# Patient Record
Sex: FEMALE | Race: WHITE | NOT HISPANIC OR LATINO | Employment: PART TIME | URBAN - METROPOLITAN AREA
[De-identification: names, ages, dates, MRNs, and addresses within clinical notes are randomized per-mention and may not be internally consistent; named-entity substitution may affect disease eponyms.]

---

## 2016-09-19 LAB — PAP-ABSTRACT: NORMAL

## 2017-01-04 ENCOUNTER — HOSPITAL ENCOUNTER (EMERGENCY)
Facility: CLINIC | Age: 27
Discharge: HOME | End: 2017-01-04
Attending: EMERGENCY MEDICINE | Admitting: EMERGENCY MEDICINE
Payer: COMMERCIAL

## 2017-01-04 VITALS
BODY MASS INDEX: 21.5 KG/M2 | WEIGHT: 125.31 LBS | SYSTOLIC BLOOD PRESSURE: 132 MMHG | DIASTOLIC BLOOD PRESSURE: 88 MMHG | RESPIRATION RATE: 16 BRPM | OXYGEN SATURATION: 100 % | TEMPERATURE: 97.9 F

## 2017-01-04 DIAGNOSIS — K08.89 PAIN, DENTAL: ICD-10-CM

## 2017-01-04 PROCEDURE — 99283 EMERGENCY DEPT VISIT LOW MDM: CPT

## 2017-01-04 PROCEDURE — 99283 EMERGENCY DEPT VISIT LOW MDM: CPT | Performed by: EMERGENCY MEDICINE

## 2017-01-04 RX ORDER — CLINDAMYCIN HCL 300 MG
300 CAPSULE ORAL 4 TIMES DAILY
Qty: 40 CAPSULE | Refills: 0 | Status: SHIPPED | OUTPATIENT
Start: 2017-01-04 | End: 2017-01-14

## 2017-01-04 ASSESSMENT — ENCOUNTER SYMPTOMS
FEVER: 0
HEADACHES: 0
LIGHT-HEADEDNESS: 0
ACTIVITY CHANGE: 0
SHORTNESS OF BREATH: 0
CHILLS: 0
WEAKNESS: 0
NECK PAIN: 0
TROUBLE SWALLOWING: 0
APPETITE CHANGE: 1
DIZZINESS: 0
FACIAL SWELLING: 0

## 2017-01-04 NOTE — ED NOTES
Pt ambulated to restroom, urine specimen provided CCL, held at bedside. MD at bedside for assessment.

## 2017-01-04 NOTE — ED PROVIDER NOTES
History     Chief Complaint   Patient presents with     Dental Pain     dental pain     HPI  Michelle Torres is a 26 year old female who presents with dental pain.  She is 4 weeks pregnant and states she has had dental pain worsening for the past 3 or 4 days.  Patient feels like her right upper wisdom tooth is coming in.  Pain is constant and her [place] become swollen.  She is concerned she might have an abscess.  She rates her pain a 7 out of 10 and worse with eating.  She denies any fevers has not any chills.  Denies any posterior pharynx pain.  She does not have a headache or neck pain.  She has been trying to get a hold of a dentist but is unable to at this time.    I have reviewed the Medications, Allergies, Past Medical and Surgical History, and Social History in the Epic system.    Review of Systems   Constitutional: Positive for appetite change. Negative for fever, chills and activity change.   HENT: Positive for dental problem. Negative for congestion, facial swelling and trouble swallowing.    Respiratory: Negative for shortness of breath.    Cardiovascular: Negative for chest pain.   Musculoskeletal: Negative for neck pain.   Skin: Negative for rash.   Neurological: Negative for dizziness, weakness, light-headedness and headaches.       Physical Exam   BP: 132/88 mmHg  Heart Rate: 101  Temp: 97.9  F (36.6  C)  Resp: 16  Weight: 56.841 kg (125 lb 5 oz)  SpO2: 100 %  Physical Exam   Constitutional: She appears well-developed and well-nourished. She appears distressed.   HENT:   Head: Normocephalic.   Mouth/Throat: Oropharynx is clear and moist.   R upper posterior molar region with mild to moderate gingival swelling and a  Eruption of a possible wisdom tooth posteriorly. Area is tender to palpation. Posterior pharynx was without erythema or edema. Present.    Eyes: Conjunctivae are normal.   Neck: Normal range of motion. Neck supple.   Mild r sided anterior cervical addenopathy.    Pulmonary/Chest: Effort  normal.   Musculoskeletal: Normal range of motion.   Neurological: She is alert. She exhibits normal muscle tone.   Skin: Skin is warm and dry. No rash noted.   Psychiatric: She has a normal mood and affect.   Nursing note and vitals reviewed.      ED Course   Procedures             Critical Care time:  none          Assessments & Plan (with Medical Decision Making) patient states she has found a dentist to go to a nail see her immediately.  I discussed possible dental block with patient but due to her having the dentist appointment she just wanted to go.  She does not want pain medication at this time.  She appears to have an incoming wisdom tooth there appears to be some gingival swelling and I did write a prescription for clindamycin by mouth.  She is to follow up with a dentist immediately after being discharged and return if symptoms worsen or new symptoms develop.       I have reviewed the nursing notes.    I have reviewed the findings, diagnosis, plan and need for follow up with the patient.    New Prescriptions    CLINDAMYCIN (CLEOCIN) 300 MG CAPSULE    Take 1 capsule (300 mg) by mouth 4 times daily for 10 days       Final diagnoses:   Pain, dental       1/4/2017   [facility] EMERGENCY DEPARTMENT      Aurelio Conway MD  01/04/17 1903

## 2017-01-04 NOTE — ED AVS SNAPSHOT
[facility]    [address] [address]    Phone:  [phone]    Fax:  [phone]                                       Michelle Torres   MRN: [id number]    Department:  [facility]   Date of Visit:  1/4/2017           Patient Information     Date Of Birth          1990        Your diagnoses for this visit were:     Pain, dental possible wisdom tooth eruption with gingivitis       You were seen by Aurelio Conway MD.      Follow-up Information     Follow up with Rochelle Carlisle DO.    Specialties:  Family Practice, Urgent Care    Why:  As needed    Contact information:    [address]   [facility] [address]  [phone]          Follow up with [facility].    Specialty:  EMERGENCY MEDICINE    Why:  If symptoms worsen    Contact information:    [address] [facility] [id number] [phone]    Additional information:    The medical center is located at   [address] ([address] in [place], [place] miles north   of [place]).        Discharge Instructions       Return if symptoms worsen or new symptoms develop.  Follow-up with dentist immediately after being discharged.  Take Tylenol for pain.  Take clindamycin as directed.  If worsening pain fevers chills or other symptoms present please return for further evaluation and care.    Discharge References/Attachments     DENTAL PAIN (ENGLISH)      24 Hour Appointment Hotline       To make an appointment at any [place] clinic, call [phone] ([phone]). If you don't have a family doctor or clinic, we will help you find one. [facility] clinics are conveniently located to serve the needs of you and your family.             Review of your medicines      START taking        Dose / Directions Last dose taken    clindamycin 300 MG capsule   Commonly known as:  CLEOCIN   Dose:  300 mg   Quantity:   40 capsule        Take 1 capsule (300 mg) by mouth 4 times daily for 10 days   Refills:  0          Our records show that you are taking the medicines listed below. If these are incorrect, please call your family doctor or clinic.        Dose / Directions Last dose taken    fluconazole 150 MG tablet   Commonly known as:  DIFLUCAN   Dose:  150 mg   Quantity:  2 tablet        Take 1 tablet (150 mg) by mouth once , May repeat in 5-7  Days for unresolved symptoms.   Refills:  0                Prescriptions were sent or printed at these locations (1 Prescription)                   Other Prescriptions                Printed at Department/Unit printer (1 of 1)         clindamycin (CLEOCIN) 300 MG capsule                Orders Needing Specimen Collection     None      Pending Results     No orders found from 1/3/2017 to 1/5/2017.            Pending Culture Results     No orders found from 1/3/2017 to 1/5/2017.             Test Results from your hospital stay            Thank you for choosing [place]       Thank you for choosing [facility] for your care. Our goal is always to provide you with excellent care. Hearing back from our patients is one way we can continue to improve our services. Please take a few minutes to complete the written survey that you may receive in the mail after you visit with us. Thank you!        [facility] Information     [employer] gives you secure access to your electronic health record. If you see a primary care provider, you can also send messages to your care team and make appointments. If you have questions, please call your primary care clinic.  If you do not have a primary care provider, please call [phone] and they will assist you.        Care EveryWhere ID     This is your Care EveryWhere ID. This could be used by other organizations to access your [place] medical records  [id number]        After Visit Summary       This is your record. Keep this with you and show to your community  pharmacist(s) and doctor(s) at your next visit.

## 2017-01-04 NOTE — ED AVS SNAPSHOT
[facility]    [address] [address]    Phone:  [phone]    Fax:  [phone]                                       Michelle Torres   MRN: [id number]    Department:  [facility]   Date of Visit:  1/4/2017           After Visit Summary Signature Page     I have received my discharge instructions, and my questions have been answered. I have discussed any challenges I see with this plan with the nurse or doctor.    ..........................................................................................................................................  Patient/Patient Representative Signature      ..........................................................................................................................................  Patient Representative Print Name and Relationship to Patient    ..................................................               ................................................  Date                                            Time    ..........................................................................................................................................  Reviewed by Signature/Title    ...................................................              ..............................................  Date                                                            Time

## 2017-01-04 NOTE — DISCHARGE INSTRUCTIONS
Return if symptoms worsen or new symptoms develop.  Follow-up with dentist immediately after being discharged.  Take Tylenol for pain.  Take clindamycin as directed.  If worsening pain fevers chills or other symptoms present please return for further evaluation and care.

## 2017-01-13 LAB
ABO + RH BLD: NORMAL
ABO + RH BLD: NORMAL
BLD GP AB SCN SERPL QL: NEGATIVE
HBV SURFACE AG SERPL QL IA: NON REACTIVE
RUBELLA ANTIBODY IGG QUANTITATIVE: 0.93 IU/ML
T PALLIDUM IGG SER QL: NEGATIVE

## 2017-01-25 LAB
HBA1C MFR BLD: 5.5 % (ref 0–5.7)
HIV 1+2 AB+HIV1 P24 AG SERPL QL IA: NON REACTIVE
VZV IGG SER QL IA: 370.9 INDEX

## 2017-03-29 LAB
ALT SERPL-CCNC: 7 IU/L (ref 8–45)
AST SERPL-CCNC: 10 IU/L (ref 2–40)

## 2017-04-19 ENCOUNTER — TRANSFERRED RECORDS (OUTPATIENT)
Dept: HEALTH INFORMATION MANAGEMENT | Facility: CLINIC | Age: 27
End: 2017-04-19

## 2017-04-19 LAB
C TRACH DNA SPEC QL PROBE+SIG AMP: NEGATIVE
CREAT SERPL-MCNC: 0.57 MG/DL (ref 0.57–1.11)
ERYTHROCYTE [DISTWIDTH] IN BLOOD BY AUTOMATED COUNT: 14.9 %
GFR SERPL CREATININE-BSD FRML MDRD: >60 ML/MIN/1.73M2
GLUCOSE SERPL-MCNC: 90 MG/DL (ref 65–100)
HCT VFR BLD AUTO: 30.8 %
HEMOGLOBIN: 10.2 G/DL (ref 11.7–15.7)
MCH RBC QN AUTO: 26.8 PG
MCHC RBC AUTO-ENTMCNC: 33.1 G/DL
MCV RBC AUTO: 81 FL
N GONORRHOEA DNA SPEC QL PROBE+SIG AMP: NEGATIVE
PLATELET # BLD AUTO: 406 10^9/L
POTASSIUM SERPL-SCNC: 3.5 MMOL/L (ref 3.5–5)
RBC # BLD AUTO: 3.81 10^12/L
SPECIMEN DESCRIP: NORMAL
SPECIMEN DESCRIPTION: NORMAL
WBC # BLD AUTO: 11.2 10^9/L

## 2017-05-31 ENCOUNTER — TRANSFERRED RECORDS (OUTPATIENT)
Dept: HEALTH INFORMATION MANAGEMENT | Facility: CLINIC | Age: 27
End: 2017-05-31

## 2017-06-12 ENCOUNTER — PRENATAL OFFICE VISIT (OUTPATIENT)
Dept: FAMILY MEDICINE | Facility: CLINIC | Age: 27
End: 2017-06-12
Payer: COMMERCIAL

## 2017-06-12 VITALS
DIASTOLIC BLOOD PRESSURE: 56 MMHG | HEART RATE: 96 BPM | WEIGHT: 125.6 LBS | SYSTOLIC BLOOD PRESSURE: 122 MMHG | BODY MASS INDEX: 21.44 KG/M2 | HEIGHT: 64 IN | TEMPERATURE: 97.3 F

## 2017-06-12 DIAGNOSIS — Z34.93 PRENATAL CARE IN THIRD TRIMESTER: Primary | ICD-10-CM

## 2017-06-12 DIAGNOSIS — M54.50 ACUTE BILATERAL LOW BACK PAIN WITHOUT SCIATICA: ICD-10-CM

## 2017-06-12 DIAGNOSIS — F32.4 MAJOR DEPRESSIVE DISORDER WITH SINGLE EPISODE, IN PARTIAL REMISSION (H): ICD-10-CM

## 2017-06-12 PROCEDURE — 99207 ZZC PRENATAL VISIT: CPT | Performed by: FAMILY MEDICINE

## 2017-06-12 RX ORDER — SERTRALINE HYDROCHLORIDE 100 MG/1
100 TABLET, FILM COATED ORAL DAILY
Status: ON HOLD | COMMUNITY
Start: 2017-05-26 | End: 2017-07-27

## 2017-06-12 RX ORDER — PRENATAL VIT/IRON FUM/FOLIC AC 27MG-0.8MG
1 TABLET ORAL DAILY
COMMUNITY
Start: 2017-01-04 | End: 2018-04-09

## 2017-06-12 NOTE — PROGRESS NOTES
26w6d    Pt presents today as a transfer of care.  Recently moved and now planning on delivering at [facility].  Previous care through [facility].  Records available in CareEverywhere.  Pt reports h/o frequent UTI with her first pregnancy and she did have a pyelonephritis at that time as well.  No issues so far with this pregnancy.    Fetal movement noted.  Feels like she is having a lot of pain in the low back and lower abdomen, has a sharp pain.  This has bene going on most of her pregnancy and is not really changed.  Has rare [place] contractions, maybe twice per week.  No change in vaginal discharge or vaginal bleeding noted.     Taking 2 [place] vitamins daily, does not tolerate prenatals.    Continuing to smoke.  Used to be 2ppd, down to 1/2 ppd.  Denies EtOH or drug use.  Declines 1 hour GTT today, would prefer to return.  Anomaly screen on 5/31/17, results reviewed in Care Everywhere and normal.      Mood has been okay on the sertraline.  Had a recent dose increase about 3 weeks ago.  Has had a lot of stressors, willing to visit with a counselor.    Plan:    Patient Instructions   Please schedule your gestational diabetes screen as soon as possible.  You will need to be fasting for 2 hours prior to your visit and will need to plan to be in clinic for 1 hour.  This can be scheduled as a lab only appointment.    Please schedule a visit with OB in 2 weeks for your next routine prenatal visit.    I would recommend a visit with physical therapy for the ongoing low back pain.    I also placed a new referral for counseling.  You should be getting a call in 1-2 days to help you schedule.    We should plan to visit again here in about 4 weeks.    Reviewed with pt that I no longer do deliveries but co-manage with OB.  Reviewed that she would need to see them now and plan transfer of care at 36 weeks.  She verbalized understanding.

## 2017-06-12 NOTE — PATIENT INSTRUCTIONS
Please schedule your gestational diabetes screen as soon as possible.  You will need to be fasting for 2 hours prior to your visit and will need to plan to be in clinic for 1 hour.  This can be scheduled as a lab only appointment.    Please schedule a visit with OB in 2 weeks for your next routine prenatal visit.    I would recommend a visit with physical therapy for the ongoing low back pain.    I also placed a new referral for counseling.  You should be getting a call in 1-2 days to help you schedule.    We should plan to visit again here in about 4 weeks.

## 2017-06-12 NOTE — MR AVS SNAPSHOT
After Visit Summary   6/12/2017    Michelle Torres    MRN: [id number]           Patient Information     Date Of Birth          1990        Visit Information        Provider Department      6/12/2017 9:20 AM Rochelle Carlisle DO [facility]        Today's Diagnoses     Prenatal care in third trimester    -  1    Major depressive disorder with single episode, in partial remission (H)        Acute bilateral low back pain without sciatica          Care Instructions    Please schedule your gestational diabetes screen as soon as possible.  You will need to be fasting for 2 hours prior to your visit and will need to plan to be in clinic for 1 hour.  This can be scheduled as a lab only appointment.    Please schedule a visit with OB in 2 weeks for your next routine prenatal visit.    I would recommend a visit with physical therapy for the ongoing low back pain.    I also placed a new referral for counseling.  You should be getting a call in 1-2 days to help you schedule.    We should plan to visit again here in about 4 weeks.          Follow-ups after your visit        Additional Services     [facility] PT, HAND, AND CHIROPRACTIC REFERRAL       **This order will print in the [facility] Scheduling Office**    Physical Therapy, Hand Therapy and Chiropractic Care are available through:    *[facility]  *[facility]  *[facility]    Call one number to schedule at any of the above locations: [phone].    Your provider has referred you to: Physical Therapy at [facility] or [facility]    Indication/Reason for Referral: Low Back Pain  Onset of Illness: months, related to pregnancy  Therapy Orders: Evaluate and Treat  Special Programs: None  Special Request: None    Halima Varghese      Additional Comments for the Therapist or Chiropractor:     Please be aware that coverage of these services is subject to the terms and limitations of your health insurance plan.  Call  member services at your health plan with any benefit or coverage questions.      Please bring the following to your appointment:    *Your personal calendar for scheduling future appointments  *Comfortable clothing            MENTAL HEALTH REFERRAL       Your provider has referred you to: [place]: [facility] - Counseling (Individual/Couples/Family) - [facility] [phone]   [email or web address]   *Patient will be contacted by [facility] scheduling partner, [facility]), to schedule an appointment.  Patients may also call [facility] to schedule.    All scheduling is subject to the client's specific insurance plan & benefits, provider/location availability, and provider clinical specialities.  Please arrive 15 minutes early for your first appointment and bring your completed paperwork.    Please be aware that coverage of these services is subject to the terms and limitations of your health insurance plan.  Call member services at your health plan with any benefit or coverage questions.            OB/GYN REFERRAL       Your provider has referred you to:  [place]: [facility] [phone]   [email or web address]    Please be aware that coverage of these services is subject to the terms and limitations of your health insurance plan.  Call member services at your health plan with any benefit or coverage questions.      Please bring the following with you to your appointment:    (1) Any X-Rays, CTs or MRIs which have been performed.  Contact the facility where they were done to arrange for  prior to your scheduled appointment.   (2) List of current medications   (3) This referral request   (4) Any documents/labs given to you for this referral                  Future tests that were ordered for you today     Open Future Orders        Priority Expected Expires Ordered     "Glucose tolerance gest screen 1 hour Routine 6/13/2017 7/12/2017 6/12/2017    Hemoglobin Routine 6/13/2017 7/12/2017 6/12/2017    Anti Treponema Routine 6/13/2017 7/12/2017 6/12/2017            Who to contact     Normal or non-critical lab and imaging results will be communicated to you by [employer], letter or phone within 4 business days after the clinic has received the results. If you do not hear from us within 7 days, please contact the clinic through [employer] or phone. If you have a critical or abnormal lab result, we will notify you by phone as soon as possible.  Submit refill requests through [employer] or call your pharmacy and they will forward the refill request to us. Please allow 3 business days for your refill to be completed.          If you need to speak with a  for additional information , please call: [phone]           Additional Information About Your Visit        [employer] Information     [employer] gives you secure access to your electronic health record. If you see a primary care provider, you can also send messages to your care team and make appointments. If you have questions, please call your primary care clinic.  If you do not have a primary care provider, please call [phone] and they will assist you.        Care EveryWhere ID     This is your Care EveryWhere ID. This could be used by other organizations to access your [facility] medical records  [id number]        Your Vitals Were     Pulse Temperature Height Last Period Breastfeeding? BMI (Body Mass Index)    96 97.3  F (36.3  C) (Tympanic) 5' 3.5\" (1.613 m) 09/03/2016 (LMP Unknown) No 21.9 kg/m2       Blood Pressure from Last 3 Encounters:   06/12/17 122/56   01/04/17 132/88   09/22/16 110/76    Weight from Last 3 Encounters:   06/12/17 125 lb 9.6 oz (57 kg)   01/04/17 125 lb 5 oz (56.8 kg)   09/22/16 124 lb 12.8 oz (56.6 kg)              We Performed the Following     [facility] PT, HAND, AND CHIROPRACTIC REFERRAL     MENTAL " HEALTH REFERRAL     OB/GYN REFERRAL          Today's Medication Changes          These changes are accurate as of: 6/12/17 10:24 AM.  If you have any questions, ask your nurse or doctor.               These medicines have changed or have updated prescriptions.        Dose/Directions    prenatal multivitamin  plus iron 27-0.8 MG Tabs per tablet   This may have changed:  Another medication with the same name was removed. Continue taking this medication, and follow the directions you see here.   Changed by:  Rochelle Carlisle DO        Dose:  1 tablet   Take 1 tablet by mouth daily   Refills:  0                Primary Care Provider Office Phone # Fax #    Rochelle Carlisle [phone]       [address]   [place] [facility] [address]        Thank you!     Thank you for choosing [facility]  for your care. Our goal is always to provide you with excellent care. Hearing back from our patients is one way we can continue to improve our services. Please take a few minutes to complete the written survey that you may receive in the mail after your visit with us. Thank you!             Your Updated Medication List - Protect others around you: Learn how to safely use, store and throw away your medicines at www.disposemymeds.org.          This list is accurate as of: 6/12/17 10:24 AM.  Always use your most recent med list.                   Brand Name Dispense Instructions for use    prenatal multivitamin  plus iron 27-0.8 MG Tabs per tablet      Take 1 tablet by mouth daily       sertraline 100 MG tablet    ZOLOFT     Take 100 mg by mouth daily

## 2017-06-13 ASSESSMENT — PATIENT HEALTH QUESTIONNAIRE - PHQ9: SUM OF ALL RESPONSES TO PHQ QUESTIONS 1-9: 7

## 2017-06-20 PROBLEM — M54.50 ACUTE BILATERAL LOW BACK PAIN WITHOUT SCIATICA: Status: ACTIVE | Noted: 2017-06-20

## 2017-06-20 PROBLEM — F32.4 MAJOR DEPRESSIVE DISORDER WITH SINGLE EPISODE, IN PARTIAL REMISSION (H): Status: ACTIVE | Noted: 2017-06-20

## 2017-06-20 PROBLEM — Z34.93 PRENATAL CARE IN THIRD TRIMESTER: Status: ACTIVE | Noted: 2017-06-20

## 2017-07-06 ENCOUNTER — HOSPITAL ENCOUNTER (OUTPATIENT)
Facility: CLINIC | Age: 27
Discharge: HOME | End: 2017-07-06
Attending: OBSTETRICS & GYNECOLOGY | Admitting: OBSTETRICS & GYNECOLOGY
Payer: COMMERCIAL

## 2017-07-06 ENCOUNTER — TELEPHONE (OUTPATIENT)
Dept: FAMILY MEDICINE | Facility: CLINIC | Age: 27
End: 2017-07-06

## 2017-07-06 PROBLEM — Z34.80 PRENATAL CARE, SUBSEQUENT PREGNANCY: Status: ACTIVE | Noted: 2017-07-06

## 2017-07-06 LAB
A1 MICROGLOB PLACENTAL VAG QL: NEGATIVE
ALBUMIN UR-MCNC: NEGATIVE MG/DL
AMORPH CRY #/AREA URNS HPF: ABNORMAL /HPF
APPEARANCE UR: ABNORMAL
BILIRUB UR QL STRIP: NEGATIVE
COLOR UR AUTO: YELLOW
GLUCOSE UR STRIP-MCNC: NEGATIVE MG/DL
HGB UR QL STRIP: NEGATIVE
KETONES UR STRIP-MCNC: NEGATIVE MG/DL
LEUKOCYTE ESTERASE UR QL STRIP: ABNORMAL
MUCOUS THREADS #/AREA URNS LPF: PRESENT /LPF
NITRATE UR QL: NEGATIVE
PH UR STRIP: 7 PH (ref 5–7)
RBC #/AREA URNS AUTO: 2 /HPF (ref 0–2)
SP GR UR STRIP: 1.01 (ref 1–1.03)
SQUAMOUS #/AREA URNS AUTO: 16 /HPF (ref 0–1)
URN SPEC COLLECT METH UR: ABNORMAL
UROBILINOGEN UR STRIP-MCNC: NORMAL MG/DL (ref 0–2)
WBC #/AREA URNS AUTO: 17 /HPF (ref 0–2)

## 2017-07-06 PROCEDURE — 99214 OFFICE O/P EST MOD 30 MIN: CPT

## 2017-07-06 PROCEDURE — 87086 URINE CULTURE/COLONY COUNT: CPT | Performed by: OBSTETRICS & GYNECOLOGY

## 2017-07-06 PROCEDURE — 84112 EVAL AMNIOTIC FLUID PROTEIN: CPT | Performed by: OBSTETRICS & GYNECOLOGY

## 2017-07-06 PROCEDURE — 81001 URINALYSIS AUTO W/SCOPE: CPT | Performed by: OBSTETRICS & GYNECOLOGY

## 2017-07-06 RX ORDER — ONDANSETRON 2 MG/ML
4 INJECTION INTRAMUSCULAR; INTRAVENOUS EVERY 6 HOURS PRN
Status: DISCONTINUED | OUTPATIENT
Start: 2017-07-06 | End: 2017-07-06 | Stop reason: HOSPADM

## 2017-07-06 NOTE — TELEPHONE ENCOUNTER
4:40PM  Pt called for appt, was warm passed to me, she states she is 30 weeks pg with second child  Has 3 yo at home, last night she noted a clear mucousy  Leakage,wetting panties , no odor,which has continue into today ,denies temp,  denies UTI sx ,  Feels  exhausted ,has HA, increased  lower  back pain  and note decreased FM today    Pt states she has had more erwin gordon contx  Past few days .  States she feels she is well hydrated,   Advised this late in day needs to go up to birthing center at [facility] to be seen and evaluated , states her boyfriend could come and take her   Chart to Dr Orestes Guzman  Clinic  RN/Mp Ortiz

## 2017-07-07 VITALS
HEART RATE: 97 BPM | DIASTOLIC BLOOD PRESSURE: 65 MMHG | TEMPERATURE: 97.9 F | SYSTOLIC BLOOD PRESSURE: 114 MMHG | RESPIRATION RATE: 18 BRPM

## 2017-07-08 LAB
BACTERIA SPEC CULT: ABNORMAL
MICRO REPORT STATUS: ABNORMAL
SPECIMEN SOURCE: ABNORMAL

## 2017-07-17 ENCOUNTER — TELEPHONE (OUTPATIENT)
Dept: FAMILY MEDICINE | Facility: CLINIC | Age: 27
End: 2017-07-17

## 2017-07-17 DIAGNOSIS — B37.31 CANDIDIASIS OF VAGINA: Primary | ICD-10-CM

## 2017-07-17 RX ORDER — FLUCONAZOLE 150 MG/1
150 TABLET ORAL ONCE
Qty: 1 TABLET | Refills: 0 | Status: SHIPPED | OUTPATIENT
Start: 2017-07-17 | End: 2017-07-17

## 2017-07-17 NOTE — TELEPHONE ENCOUNTER
Reason for call:  Patient reporting a symptom    Symptom or request: just finished Macrobid for a UTI, now has a yeast infection, would like to get some Diflucan.    Duration (how long have symptoms been present): 2 days    Have you been treated for this before? Yes    Additional comments: patient uses the [facility]    Phone Number patient can be reached at:  Home number on file [phone] (home)    Best Time:  any    Can we leave a detailed message on this number:  YES   Angelia Stewart  [facility] [place] [facility]      Call taken on 7/17/2017 at 9:58 AM by Angelia Stewart

## 2017-07-17 NOTE — TELEPHONE ENCOUNTER
Patient is still taking macrobid for a UTI. Symptoms of a yeast infection started 2 days ago. She has a discharge with an odor, itching and burning. She has had diflucan before for yeast infections. Patient is also 32 weeks pregnant. Please advise in Dr. Carlisle's absence. Thank you.  Luna Jordan RN

## 2017-07-17 NOTE — TELEPHONE ENCOUNTER
Prescription  [facility] to [facility] for diflucan 150 mg orally x 1.     Please call or email with any additional questions or concerns  RUPINDER Pagan CNP

## 2017-07-17 NOTE — TELEPHONE ENCOUNTER
Pt called back and would like Luna to call her.  She updated her phone number to [phone].  Pt has had many yeast infections and is hoping she doesn't have to be seen as she is 32 wks pregnant.    Thank you,  Karissa Jiménez, Station

## 2017-07-27 ENCOUNTER — HOSPITAL ENCOUNTER (OUTPATIENT)
Facility: CLINIC | Age: 27
Discharge: HOME | End: 2017-07-27
Attending: OBSTETRICS & GYNECOLOGY | Admitting: OBSTETRICS & GYNECOLOGY
Payer: COMMERCIAL

## 2017-07-27 VITALS — TEMPERATURE: 98.7 F | SYSTOLIC BLOOD PRESSURE: 121 MMHG | DIASTOLIC BLOOD PRESSURE: 71 MMHG

## 2017-07-27 DIAGNOSIS — Z34.93 PRENATAL CARE IN THIRD TRIMESTER: Primary | ICD-10-CM

## 2017-07-27 LAB
ALBUMIN UR-MCNC: 10 MG/DL
APPEARANCE UR: ABNORMAL
BILIRUB UR QL STRIP: NEGATIVE
COLOR UR AUTO: YELLOW
GLUCOSE UR STRIP-MCNC: NEGATIVE MG/DL
HGB UR QL STRIP: NEGATIVE
KETONES UR STRIP-MCNC: NEGATIVE MG/DL
LEUKOCYTE ESTERASE UR QL STRIP: ABNORMAL
MUCOUS THREADS #/AREA URNS LPF: PRESENT /LPF
NITRATE UR QL: NEGATIVE
PH UR STRIP: 7.5 PH (ref 5–7)
RBC #/AREA URNS AUTO: 0 /HPF (ref 0–2)
SP GR UR STRIP: 1.02 (ref 1–1.03)
SQUAMOUS #/AREA URNS AUTO: 13 /HPF (ref 0–1)
URN SPEC COLLECT METH UR: ABNORMAL
UROBILINOGEN UR STRIP-MCNC: NORMAL MG/DL (ref 0–2)
WBC #/AREA URNS AUTO: 21 /HPF (ref 0–2)

## 2017-07-27 PROCEDURE — 87086 URINE CULTURE/COLONY COUNT: CPT | Performed by: OBSTETRICS & GYNECOLOGY

## 2017-07-27 PROCEDURE — 59025 FETAL NON-STRESS TEST: CPT

## 2017-07-27 PROCEDURE — 81001 URINALYSIS AUTO W/SCOPE: CPT | Performed by: OBSTETRICS & GYNECOLOGY

## 2017-07-27 PROCEDURE — 59025 FETAL NON-STRESS TEST: CPT | Mod: 26 | Performed by: OBSTETRICS & GYNECOLOGY

## 2017-07-27 RX ORDER — CALCIUM CARBONATE 500 MG/1
1 TABLET, CHEWABLE ORAL 3 TIMES DAILY PRN
COMMUNITY
End: 2018-04-09

## 2017-07-27 RX ORDER — ONDANSETRON 2 MG/ML
4 INJECTION INTRAMUSCULAR; INTRAVENOUS EVERY 6 HOURS PRN
Status: DISCONTINUED | OUTPATIENT
Start: 2017-07-27 | End: 2017-07-27 | Stop reason: HOSPADM

## 2017-07-27 RX ORDER — NITROFURANTOIN 25; 75 MG/1; MG/1
100 CAPSULE ORAL 2 TIMES DAILY
Qty: 14 CAPSULE | Refills: 0 | Status: SHIPPED | OUTPATIENT
Start: 2017-07-27 | End: 2017-08-03

## 2017-07-27 RX ORDER — FLUCONAZOLE 150 MG/1
150 TABLET ORAL ONCE
Qty: 1 TABLET | Refills: 0 | Status: SHIPPED | OUTPATIENT
Start: 2017-07-27 | End: 2017-07-27

## 2017-07-27 NOTE — PROGRESS NOTES
Patient arrived for assessment due to  contractions that began last night.  Patient was eventually able to sleep .  She called [facility] about 1045 stating that this morning she was experiencing uncomfortable tightenings about 4-5/hour.  Denied leaking of fluid or vaginal bleeding.  Fetus active.  Patient was advised to hydrate and time tightenings for an hour and call back.  Patient advised to come to hospital as this was not helpful.  Approximately 1 month ago, patient was treated for a bladder infection and completed her course of antibiotics.  Denies symptoms now but urine was sent to lab.  Placed on fetal monitor.

## 2017-07-27 NOTE — IP AVS SNAPSHOT
MRN:[id number]                      After Visit Summary   7/27/2017    Michelle Torres    MRN: [id number]           Thank you!     Thank you for choosing [facility] for your care. Our goal is always to provide you with excellent care. Hearing back from our patients is one way we can continue to improve our services. Please take a few minutes to complete the written survey that you may receive in the mail after you visit with us. Thank you!        Patient Information     Date Of Birth          1990        About your hospital stay     You were admitted on:  July 27, 2017 You last received care in the:  [facility]    You were discharged on:  July 27, 2017       Who to Call     For medical emergencies, please call 911.  For non-urgent questions about your medical care, please call your primary care provider or clinic, [phone]          Attending Provider     Provider Specialty    Clare Murrell MD OB/Gyn       Primary Care Provider Office Phone # Fax #    Rochelle Garcia DO [place] [phone]      Further instructions from your care team       Make an appointment to establish care with an OB provider this week.  Take medications as prescribed.  Maintain adequate hydration.  Call [facility] if you have questions or concerns.    Pending Results     Date and Time Order Name Status Description    7/27/2017 1310 Urine Culture Aerobic Bacterial In process             Admission Information     Date & Time Provider Department Dept. Phone    7/27/2017 Clare Murrell MD [facility] [phone]      Your Vitals Were     Last Period                   09/03/2016 (LMP Unknown)           MyChart Information     [employer] gives you secure access to your electronic health record. If you see a primary care provider, you can also send messages to your care team and make appointments. If you have questions, please call your primary care clinic.  If you do  not have a primary care provider, please call [phone] and they will assist you.        Care EveryWhere ID     This is your Care EveryWhere ID. This could be used by other organizations to access your [place] medical records  [id number]        Equal Access to Services     YULIANA BUSBY : Hadii aad ku hadjose antoniomerrill Soben, waaxda luqadaha, qaybta kaalmada adeegyada, otis miladys pratikscotty phanchelita palmer lori schultz. So [facility] [phone].    ATENCIÓN: Si habla español, tiene a crowder disposición servicios gratuitos de asistencia lingüística. Llame al [phone].    We comply with applicable federal civil rights laws and [place] laws. We do not discriminate on the basis of race, color, national origin, age, disability sex, sexual orientation or gender identity.               Review of your medicines      UNREVIEWED medicines. Ask your doctor about these medicines        Dose / Directions    calcium carbonate 500 MG chewable tablet   Commonly known as:  TUMS        Dose:  1 chew tab   Take 1 chew tab by mouth 3 times daily as needed for heartburn   Refills:  0       prenatal multivitamin  plus iron 27-0.8 MG Tabs per tablet        Dose:  1 tablet   Take 1 tablet by mouth daily   Refills:  0         START taking        Dose / Directions    fluconazole 150 MG tablet   Commonly known as:  DIFLUCAN   Used for:  Prenatal care in third trimester        Dose:  150 mg   Take 1 tablet (150 mg) by mouth once for 1 dose   Quantity:  1 tablet   Refills:  0       nitroFURantoin (macrocrystal-monohydrate) 100 MG capsule   Commonly known as:  MACROBID   Used for:  Prenatal care in third trimester        Dose:  100 mg   Take 1 capsule (100 mg) by mouth 2 times daily   Quantity:  14 capsule   Refills:  0            Where to get your medicines      These medications were sent to [id number] [place] [id number] AT [place] [address] [place] [address]     Phone:  [phone]      fluconazole 150 MG tablet    nitroFURantoin (macrocrystal-monohydrate) 100 MG capsule                Protect others around you: Learn how to safely use, store and throw away your medicines at www.disposemymeds.org.             Medication List: This is a list of all your medications and when to take them. Check marks below indicate your daily home schedule. Keep this list as a reference.      Medications           Morning Afternoon Evening Bedtime As Needed    calcium carbonate 500 MG chewable tablet   Commonly known as:  TUMS   Take 1 chew tab by mouth 3 times daily as needed for heartburn                                fluconazole 150 MG tablet   Commonly known as:  DIFLUCAN   Take 1 tablet (150 mg) by mouth once for 1 dose                                nitroFURantoin (macrocrystal-monohydrate) 100 MG capsule   Commonly known as:  MACROBID   Take 1 capsule (100 mg) by mouth 2 times daily                                prenatal multivitamin  plus iron 27-0.8 MG Tabs per tablet   Take 1 tablet by mouth daily

## 2017-07-27 NOTE — DISCHARGE INSTRUCTIONS
Make an appointment to establish care with an OB provider this week.  Take medications as prescribed.  Maintain adequate hydration.  Call [facility] if you have questions or concerns.

## 2017-07-27 NOTE — PROGRESS NOTES
Patient was discharged.  The discomfort she was experiencing at was was less intense her.  It was mostly in left lateral abdomen and back.  Patient has felt only one contraction here which was noted on monitor.  Discharge medications were discussed.  Patient left ambulatory.

## 2017-07-27 NOTE — IP AVS SNAPSHOT
[facility]    [address] [address]    Phone:  [phone]    Fax:  [phone]                                       After Visit Summary   7/27/2017    Michelle Torres    MRN: [id number]           After Visit Summary Signature Page     I have received my discharge instructions, and my questions have been answered. I have discussed any challenges I see with this plan with the nurse or doctor.    ..........................................................................................................................................  Patient/Patient Representative Signature      ..........................................................................................................................................  Patient Representative Print Name and Relationship to Patient    ..................................................               ................................................  Date                                            Time    ..........................................................................................................................................  Reviewed by Signature/Title    ...................................................              ..............................................  Date                                                            Time

## 2017-07-27 NOTE — PROGRESS NOTES
Updated Dr. Murrell with patient's concerns and results of urinalysis.  Plan to culture urine and treat with Macrobid.  Due to absence of documented or palpated contractions, MD does not recommend cervical exam.

## 2017-07-29 LAB
BACTERIA SPEC CULT: ABNORMAL
MICRO REPORT STATUS: ABNORMAL
SPECIMEN SOURCE: ABNORMAL

## 2017-07-31 NOTE — PROGRESS NOTES
Inform patient that her urine culture does not show a bacteria that would cause a UTI. She can discontinue her antibiotic course since there is nothing that it is treating.     Clare Murrell MD  [facility]

## 2017-08-01 ENCOUNTER — TELEPHONE (OUTPATIENT)
Dept: OBGYN | Facility: CLINIC | Age: 27
End: 2017-08-01

## 2017-08-01 DIAGNOSIS — Z34.80 PRENATAL CARE, SUBSEQUENT PREGNANCY, UNSPECIFIED TRIMESTER: Primary | ICD-10-CM

## 2017-08-02 PROBLEM — D50.9 IRON DEFICIENCY ANEMIA: Status: ACTIVE | Noted: 2017-08-02

## 2017-08-03 ENCOUNTER — PRENATAL OFFICE VISIT (OUTPATIENT)
Dept: OBGYN | Facility: CLINIC | Age: 27
End: 2017-08-03
Payer: COMMERCIAL

## 2017-08-03 VITALS
HEIGHT: 64 IN | DIASTOLIC BLOOD PRESSURE: 80 MMHG | HEART RATE: 109 BPM | BODY MASS INDEX: 23.39 KG/M2 | WEIGHT: 137 LBS | SYSTOLIC BLOOD PRESSURE: 118 MMHG

## 2017-08-03 DIAGNOSIS — Z34.93 PRENATAL CARE IN THIRD TRIMESTER: Primary | ICD-10-CM

## 2017-08-03 DIAGNOSIS — M54.9 PREGNANCY RELATED BACK PAIN IN THIRD TRIMESTER, ANTEPARTUM: ICD-10-CM

## 2017-08-03 DIAGNOSIS — D50.9 IRON DEFICIENCY ANEMIA, UNSPECIFIED IRON DEFICIENCY ANEMIA TYPE: ICD-10-CM

## 2017-08-03 DIAGNOSIS — O26.893 PREGNANCY RELATED BACK PAIN IN THIRD TRIMESTER, ANTEPARTUM: ICD-10-CM

## 2017-08-03 PROCEDURE — 99207 ZZC PRENATAL VISIT: CPT | Performed by: OBSTETRICS & GYNECOLOGY

## 2017-08-03 RX ORDER — HYDROXYZINE HYDROCHLORIDE 25 MG/1
50-100 TABLET, FILM COATED ORAL EVERY 6 HOURS PRN
Qty: 60 TABLET | Refills: 1 | Status: ON HOLD | OUTPATIENT
Start: 2017-08-03 | End: 2017-08-27

## 2017-08-03 NOTE — NURSING NOTE
"Initial /80 (BP Location: Right arm, Patient Position: Chair, Cuff Size: Adult Regular)  Pulse 109  Ht 5' 4\" (1.626 m)  Wt 137 lb (62.1 kg)  LMP 09/03/2016 (LMP Unknown)  Breastfeeding? No  BMI 23.52 kg/m2 Estimated body mass index is 23.52 kg/(m^2) as calculated from the following:    Height as of this encounter: 5' 4\" (1.626 m).    Weight as of this encounter: 137 lb (62.1 kg). .    Deysi Calhoun    "

## 2017-08-03 NOTE — MR AVS SNAPSHOT
"              After Visit Summary   8/3/2017    Michelle Torres    MRN: [id number]           Patient Information     Date Of Birth          1990        Visit Information        Provider Department      8/3/2017 11:30 AM Clare Murrell MD [facility]        Today's Diagnoses     Prenatal care in third trimester    -  1    Pregnancy related back pain in third trimester, antepartum        Iron deficiency anemia, unspecified iron deficiency anemia type           Follow-ups after your visit        Who to contact     If you have questions or need follow up information about today's clinic visit or your schedule please contact [facility] directly at [phone].  Normal or non-critical lab and imaging results will be communicated to you by MyChart, letter or phone within 4 business days after the clinic has received the results. If you do not hear from us within 7 days, please contact the clinic through [employer] or phone. If you have a critical or abnormal lab result, we will notify you by phone as soon as possible.  Submit refill requests through [employer] or call your pharmacy and they will forward the refill request to us. Please allow 3 business days for your refill to be completed.          Additional Information About Your Visit        MyChart Information     [employer] gives you secure access to your electronic health record. If you see a primary care provider, you can also send messages to your care team and make appointments. If you have questions, please call your primary care clinic.  If you do not have a primary care provider, please call [phone] and they will assist you.        Care EveryWhere ID     This is your Care EveryWhere ID. This could be used by other organizations to access your [place] medical records  [id number]        Your Vitals Were     Pulse Height Last Period Breastfeeding? BMI (Body Mass Index)       109 5' 4\" (1.626 m) 09/03/2016 (LMP Unknown) No " 23.52 kg/m2        Blood Pressure from Last 3 Encounters:   08/03/17 118/80   07/27/17 121/71   07/06/17 114/65    Weight from Last 3 Encounters:   08/03/17 137 lb (62.1 kg)   06/12/17 125 lb 9.6 oz (57 kg)   01/04/17 125 lb 5 oz (56.8 kg)              Today, you had the following     No orders found for display         Today's Medication Changes          These changes are accurate as of: 8/3/17 12:50 PM.  If you have any questions, ask your nurse or doctor.               Start taking these medicines.        Dose/Directions    hydrOXYzine 25 MG tablet   Commonly known as:  ATARAX   Used for:  Pregnancy related back pain in third trimester, antepartum        Dose:   mg   Take 2-4 tablets ( mg) by mouth every 6 hours as needed for itching   Quantity:  60 tablet   Refills:  1            Where to get your medicines      These medications were sent to [facility] [address] [place] [address]  AT [address] , [facility] [address]     Phone:  [phone]     hydrOXYzine 25 MG tablet                Primary Care Provider Office Phone # Fax #    Rochelle Smithjabari Carl[place]  [phone]       [facility] [address]   [place] [facility] [address]        Equal Access to Services     YULIANA BUSBY AH: Hadii zohra ku hadasho Soomaali, waaxda luqadaha, qaybta kaalmada adeegyada, otis schultz. So [facility] [phone].    ATENCIÓN: Si habla español, tiene a crowder disposición servicios gratuitos de asistencia lingüística. Lon al [phone].    We comply with applicable federal civil rights laws and [place] laws. We do not discriminate on the basis of race, color, national origin, age, disability sex, sexual orientation or gender identity.            Thank you!     Thank you for choosing [facility]  for your care. Our goal is always to provide you with excellent care. Hearing back from our patients is one way we can  continue to improve our services. Please take a few minutes to complete the written survey that you may receive in the mail after your visit with us. Thank you!             Your Updated Medication List - Protect others around you: Learn how to safely use, store and throw away your medicines at www.disposemymeds.org.          This list is accurate as of: 8/3/17 12:50 PM.  Always use your most recent med list.                   Brand Name Dispense Instructions for use Diagnosis    calcium carbonate 500 MG chewable tablet    TUMS     Take 1 chew tab by mouth 3 times daily as needed for heartburn        hydrOXYzine 25 MG tablet    ATARAX    60 tablet    Take 2-4 tablets ( mg) by mouth every 6 hours as needed for itching    Pregnancy related back pain in third trimester, antepartum       prenatal multivitamin  plus iron 27-0.8 MG Tabs per tablet      Take 1 tablet by mouth daily

## 2017-08-03 NOTE — PROGRESS NOTES
"Transfer of Care     Ms. Michelle Torres 27 year old  34w2d presents for prenatal care.   Reports ongoing low back pain and occasional ctx. Would like something to cut the edge off of the back pain especially to get her to sleep.   Denies LOF, VB.   Reports fetal movement.   She was seen a week ago for lower pelvic cramping and was discharged with antibiotics for empiric treatment of UTI; however, urine culture returned negative and she was instructed to discontinue.   Pregnancy has thus far been uncomplicated.   She established prenatal care through [facility].    Exam:   /80 (BP Location: Right arm, Patient Position: Chair, Cuff Size: Adult Regular)  Pulse 109  Ht 5' 4\" (1.626 m)  Wt 137 lb (62.1 kg)  LMP 2016 (LMP Unknown)  Breastfeeding? No  BMI 23.52 kg/m2  General Appearance: NAD  Abdomen: Gravid, NT.   Pelvic: Deferred    Refer to flow sheet above.     Prenatal labs reviewed from Care Everywhere; Rh positive; rubella equivocal; infectious disease panel negative, urine culture negative. 1 hr GCT not seen. Hgb 10.2 on 2017.  Prenatal ultrasound reviewed from Care Everywhere; all appear normal.     A/P: 27 year old  at 34w2d  -- concerns addressed above, reassurance provided  -- vistaril prescribed for back discomfort and difficulty sleeping  -- PTL precautions reviewed  RTC in 1-2 weeks     Clare Murrell MD  [facility]                    "

## 2017-08-15 ENCOUNTER — TELEPHONE (OUTPATIENT)
Dept: OBGYN | Facility: CLINIC | Age: 27
End: 2017-08-15

## 2017-08-15 DIAGNOSIS — Z34.93 PRENATAL CARE IN THIRD TRIMESTER: Primary | ICD-10-CM

## 2017-08-15 NOTE — TELEPHONE ENCOUNTER
Reason for Call:  Please fax order for breast pump    Please fax to: [phone]    Detailed comments: see above    Phone Number Patient can be reached at: [phone]    Best Time: any    Can we leave a detailed message on this number? YES    Call taken on 8/15/2017 at 3:10 PM by Saray Doyle

## 2017-08-15 NOTE — TELEPHONE ENCOUNTER
Prescription generated.  Signed by Dr. Murrell  Given to  for garima.    Angy Aguilar   Ob/Gyn Clinic  RN

## 2017-08-18 ENCOUNTER — PRENATAL OFFICE VISIT (OUTPATIENT)
Dept: OBGYN | Facility: CLINIC | Age: 27
End: 2017-08-18
Payer: COMMERCIAL

## 2017-08-18 VITALS
SYSTOLIC BLOOD PRESSURE: 130 MMHG | WEIGHT: 139.6 LBS | DIASTOLIC BLOOD PRESSURE: 78 MMHG | BODY MASS INDEX: 23.83 KG/M2 | HEIGHT: 64 IN | HEART RATE: 106 BPM

## 2017-08-18 DIAGNOSIS — Z34.80 PRENATAL CARE, SUBSEQUENT PREGNANCY, UNSPECIFIED TRIMESTER: Primary | ICD-10-CM

## 2017-08-18 DIAGNOSIS — Z28.39 RUBELLA NON-IMMUNE STATUS, ANTEPARTUM: ICD-10-CM

## 2017-08-18 DIAGNOSIS — O09.899 RUBELLA NON-IMMUNE STATUS, ANTEPARTUM: ICD-10-CM

## 2017-08-18 DIAGNOSIS — D50.9 IRON DEFICIENCY ANEMIA, UNSPECIFIED IRON DEFICIENCY ANEMIA TYPE: ICD-10-CM

## 2017-08-18 PROCEDURE — 59425 ANTEPARTUM CARE ONLY: CPT | Performed by: OBSTETRICS & GYNECOLOGY

## 2017-08-18 PROCEDURE — 99207 ZZC PRENATAL VISIT: CPT | Performed by: OBSTETRICS & GYNECOLOGY

## 2017-08-18 PROCEDURE — 87653 STREP B DNA AMP PROBE: CPT | Performed by: OBSTETRICS & GYNECOLOGY

## 2017-08-18 NOTE — MR AVS SNAPSHOT
After Visit Summary   8/18/2017    Michelle Torres    MRN: [id number]           Patient Information     Date Of Birth          1990        Visit Information        Provider Department      8/18/2017 10:00 AM Hank Aranda,  [facility]        Today's Diagnoses     Prenatal care, subsequent pregnancy, unspecified trimester    -  1    Iron deficiency anemia, unspecified iron deficiency anemia type        Rubella non-immune status, antepartum           Follow-ups after your visit        Future tests that were ordered for you today     Open Future Orders        Priority Expected Expires Ordered    US OB Ltd One Or More Fetus FU/Repeat Routine  8/18/2018 8/18/2017            Who to contact     If you have questions or need follow up information about today's clinic visit or your schedule please contact [facility] directly at [phone].  Normal or non-critical lab and imaging results will be communicated to you by [employer], letter or phone within 4 business days after the clinic has received the results. If you do not hear from us within 7 days, please contact the clinic through [employer] or phone. If you have a critical or abnormal lab result, we will notify you by phone as soon as possible.  Submit refill requests through [employer] or call your pharmacy and they will forward the refill request to us. Please allow 3 business days for your refill to be completed.          Additional Information About Your Visit        MyChart Information     [employer] gives you secure access to your electronic health record. If you see a primary care provider, you can also send messages to your care team and make appointments. If you have questions, please call your primary care clinic.  If you do not have a primary care provider, please call [phone] and they will assist you.        Care EveryWhere ID     This is your Care EveryWhere ID. This could be used by other organizations to  "access your [place] medical records  [id number]        Your Vitals Were     Pulse Height Last Period Breastfeeding? BMI (Body Mass Index)       106 5' 3.5\" (1.613 m) 09/03/2016 (LMP Unknown) No 24.34 kg/m2        Blood Pressure from Last 3 Encounters:   08/18/17 130/78   08/03/17 118/80   07/27/17 121/71    Weight from Last 3 Encounters:   08/18/17 139 lb 9.6 oz (63.3 kg)   08/03/17 137 lb (62.1 kg)   06/12/17 125 lb 9.6 oz (57 kg)              We Performed the Following     Group B strep PCR        Primary Care Provider Office Phone # Fax #    Rochelle Garcia DO [place] [phone] [address] [place] [address]        Equal Access to Services     TERI [facility] : Hadii aad ku hadashmerrill Soben, waaxda luqadaha, qaybta kaalmada adetimoteo, otis sandoval . So [facility] [phone].    ATENCIÓN: Si habla español, tiene a crowder disposición servicios gratuitos de asistencia lingüística. Lon al [phone].    We comply with applicable federal civil rights laws and [place] laws. We do not discriminate on the basis of race, color, national origin, age, disability sex, sexual orientation or gender identity.            Thank you!     Thank you for choosing [facility]  for your care. Our goal is always to provide you with excellent care. Hearing back from our patients is one way we can continue to improve our services. Please take a few minutes to complete the written survey that you may receive in the mail after your visit with us. Thank you!             Your Updated Medication List - Protect others around you: Learn how to safely use, store and throw away your medicines at www.disposemymeds.org.          This list is accurate as of: 8/18/17 10:33 AM.  Always use your most recent med list.                   Brand Name Dispense Instructions for use Diagnosis    calcium carbonate 500 MG chewable tablet    TUMS     Take 1 chew tab by mouth 3 times daily as needed for " heartburn        hydrOXYzine 25 MG tablet    ATARAX    60 tablet    Take 2-4 tablets ( mg) by mouth every 6 hours as needed for itching    Pregnancy related back pain in third trimester, antepartum       order for DME     1 Device    Breast pump    Prenatal care in third trimester       prenatal multivitamin plus iron 27-0.8 MG Tabs per tablet      Take 1 tablet by mouth daily

## 2017-08-18 NOTE — PROGRESS NOTES
"Doing well. Denies VB, ctx, or LOF. +FM  Concerns: No   Have you experienced any visual changes, intractable HA, unrelenting RUQ/epigastric pain, progressive edema of the LE/hands/face, or seizures?: No   /78 (BP Location: Right arm, Patient Position: Chair, Cuff Size: Adult Regular)  Pulse 106  Ht 5' 3.5\" (1.613 m)  Wt 139 lb 9.6 oz (63.3 kg)  LMP 2016 (LMP Unknown)  Breastfeeding? No  BMI 24.34 kg/m2  General Appearance: NAD  Abdomen: Gravid, NT  Refer to flow sheet above.   A/P: 27 year old  at 36w3d    Discussed importance of completing her Glucola.  The pathophysiology of gestational diabetes and how it can affect the pregnancy as well as the  period was reviewed. She will complete this on Monday morning.  GBS taken today.   S<D --> check U/S (likely due to low presenting part)  Any pertinent labs and/or ultrasounds were reviewed. Daily FMC recommended.   Follow-up in 1 weeks     --    Hank Aranda, DO  [facility]    "

## 2017-08-18 NOTE — NURSING NOTE
"Chief Complaint   Patient presents with     Prenatal Care       Initial /78 (BP Location: Right arm, Patient Position: Chair, Cuff Size: Adult Regular)  Pulse 106  Ht 5' 3.5\" (1.613 m)  Wt 139 lb 9.6 oz (63.3 kg)  LMP 09/03/2016 (LMP Unknown)  Breastfeeding? No  BMI 24.34 kg/m2 Estimated body mass index is 24.34 kg/(m^2) as calculated from the following:    Height as of this encounter: 5' 3.5\" (1.613 m).    Weight as of this encounter: 139 lb 9.6 oz (63.3 kg).  Medication Reconciliation: complete   Karissa Garcia, HUGO      "

## 2017-08-19 LAB
GP B STREP DNA SPEC QL NAA+PROBE: NEGATIVE
SPECIMEN SOURCE: NORMAL

## 2017-08-21 ENCOUNTER — RADIANT APPOINTMENT (OUTPATIENT)
Dept: ULTRASOUND IMAGING | Facility: CLINIC | Age: 27
End: 2017-08-21
Attending: OBSTETRICS & GYNECOLOGY
Payer: COMMERCIAL

## 2017-08-21 DIAGNOSIS — Z34.80 PRENATAL CARE, SUBSEQUENT PREGNANCY, UNSPECIFIED TRIMESTER: ICD-10-CM

## 2017-08-21 PROCEDURE — 76816 OB US FOLLOW-UP PER FETUS: CPT

## 2017-08-22 DIAGNOSIS — Z34.93 PRENATAL CARE IN THIRD TRIMESTER: ICD-10-CM

## 2017-08-22 LAB
GLUCOSE 1H P 50 G GLC PO SERPL-MCNC: 161 MG/DL (ref 60–129)
HGB BLD-MCNC: 8.2 G/DL (ref 11.7–15.7)

## 2017-08-22 PROCEDURE — 36415 COLL VENOUS BLD VENIPUNCTURE: CPT | Performed by: FAMILY MEDICINE

## 2017-08-22 PROCEDURE — 85018 HEMOGLOBIN: CPT | Performed by: FAMILY MEDICINE

## 2017-08-22 PROCEDURE — 82950 GLUCOSE TEST: CPT | Performed by: FAMILY MEDICINE

## 2017-08-22 PROCEDURE — 86780 TREPONEMA PALLIDUM: CPT | Performed by: FAMILY MEDICINE

## 2017-08-23 LAB — T PALLIDUM IGG+IGM SER QL: NEGATIVE

## 2017-08-25 ENCOUNTER — HOSPITAL ENCOUNTER (OUTPATIENT)
Facility: CLINIC | Age: 27
Discharge: HOME | End: 2017-08-26
Attending: OBSTETRICS & GYNECOLOGY | Admitting: OBSTETRICS & GYNECOLOGY
Payer: COMMERCIAL

## 2017-08-25 DIAGNOSIS — Z34.93 PRENATAL CARE IN THIRD TRIMESTER: Primary | ICD-10-CM

## 2017-08-25 PROCEDURE — 81001 URINALYSIS AUTO W/SCOPE: CPT | Performed by: OBSTETRICS & GYNECOLOGY

## 2017-08-25 PROCEDURE — 87210 SMEAR WET MOUNT SALINE/INK: CPT | Performed by: OBSTETRICS & GYNECOLOGY

## 2017-08-25 PROCEDURE — 84112 EVAL AMNIOTIC FLUID PROTEIN: CPT | Performed by: OBSTETRICS & GYNECOLOGY

## 2017-08-25 NOTE — IP AVS SNAPSHOT
[facility]    [address] [address]    Phone:  [phone]    Fax:  [phone]                                       After Visit Summary   8/25/2017    Michelle Torres    MRN: [id number]           After Visit Summary Signature Page     I have received my discharge instructions, and my questions have been answered. I have discussed any challenges I see with this plan with the nurse or doctor.    ..........................................................................................................................................  Patient/Patient Representative Signature      ..........................................................................................................................................  Patient Representative Print Name and Relationship to Patient    ..................................................               ................................................  Date                                            Time    ..........................................................................................................................................  Reviewed by Signature/Title    ...................................................              ..............................................  Date                                                            Time

## 2017-08-25 NOTE — IP AVS SNAPSHOT
MRN:[id number]                      After Visit Summary   8/25/2017    Michelle Torres    MRN: [id number]           Thank you!     Thank you for choosing [facility] for your care. Our goal is always to provide you with excellent care. Hearing back from our patients is one way we can continue to improve our services. Please take a few minutes to complete the written survey that you may receive in the mail after you visit with us. Thank you!        Patient Information     Date Of Birth          1990        About your hospital stay     You were admitted on:  August 25, 2017 You last received care in the:  [facility]    You were discharged on:  August 26, 2017       Who to Call     For medical emergencies, please call 911.  For non-urgent questions about your medical care, please call your primary care provider or clinic, [phone]          Attending Provider     Provider Hank Guillory DO OB/Gyn       Primary Care Provider Office Phone # Fax #    Rochelle Garcia DO [place] [phone]      Your next 10 appointments already scheduled     Aug 28, 2017  9:45 AM CDT   [facility]ABL[facility]anda DO   [facility]    [address] [address] [phone]              Further instructions from your care team       Discharge Instructions for Undelivered Patients    Diet:    Drink 8 to 12 glasses of liquids (milk, juice, water) every day.    You may eat meals and snacks..    Activity:    Count fetal kicks every day. (See handout.)    Call your doctor if your baby is moving less than usual.    Medicines:    My care team has reviewed my medicines with me.    My care team has given me a list of my medicines.    My care team has prescribed a new medicine. They have either sent it home with me or ordered it from the pharmacy.     Macrobid 100 mg by mouth 2 times per day for 7 days.  Prescription sent to [facility] per request.     Call your provider if you notice:    Swelling in your face or increased swelling in your hands or legs.    Headaches that are not relieved by Tylenol (acetaminophen).    Changes in your vision (blurring; seeing spots or stars).    Nausea (sick to your stomach) and vomiting (throwing up).    Weight gain of 5 pounds per week.    Heartburn that doesn't go away.    Signs of bladder infection: pain when you urinate (use the toilet), needing to go more often or more urgently.    The bag of penny (membranes) breaks, or you notice leaking in your underwear.    Bright red blood in your underwear.    Abdominal (lower belly) or stomach pain.    For first baby: Contractions (tightenings) less than 5 minutes apart for one hour or more.    For Second (plus) baby: Contractions (tightenings) less than 10 minutes apart and getting stronger.    Increase or change in vaginal discharge (note the color and amount).    Other: Birthplace:[phone].    Follow up with your provider: Keep your appointment with Dr Aranda on Monday, 8/28/17. Contact [facility] at any time with questions and/or concerns.       Pending Results     No orders found for last 3 day(s).            Admission Information     Date & Time Provider Department Dept. Phone    8/25/2017 Hank Aranda, [facility] [phone]      Your Vitals Were     Last Period                   09/03/2016 (LMP Unknown)           MyChart Information     [employer] gives you secure access to your electronic health record. If you see a primary care provider, you can also send messages to your care team and make appointments. If you have questions, please call your primary care clinic.  If you do not have a primary care provider, please call [phone] and they will assist you.        Care EveryWhere ID     This is your Care EveryWhere ID. This could be used by other organizations to access your [place]  medical records  [id number]        Equal Access to Services     YULIANA BUSBY : Hadii zohra Kerr, viktor ambriz, king jane, otis schultz. So [facility] [phone].    ATENCIÓN: Si habla español, tiene a crowder disposición servicios gratuitos de asistencia lingüística. Llame al [phone].    We comply with applicable federal civil rights laws and [place] laws. We do not discriminate on the basis of race, color, national origin, age, disability sex, sexual orientation or gender identity.               Review of your medicines      UNREVIEWED medicines. Ask your doctor about these medicines        Dose / Directions    calcium carbonate 500 MG chewable tablet   Commonly known as:  TUMS        Dose:  1 chew tab   Take 1 chew tab by mouth 3 times daily as needed for heartburn   Refills:  0       FERROUS GLUCONATE PO   Indication:  Iron Deficiency        Dose:  324 mg   Take 324 mg by mouth daily (with breakfast)   Refills:  0       hydrOXYzine 25 MG tablet   Commonly known as:  ATARAX   Used for:  Pregnancy related back pain in third trimester, antepartum        Dose:   mg   Take 2-4 tablets ( mg) by mouth every 6 hours as needed for itching   Quantity:  60 tablet   Refills:  1       prenatal multivitamin plus iron 27-0.8 MG Tabs per tablet        Dose:  1 tablet   Take 1 tablet by mouth daily   Refills:  0         START taking        Dose / Directions    nitroFURantoin (macrocrystal-monohydrate) 100 MG capsule   Commonly known as:  MACROBID   Used for:  Prenatal care in third trimester        Dose:  100 mg   Take 1 capsule (100 mg) by mouth 2 times daily for 14 days   Quantity:  28 capsule   Refills:  0         CONTINUE these medicines which have NOT CHANGED        Dose / Directions    order for DME   Used for:  Prenatal care in third trimester        Breast pump   Quantity:  1 Device   Refills:  0            Where to get your medicines      These  medications were sent to [id number] - [place], [place] - [address] AT [place] [address], [place] [address]     Phone:  [phone]     nitroFURantoin (macrocrystal-monohydrate) 100 MG capsule                Protect others around you: Learn how to safely use, store and throw away your medicines at www.disposemymeds.org.             Medication List: This is a list of all your medications and when to take them. Check marks below indicate your daily home schedule. Keep this list as a reference.      Medications           Morning Afternoon Evening Bedtime As Needed    calcium carbonate 500 MG chewable tablet   Commonly known as:  TUMS   Take 1 chew tab by mouth 3 times daily as needed for heartburn                                FERROUS GLUCONATE PO   Take 324 mg by mouth daily (with breakfast)                                hydrOXYzine 25 MG tablet   Commonly known as:  ATARAX   Take 2-4 tablets ( mg) by mouth every 6 hours as needed for itching                                nitroFURantoin (macrocrystal-monohydrate) 100 MG capsule   Commonly known as:  MACROBID   Take 1 capsule (100 mg) by mouth 2 times daily for 14 days                                order for DME   Breast pump                                prenatal multivitamin plus iron 27-0.8 MG Tabs per tablet   Take 1 tablet by mouth daily                                          More Information        Kick Counts  It s normal to worry about your baby s health. One way you can know your baby s doing well is to record the baby s movements once a day. This is called a kick count. You will usually feel your baby move by the 20th week of pregnancy. Remember to take your kick count records to all your appointments with your healthcare provider.       How to Count Kicks    Choose a time when the baby is active, such as after a meal.     Sit comfortably or lie on your side.     The first time the baby  moves, write down the time.     Count each movement until the baby has moved 10 times. This can take from 20 minutes to 2 hours.     If the baby hasn t moved 4 times in 1 hour, pat your stomach to wake the baby up.    Write down the time you feel the baby s 10th movement.    Try to do it at the same time each day.  When to Call Your Healthcare Provider  Call your healthcare provider right away if you notice any of the following:    Your baby moves fewer than 10 times in 2 hours while you re doing kick counts.    Your baby moves much less often than on the days before.    You have not felt your baby move all day.    4683-4022 [facility], [address] [place] [address], [place] [address]. All rights reserved. This information is not intended as a substitute for professional medical care. Always follow your healthcare professional's instructions.

## 2017-08-26 VITALS
RESPIRATION RATE: 16 BRPM | TEMPERATURE: 98.6 F | SYSTOLIC BLOOD PRESSURE: 113 MMHG | DIASTOLIC BLOOD PRESSURE: 65 MMHG | HEART RATE: 100 BPM

## 2017-08-26 LAB
A1 MICROGLOB PLACENTAL VAG QL: NEGATIVE
ALBUMIN UR-MCNC: 10 MG/DL
AMORPH CRY #/AREA URNS HPF: ABNORMAL /HPF
APPEARANCE UR: ABNORMAL
BACTERIA #/AREA URNS HPF: ABNORMAL /HPF
BILIRUB UR QL STRIP: NEGATIVE
COLOR UR AUTO: YELLOW
GLUCOSE UR STRIP-MCNC: NEGATIVE MG/DL
HGB UR QL STRIP: NEGATIVE
KETONES UR STRIP-MCNC: NEGATIVE MG/DL
LEUKOCYTE ESTERASE UR QL STRIP: ABNORMAL
MUCOUS THREADS #/AREA URNS LPF: PRESENT /LPF
NITRATE UR QL: NEGATIVE
PH UR STRIP: 7.5 PH (ref 5–7)
RBC #/AREA URNS AUTO: 3 /HPF (ref 0–2)
SOURCE: ABNORMAL
SP GR UR STRIP: 1.02 (ref 1–1.03)
SPECIMEN SOURCE: NORMAL
SQUAMOUS #/AREA URNS AUTO: 3 /HPF (ref 0–1)
UROBILINOGEN UR STRIP-MCNC: NORMAL MG/DL (ref 0–2)
WBC #/AREA URNS AUTO: 22 /HPF (ref 0–2)
WET PREP SPEC: NORMAL

## 2017-08-26 PROCEDURE — 99214 OFFICE O/P EST MOD 30 MIN: CPT

## 2017-08-26 RX ORDER — NITROFURANTOIN 25; 75 MG/1; MG/1
100 CAPSULE ORAL 2 TIMES DAILY
Qty: 28 CAPSULE | Refills: 0 | Status: SHIPPED | OUTPATIENT
Start: 2017-08-26 | End: 2017-09-09

## 2017-08-26 NOTE — PROGRESS NOTES
P:1. 37+3 wks. Admits to TR2 per w/c c/o leaking small amounts of fluid several times since ~ 17. [facility] is not sure if it was mucous or fluid. [facility] has not felt fetal movement since ~ 17. C/O B/A. Denies regular contr or vaginal bleeding. Oriented to room et plan of care. External monitors applied. Reactive/Category 1 tracing obtained et verified w/J Meaghan VEGA. Cervix posterior et presenting part not applied. No fluid noted. Small amount of whitish vaginal discharge noted. Dr Aranda informed per phone of hx, s/s, UA, FHT's, VS, cervix, labs, etc. Orders received. Pt able to rest et awoken w/lab results et plan of care. Questions answered. [facility] feels comfortable d/c'ing home. Requests rx be sent to [facility]. E-scribe et phone message left. Pt [facility] has a few Macrobid at home from previous tx of UTI et will take one when arrives at home. [facility] has experienced numerous UTI's throughout pregnancy. Encourage pt to discuss w/MD at appt on Monday. Written et verbal instructions given w/verbalization of understanding. Agrees to call w/any questions and/or concerns. Discharge per ambulatory in stable condition to awaiting vehicle. Accompanied by support person.

## 2017-08-26 NOTE — DISCHARGE INSTRUCTIONS
Discharge Instructions for Undelivered Patients    Diet:    Drink 8 to 12 glasses of liquids (milk, juice, water) every day.    You may eat meals and snacks..    Activity:    Count fetal kicks every day. (See handout.)    Call your doctor if your baby is moving less than usual.    Medicines:    My care team has reviewed my medicines with me.    My care team has given me a list of my medicines.    My care team has prescribed a new medicine. They have either sent it home with me or ordered it from the pharmacy.     Macrobid 100 mg by mouth 2 times per day for 7 days. Prescription sent to [facility] per request.     Call your provider if you notice:    Swelling in your face or increased swelling in your hands or legs.    Headaches that are not relieved by Tylenol (acetaminophen).    Changes in your vision (blurring; seeing spots or stars).    Nausea (sick to your stomach) and vomiting (throwing up).    Weight gain of 5 pounds per week.    Heartburn that doesn't go away.    Signs of bladder infection: pain when you urinate (use the toilet), needing to go more often or more urgently.    The bag of penny (membranes) breaks, or you notice leaking in your underwear.    Bright red blood in your underwear.    Abdominal (lower belly) or stomach pain.    For first baby: Contractions (tightenings) less than 5 minutes apart for one hour or more.    For Second (plus) baby: Contractions (tightenings) less than 10 minutes apart and getting stronger.    Increase or change in vaginal discharge (note the color and amount).    Other: Birthplace:[phone].    Follow up with your provider: Keep your appointment with Dr Aranda on Monday, 8/28/17. Contact [facility] at any time with questions and/or concerns.

## 2017-08-27 ENCOUNTER — HOSPITAL ENCOUNTER (INPATIENT)
Facility: CLINIC | Age: 27
LOS: 2 days | Discharge: HOME | End: 2017-08-29
Attending: OBSTETRICS & GYNECOLOGY | Admitting: OBSTETRICS & GYNECOLOGY
Payer: COMMERCIAL

## 2017-08-27 DIAGNOSIS — D50.9 IRON DEFICIENCY ANEMIA, UNSPECIFIED IRON DEFICIENCY ANEMIA TYPE: Primary | ICD-10-CM

## 2017-08-27 PROBLEM — O26.899 CRAMPING AFFECTING PREGNANCY, ANTEPARTUM: Status: ACTIVE | Noted: 2017-08-27

## 2017-08-27 PROBLEM — R10.9 CRAMPING AFFECTING PREGNANCY, ANTEPARTUM: Status: ACTIVE | Noted: 2017-08-27

## 2017-08-27 LAB
ABO + RH BLD: NORMAL
ABO + RH BLD: NORMAL
ALBUMIN UR-MCNC: 10 MG/DL
AMORPH CRY #/AREA URNS HPF: ABNORMAL /HPF
APPEARANCE UR: ABNORMAL
BILIRUB UR QL STRIP: NEGATIVE
COLOR UR AUTO: YELLOW
ERYTHROCYTE [DISTWIDTH] IN BLOOD BY AUTOMATED COUNT: 16.6 % (ref 10–15)
GLUCOSE UR STRIP-MCNC: NEGATIVE MG/DL
HCT VFR BLD AUTO: 27.1 % (ref 35–47)
HGB BLD-MCNC: 8.3 G/DL (ref 11.7–15.7)
HGB UR QL STRIP: NEGATIVE
KETONES UR STRIP-MCNC: NEGATIVE MG/DL
LEUKOCYTE ESTERASE UR QL STRIP: ABNORMAL
MCH RBC QN AUTO: 22.4 PG (ref 26.5–33)
MCHC RBC AUTO-ENTMCNC: 30.6 G/DL (ref 31.5–36.5)
MCV RBC AUTO: 73 FL (ref 78–100)
NITRATE UR QL: NEGATIVE
PH UR STRIP: 8.5 PH (ref 5–7)
PLATELET # BLD AUTO: 559 10E9/L (ref 150–450)
RBC # BLD AUTO: 3.71 10E12/L (ref 3.8–5.2)
RBC #/AREA URNS AUTO: 2 /HPF (ref 0–2)
SOURCE: ABNORMAL
SP GR UR STRIP: 1.01 (ref 1–1.03)
SPECIMEN EXP DATE BLD: NORMAL
SQUAMOUS #/AREA URNS AUTO: 10 /HPF (ref 0–1)
UROBILINOGEN UR STRIP-MCNC: NORMAL MG/DL (ref 0–2)
WBC # BLD AUTO: 21.9 10E9/L (ref 4–11)
WBC #/AREA URNS AUTO: 10 /HPF (ref 0–2)

## 2017-08-27 PROCEDURE — 85027 COMPLETE CBC AUTOMATED: CPT | Performed by: OBSTETRICS & GYNECOLOGY

## 2017-08-27 PROCEDURE — 59410 OBSTETRICAL CARE: CPT | Performed by: OBSTETRICS & GYNECOLOGY

## 2017-08-27 PROCEDURE — 72200001 ZZH LABOR CARE VAGINAL DELIVERY SINGLE

## 2017-08-27 PROCEDURE — 86900 BLOOD TYPING SEROLOGIC ABO: CPT | Performed by: OBSTETRICS & GYNECOLOGY

## 2017-08-27 PROCEDURE — 86901 BLOOD TYPING SEROLOGIC RH(D): CPT | Performed by: OBSTETRICS & GYNECOLOGY

## 2017-08-27 PROCEDURE — 25000132 ZZH RX MED GY IP 250 OP 250 PS 637: Performed by: OBSTETRICS & GYNECOLOGY

## 2017-08-27 PROCEDURE — 99215 OFFICE O/P EST HI 40 MIN: CPT

## 2017-08-27 PROCEDURE — 81001 URINALYSIS AUTO W/SCOPE: CPT | Performed by: OBSTETRICS & GYNECOLOGY

## 2017-08-27 PROCEDURE — 10907ZC DRAINAGE OF AMNIOTIC FLUID, THERAPEUTIC FROM PRODUCTS OF CONCEPTION, VIA NATURAL OR ARTIFICIAL OPENING: ICD-10-PCS | Performed by: OBSTETRICS & GYNECOLOGY

## 2017-08-27 PROCEDURE — 12000031 ZZH R&B OB CRITICAL

## 2017-08-27 PROCEDURE — 87086 URINE CULTURE/COLONY COUNT: CPT | Performed by: OBSTETRICS & GYNECOLOGY

## 2017-08-27 PROCEDURE — 36415 COLL VENOUS BLD VENIPUNCTURE: CPT | Performed by: OBSTETRICS & GYNECOLOGY

## 2017-08-27 PROCEDURE — 12000027 ZZH R&B OB

## 2017-08-27 PROCEDURE — 25000125 ZZHC RX 250: Performed by: OBSTETRICS & GYNECOLOGY

## 2017-08-27 RX ORDER — OXYCODONE AND ACETAMINOPHEN 5; 325 MG/1; MG/1
1 TABLET ORAL
Status: DISCONTINUED | OUTPATIENT
Start: 2017-08-27 | End: 2017-08-27

## 2017-08-27 RX ORDER — IBUPROFEN 100 MG/5ML
800 SUSPENSION, ORAL (FINAL DOSE FORM) ORAL ONCE
Status: COMPLETED | OUTPATIENT
Start: 2017-08-27 | End: 2017-08-27

## 2017-08-27 RX ORDER — SODIUM CHLORIDE, SODIUM LACTATE, POTASSIUM CHLORIDE, CALCIUM CHLORIDE 600; 310; 30; 20 MG/100ML; MG/100ML; MG/100ML; MG/100ML
INJECTION, SOLUTION INTRAVENOUS CONTINUOUS
Status: DISCONTINUED | OUTPATIENT
Start: 2017-08-27 | End: 2017-08-27

## 2017-08-27 RX ORDER — NITROFURANTOIN 25; 75 MG/1; MG/1
100 CAPSULE ORAL 2 TIMES DAILY
Status: DISCONTINUED | OUTPATIENT
Start: 2017-08-27 | End: 2017-08-29 | Stop reason: HOSPADM

## 2017-08-27 RX ORDER — IBUPROFEN 800 MG/1
800 TABLET, FILM COATED ORAL
Status: DISCONTINUED | OUTPATIENT
Start: 2017-08-27 | End: 2017-08-27

## 2017-08-27 RX ORDER — METHYLERGONOVINE MALEATE 0.2 MG/ML
200 INJECTION INTRAVENOUS
Status: DISCONTINUED | OUTPATIENT
Start: 2017-08-27 | End: 2017-08-27

## 2017-08-27 RX ORDER — NALOXONE HYDROCHLORIDE 0.4 MG/ML
.1-.4 INJECTION, SOLUTION INTRAMUSCULAR; INTRAVENOUS; SUBCUTANEOUS
Status: DISCONTINUED | OUTPATIENT
Start: 2017-08-27 | End: 2017-08-27

## 2017-08-27 RX ORDER — CARBOPROST TROMETHAMINE 250 UG/ML
250 INJECTION, SOLUTION INTRAMUSCULAR
Status: DISCONTINUED | OUTPATIENT
Start: 2017-08-27 | End: 2017-08-27

## 2017-08-27 RX ORDER — IBUPROFEN 100 MG/5ML
400 SUSPENSION, ORAL (FINAL DOSE FORM) ORAL EVERY 4 HOURS PRN
Status: DISCONTINUED | OUTPATIENT
Start: 2017-08-27 | End: 2017-08-29 | Stop reason: HOSPADM

## 2017-08-27 RX ORDER — ONDANSETRON 2 MG/ML
4 INJECTION INTRAMUSCULAR; INTRAVENOUS EVERY 6 HOURS PRN
Status: DISCONTINUED | OUTPATIENT
Start: 2017-08-27 | End: 2017-08-27

## 2017-08-27 RX ORDER — ACETAMINOPHEN 325 MG/1
650 TABLET ORAL EVERY 4 HOURS PRN
Status: DISCONTINUED | OUTPATIENT
Start: 2017-08-27 | End: 2017-08-27

## 2017-08-27 RX ORDER — MISOPROSTOL 200 UG/1
400 TABLET ORAL
Status: DISCONTINUED | OUTPATIENT
Start: 2017-08-27 | End: 2017-08-29 | Stop reason: HOSPADM

## 2017-08-27 RX ORDER — OXYTOCIN 10 [USP'U]/ML
10 INJECTION, SOLUTION INTRAMUSCULAR; INTRAVENOUS
Status: DISCONTINUED | OUTPATIENT
Start: 2017-08-27 | End: 2017-08-27

## 2017-08-27 RX ORDER — OXYTOCIN/0.9 % SODIUM CHLORIDE 30/500 ML
100 PLASTIC BAG, INJECTION (ML) INTRAVENOUS CONTINUOUS
Status: DISCONTINUED | OUTPATIENT
Start: 2017-08-27 | End: 2017-08-29 | Stop reason: HOSPADM

## 2017-08-27 RX ORDER — NALOXONE HYDROCHLORIDE 0.4 MG/ML
.1-.4 INJECTION, SOLUTION INTRAMUSCULAR; INTRAVENOUS; SUBCUTANEOUS
Status: DISCONTINUED | OUTPATIENT
Start: 2017-08-27 | End: 2017-08-29 | Stop reason: HOSPADM

## 2017-08-27 RX ORDER — OXYTOCIN/0.9 % SODIUM CHLORIDE 30/500 ML
340 PLASTIC BAG, INJECTION (ML) INTRAVENOUS CONTINUOUS PRN
Status: DISCONTINUED | OUTPATIENT
Start: 2017-08-27 | End: 2017-08-29 | Stop reason: HOSPADM

## 2017-08-27 RX ORDER — OXYTOCIN/0.9 % SODIUM CHLORIDE 30/500 ML
100-340 PLASTIC BAG, INJECTION (ML) INTRAVENOUS CONTINUOUS PRN
Status: COMPLETED | OUTPATIENT
Start: 2017-08-27 | End: 2017-08-27

## 2017-08-27 RX ORDER — LANOLIN 100 %
OINTMENT (GRAM) TOPICAL
Status: DISCONTINUED | OUTPATIENT
Start: 2017-08-27 | End: 2017-08-29 | Stop reason: HOSPADM

## 2017-08-27 RX ORDER — BISACODYL 10 MG
10 SUPPOSITORY, RECTAL RECTAL DAILY PRN
Status: DISCONTINUED | OUTPATIENT
Start: 2017-08-29 | End: 2017-08-29 | Stop reason: HOSPADM

## 2017-08-27 RX ORDER — OXYTOCIN 10 [USP'U]/ML
10 INJECTION, SOLUTION INTRAMUSCULAR; INTRAVENOUS
Status: DISCONTINUED | OUTPATIENT
Start: 2017-08-27 | End: 2017-08-29 | Stop reason: HOSPADM

## 2017-08-27 RX ORDER — HYDROCORTISONE 2.5 %
CREAM (GRAM) TOPICAL 3 TIMES DAILY PRN
Status: DISCONTINUED | OUTPATIENT
Start: 2017-08-27 | End: 2017-08-29 | Stop reason: HOSPADM

## 2017-08-27 RX ORDER — AMOXICILLIN 250 MG
1-2 CAPSULE ORAL 2 TIMES DAILY
Status: DISCONTINUED | OUTPATIENT
Start: 2017-08-27 | End: 2017-08-29 | Stop reason: HOSPADM

## 2017-08-27 RX ADMIN — OXYTOCIN-SODIUM CHLORIDE 0.9% IV SOLN 30 UNIT/500ML 340 ML/HR: 30-0.9/5 SOLUTION at 20:38

## 2017-08-27 RX ADMIN — Medication: at 21:47

## 2017-08-27 RX ADMIN — IBUPROFEN 800 MG: 100 SUSPENSION ORAL at 21:37

## 2017-08-27 NOTE — PLAN OF CARE
Problem: Labor (Cervical Ripen, Induct, Augment) (Adult,Obstetrics,Pediatric)  Goal: Signs and Symptoms of Listed Potential Problems Will be Absent or Manageable (Labor)  Signs and symptoms of listed potential problems will be absent or manageable by discharge/transition of care (reference Labor (Cervical Ripen, Induct, Augment) (Adult,Obstetrics,Pediatric) CPG).  Outcome: Improving  SVE 4 cm. Dr Bautista notified of admission and labor status. Pt moved to 2051 and admitted to L & D. Declines need for pain medication at this time, up to ambulate with FOB now. Will continue to monitor.

## 2017-08-27 NOTE — IP AVS SNAPSHOT
MRN:[id number]                      After Visit Summary   8/27/2017    Michelle Torres    MRN: [id number]           Thank you!     Thank you for choosing [facility] for your care. Our goal is always to provide you with excellent care. Hearing back from our patients is one way we can continue to improve our services. Please take a few minutes to complete the written survey that you may receive in the mail after you visit with us. Thank you!        Patient Information     Date Of Birth          1990        About your hospital stay     You were admitted on:  August 27, 2017 You last received care in the:  [facility]    You were discharged on:  August 29, 2017       Who to Call     For medical emergencies, please call 911.  For non-urgent questions about your medical care, please call your primary care provider or clinic, [phone]          Attending Provider     Provider Ray Mark MD OB/Gyn       Primary Care Provider Office Phone # Fax #    Rochelel Garcia DO [place] [phone]      Further instructions from your care team       Postpartum Vaginal Delivery Instructions    Activity       Ask family and friends for help when you need it.    Do not place anything in your vagina for 6 weeks.    You are not restricted on other activities, but take it easy for a few weeks to allow your body to recover from delivery.  You are able to do any activities you feel up to that point.    No driving until you have stopped taking your pain medications (usually two weeks after delivery).     Call your health care provider if you have any of these symptoms:       Increased pain, swelling, redness, or fluid around your stiches from an episiotomy or perineal tear.    A fever above 100.4 F (38 C) with or without chills when placing a thermometer under your tongue.    You soak a sanitary pad with blood within 1 hour, or you see blood clots larger than a golf  ball.    Bleeding that lasts more than 6 weeks.    Vaginal discharge that smells bad.    Severe pain, cramping or tenderness in your lower belly area.    A need to urinate more frequently (use the toilet more often), more urgently (use the toilet very quickly), or it burns when you urinate.    Nausea and vomiting.    Redness, swelling or pain around a vein in your leg.    Problems breastfeeding or a red or painful area on your breast.    Chest pain and cough or are gasping for air.    Problems coping with sadness, anxiety, or depression.  If you have any concerns about hurting yourself or the baby, call your provider immediately.     You have questions or concerns after you return home.     Keep your hands clean:  Always wash your hands before touching your perineal area and stitches.  This helps reduce your risk of infection.  If your hands aren't dirty, you may use an alcohol hand-rub to clean your hands. Keep your nails clean and short.        Pending Results     No orders found from 8/25/2017 to 8/28/2017.            Statement of Approval     Ordered          08/29/17 0902  I have reviewed and agree with all the recommendations and orders detailed in this document.  EFFECTIVE NOW     Approved and electronically signed by:  Ray Bautista MD             Admission Information     Date & Time Provider Department Dept. Phone    8/27/2017 Ray Bautista MD [facility] [phone]      Your Vitals Were     Blood Pressure Pulse Temperature Respirations Last Period Pulse Oximetry    96/50 84 98.2  F (36.8  C) (Axillary) 16 09/03/2016 (LMP Unknown) 98%      MyChart Information     [employer] gives you secure access to your electronic health record. If you see a primary care provider, you can also send messages to your care team and make appointments. If you have questions, please call your primary care clinic.  If you do not have a primary care provider, please call [phone] and they will  assist you.        Care EveryWhere ID     This is your Care EveryWhere ID. This could be used by other organizations to access your [place] medical records  [id number]        Equal Access to Services     YULIANA BUSBY : Brittany Kerr, viktor ambriz, king kaesau jane, waxrenzo marleenin hayaascotty phanchelita palmer lori schultz. So [facility] [phone].    ATENCIÓN: Si habla español, tiene a crowder disposición servicios gratuitos de asistencia lingüística. Llame al [phone].    We comply with applicable federal civil rights laws and [place] laws. We do not discriminate on the basis of race, color, national origin, age, disability sex, sexual orientation or gender identity.               Review of your medicines      CONTINUE these medicines which may have CHANGED, or have new prescriptions. If we are uncertain of the size of tablets/capsules you have at home, strength may be listed as something that might have changed.        Dose / Directions    ferrous gluconate 325 (36 FE) MG Tabs   Indication:  Iron Deficiency   This may have changed:    - medication strength  - when to take this   Used for:  Iron deficiency anemia, unspecified iron deficiency anemia type        Dose:  324 mg   Take 324 mg by mouth 2 times daily   Quantity:  100 tablet   Refills:  1         CONTINUE these medicines which have NOT CHANGED        Dose / Directions    calcium carbonate 500 MG chewable tablet   Commonly known as:  TUMS        Dose:  1 chew tab   Take 1 chew tab by mouth 3 times daily as needed for heartburn   Refills:  0       nitroFURantoin (macrocrystal-monohydrate) 100 MG capsule   Commonly known as:  MACROBID   Used for:  Prenatal care in third trimester        Dose:  100 mg   Take 1 capsule (100 mg) by mouth 2 times daily for 14 days   Quantity:  28 capsule   Refills:  0       order for DME   Used for:  Prenatal care in third trimester        Breast pump   Quantity:  1 Device   Refills:  0       prenatal multivitamin  plus iron 27-0.8 MG Tabs per tablet        Dose:  1 tablet   Take 1 tablet by mouth daily   Refills:  0            Where to get your medicines      These medications were sent to [id number] - [place] - [address] AT [place] [address] [place] [address]     Phone:  [phone]     ferrous gluconate 325 (36 FE) MG Tabs                Protect others around you: Learn how to safely use, store and throw away your medicines at www.disposemymeds.org.             Medication List: This is a list of all your medications and when to take them. Check marks below indicate your daily home schedule. Keep this list as a reference.      Medications           Morning Afternoon Evening Bedtime As Needed    calcium carbonate 500 MG chewable tablet   Commonly known as:  TUMS   Take 1 chew tab by mouth 3 times daily as needed for heartburn                                ferrous gluconate 325 (36 FE) MG Tabs   Take 324 mg by mouth 2 times daily                                nitroFURantoin (macrocrystal-monohydrate) 100 MG capsule   Commonly known as:  MACROBID   Take 1 capsule (100 mg) by mouth 2 times daily for 14 days   Last time this was given:  100 mg on 8/28/2017 12:04 AM                                order for DME   Breast pump                                prenatal multivitamin plus iron 27-0.8 MG Tabs per tablet   Take 1 tablet by mouth daily

## 2017-08-27 NOTE — IP AVS SNAPSHOT
[facility]    [address] [address]    Phone:  [phone]    Fax:  [phone]                                       After Visit Summary   8/27/2017    Michelle Torres    MRN: [id number]           After Visit Summary Signature Page     I have received my discharge instructions, and my questions have been answered. I have discussed any challenges I see with this plan with the nurse or doctor.    ..........................................................................................................................................  Patient/Patient Representative Signature      ..........................................................................................................................................  Patient Representative Print Name and Relationship to Patient    ..................................................               ................................................  Date                                            Time    ..........................................................................................................................................  Reviewed by Signature/Title    ...................................................              ..............................................  Date                                                            Time

## 2017-08-28 LAB
BACTERIA SPEC CULT: NORMAL
BACTERIA SPEC CULT: NORMAL
SPECIMEN SOURCE: NORMAL

## 2017-08-28 PROCEDURE — 12000031 ZZH R&B OB CRITICAL

## 2017-08-28 PROCEDURE — 25000128 H RX IP 250 OP 636: Performed by: OBSTETRICS & GYNECOLOGY

## 2017-08-28 PROCEDURE — 25000132 ZZH RX MED GY IP 250 OP 250 PS 637: Performed by: OBSTETRICS & GYNECOLOGY

## 2017-08-28 RX ORDER — ONDANSETRON 2 MG/ML
4 INJECTION INTRAMUSCULAR; INTRAVENOUS EVERY 6 HOURS PRN
Status: DISCONTINUED | OUTPATIENT
Start: 2017-08-28 | End: 2017-08-29 | Stop reason: HOSPADM

## 2017-08-28 RX ORDER — LIDOCAINE HYDROCHLORIDE 10 MG/ML
INJECTION, SOLUTION EPIDURAL; INFILTRATION; INTRACAUDAL; PERINEURAL
Status: DISPENSED
Start: 2017-08-28 | End: 2017-08-28

## 2017-08-28 RX ADMIN — IBUPROFEN 400 MG: 100 SUSPENSION ORAL at 11:52

## 2017-08-28 RX ADMIN — NITROFURANTOIN MONOHYDRATE/MACROCRYSTALLINE 100 MG: 25; 75 CAPSULE ORAL at 00:04

## 2017-08-28 RX ADMIN — IBUPROFEN 400 MG: 100 SUSPENSION ORAL at 20:13

## 2017-08-28 RX ADMIN — ONDANSETRON 4 MG: 2 INJECTION INTRAMUSCULAR; INTRAVENOUS at 01:12

## 2017-08-28 RX ADMIN — IBUPROFEN 400 MG: 100 SUSPENSION ORAL at 04:20

## 2017-08-28 NOTE — PLAN OF CARE
Problem: Goal Outcome Summary  Goal: Goal Outcome Summary  Outcome: Improving  1745- oob ambulating in hallway. Voiding without difficulty. Breastfeeding- LC assisting with latch and positioning.   Nipples sore and intact- using lanolin. Pain relief adequate with pain medications per patient.

## 2017-08-28 NOTE — PLAN OF CARE
Problem: Goal Outcome Summary  Goal: Goal Outcome Summary  Report given to Sandra Beard RN who assumes responsibility for her care.

## 2017-08-28 NOTE — PROGRESS NOTES
Late entry 2136: Report received from Geeta BHATT RN; assumed care of patient.     Pt VSS, postpartum assessments WDL - fundus firm, midline, U/1. Small amounts lochia rubra; no clots; no odor.  Pt eating and drinking; denies nausea.  Pt given PRN ibuprofen for perineal pain - pain reassessed after one hour and pt denied pain.   Educated pt on normal postpartum healing and recovery as well as s/s of complications.   Breastfeeding infant on demand; pt reports sore nipples - lanolin applied.  Pt and FOB bonding appropriately with baby. Questions encouraged and answered. Pt reports she is comfortable and will call with needs.

## 2017-08-28 NOTE — PROGRESS NOTES
[facility] Obstetrics Post-Partum Progress Note          Assessment and Plan:    Assessment:   Post-partum day #1  Normal spontaneous vaginal delivery  L&D complications: Intrauterine pregnancy at 37w5d   weeks gestation      Doing well.  Clean wound without signs of infection.  Normal healing wound.  No immediate surgical complications identified.  No excessive bleeding  Pain well-controlled.      Plan:   Ambulation encouraged  Breast feeding strategies discussed  Monitor wound for signs of infection  Pain control measures as needed  Reportable signs and symptoms dicussed with the patient             Interval History:   Doing well.  Pain is well-controlled.  No fevers.  No history of foul-smelling vaginal discharge.  Good appetite.  Denies chest pain, shortness of breath, nausea or vomiting.  Vaginal bleeding is similar to a heavy menstrual flow.  Ambulatory.  Breastfeeding well.            Significant Problems:      Past Medical History:   Diagnosis Date     Chickenpox      Depression      Other abnormal heart sounds     present since birth             Review of Systems:    The patient denies any chest pain, shortness of breath, excessive pain, fever, chills, purulent drainage from the wound, nausea or vomiting.          Medications:     All medications related to the patient's surgery have been reviewed  Current Facility-Administered Medications   Medication     ondansetron (ZOFRAN) injection 4 mg     lidocaine (PF) (XYLOCAINE) 1 % injection     nitroFURantoin (macrocrystal-monohydrate) (MACROBID) capsule 100 mg     oxytocin (PITOCIN) 30 units in 500 mL 0.9% NaCl infusion     naloxone (NARCAN) injection 0.1-0.4 mg     senna-docusate (SENOKOT-S;PERICOLACE) 8.6-50 MG per tablet 1-2 tablet     [START ON 8/29/2017] bisacodyl (DULCOLAX) Suppository 10 mg     [START ON 8/29/2017] sodium phosphate (FLEET ENEMA) 1 enema     hydrocortisone 2.5 % cream     lanolin ointment     lactated ringers BOLUS 1,000 mL      oxytocin (PITOCIN) 30 units in 500 mL 0.9% NaCl infusion     oxytocin (PITOCIN) injection 10 Units     misoprostol (CYTOTEC) tablet 400 mcg     NO Rho (D) immune globulin (RhoGam) needed - mother Rh POSITIVE     No MMR Needed - Assessment: Patient does not need MMR vaccine     No Tdap Needed - Assessment: Patient does not need Tdap vaccine     ibuprofen (ADVIL/MOTRIN) suspension 400 mg     acetaminophen (TYLENOL) solution 650 mg             Physical Exam:   All vitals stable  Temp: 98.6  F (37  C) Temp src: Oral BP: 109/68 Pulse: 84   Resp: 18 SpO2: 94 %        Uterine fundus is firm, non-tender and at the level of the umbilicus          Data:     All laboratory data related to this surgery reviewed  Hemoglobin   Date Value Ref Range Status   08/27/2017 8.3 (L) 11.7 - 15.7 g/dL Final   08/22/2017 8.2 (L) 11.7 - 15.7 g/dL Final   04/19/2017 10.2 (A) 11.7 - 15.7 g/dL Final   04/09/2014 9.7 (L) 11.7 - 15.7 g/dL Final   09/06/2013 9.6 (L) 11.7 - 15.7 g/dL Final     No imaging studies have been orderedCait Guzman MD

## 2017-08-28 NOTE — PLAN OF CARE
Problem: Labor (Cervical Ripen, Induct, Augment) (Adult,Obstetrics,Pediatric)  Goal: Signs and Symptoms of Listed Potential Problems Will be Absent or Manageable (Labor)  Signs and symptoms of listed potential problems will be absent or manageable by discharge/transition of care (reference Labor (Cervical Ripen, Induct, Augment) (Adult,Obstetrics,Pediatric) CPG).   Outcome: Completed Date Met:  17  S:Delivery  B:Spontaneous Labor,37w5d    Lab Results   Component Value Date     GBS Negative 2017    with antibiotic treatment not indicated 4 hours prior to delivery.  A: Patient delivered  none, intact at  with Dr. SHANNAN Bautista in attendance and baby placed on mother's abdomen for delayed cord clamping. Baby dried and stimulated. Baby placed  skin to skin @ .. Apgars 8/9.  IV infusion of Oxytocin  infused. Placenta removal spontaneous. See Flowsheet for VS and PP checks. Labor care plan goals met, transition now to postpartum care.  R: Expect routine postpartum care. Anticipate first feeding within the hour or whenever infant displays feeding cues. Continue skin to skin. Prior discussion with mother indicates that feeding plan is Breast feeding . Educated mother on importance of exclusive breastfeeding, expected feeding readiness cues and encouraged her to observe for these cues while rooming in. Informed her that breastfeeding assistance would be provided.

## 2017-08-28 NOTE — PLAN OF CARE
Problem: Discharge Planning  Goal: Discharge Planning (Adult, OB, Behavioral, Peds)  Outcome: Improving  Data: Vital signs within normal limits. Postpartum checks within normal limits - see flow record. Patient eating and drinking normally. Patient able to empty bladder independently. Patient ambulating independently. No apparent signs of infection. perineum healing well. Patient is performing self cares and is able to care for infant. Positive attachment behaviors are observed with infant. Support persons are present.  Action:  Pain plan was discussed. Patient will request pain med when she is ready for it. Patient was medicated during the shift for pain and cramping. See MAR.Patient education done about hand hygiene, breastfeeding,  cares, postpartum cares, pain management/plan, fall risk,  safety and rest. See flow record.  Response:   Patient reassessed within 1 hour after each medication for pain. Patient stated that pain had improved. Patient stated that she was comfortable.  Plan: Anticipate discharge on 17.

## 2017-08-28 NOTE — L&D DELIVERY NOTE
"Michelle Torres is a 27 year old  @ 37+5 Weeks EGA  She presented with active labor @ 11:00  GBS negative  Membrane status: AROM  Labored with:   Pain meds None  FHR Cat I  Delivered a viable Female  @ 20:31 over intact perineum  Weight 6#8 oz   APGAR's 8/9  Placenta delivered @ 20:36 , was complete with a 3vessel cord  Laceration none     ml  \"Edith\"  Delivery Summary    Michelle Torres MRN# [id number]   Age: 27 year old YOB: 1990     ASSESSMENT & PLAN:         Labor Event Times    Labor onset date:  17 Onset time:  11:00 AM   Dilation complete date:  17 Complete time:   8:24 PM   Start pushing date/time:  2017            Labor Length    1st Stage (hrs):  9 (min):  24   2nd Stage (hrs):  0 (min):  7   3rd Stage (hrs):  0 (min):  5      Labor Events     labor?:  No   Labor Type:  Spontaneous      Antibiotics received during labor?:  No      Rupture date/time:     Rupture type:  Artificial Rupture of Membranes   Fluid color:  Clear      1:1 continuous labor support provided by?:  RN Labor partogram used?:  no         Delivery/Placenta Date and Time    Delivery Date:  17 Delivery Time:   8:31 PM   Placenta Date/Time:  2017  8:36 PM   Oxytocin given at the time of delivery:  after delivery of baby      Vaginal Counts    Initial count performed by 2 team members:   Two Team Members   FEDE Antunez RN          Needles Suture Benson Sponges Instruments   Initial counts 2  10    Added to count       Final counts 2  10       Placed during labor Accounted for at the end of labor   No NA   No NA   No NA      Final count performed by 2 team members:   Two Team Members   Dr Lily Antunez RN         Final count correct?:  Yes         Apgars    Living status:  Living    1 Minute 5 Minute 10 Minute 15 Minute 20 Minute   Skin color: 0  1       Heart rate: 2  2       Reflex irritability: 2  2       Muscle tone: 2  2       Respiratory effort: 2  2   " "    Total: 8  9          Apgars assigned by:  JUAN J VITAL RN      Cord    Vessels:  3 Vessels Complications:  None   Cord Blood Disposition:  Lab Gases Sent?:  No         [place] Resuscitation    Methods:  None         [place] Measurements    Weight:  6 lb 8 oz Length:  1' 7\"   Head circumference:  32.4 cm       Skin to Skin and Feeding Plan    Skin to skin initiation date/time: 17   Skin to skin with:  Mother   Skin to skin end date/time:     How do you plan to feed your baby:  Breastfeeding      Labor Events and Shoulder Dystocia    Fetal Tracing Prior to Delivery:  Category 1   Shoulder dystocia present?:  Neg            Delivery (Maternal) (Provider to Complete) (129282)    Episiotomy:  None   Est. blood loss (mL):  200         Mother's Information  Mother: Michelle Torres #[id number]    Start of Mother's Information     IO Blood Loss  17 1100 - 17 2118    Mom's I/O Activity            End of Mother's Information  Mother: WiMichelle garner #[id number]            Delivery - Provider to Complete ([id number])    Delivering clinician:  NINFA BAUTISTA   Attempted Delivery Types (Choose all that apply):  Spontaneous Vaginal Delivery   Delivery Type (Choose the 1 that will go to the Birth History):  Vaginal, Spontaneous Delivery                     Other personnel:   Provider Role   DEVAN VITAL Delivery Nurse   MAURICE OJEDA Charge Nurse            Placenta    Delayed Cord Clamping:  Done   Date/Time:  2017  8:36 PM   Removal:  Spontaneous   Comments:  complete w/ 3V cord   Disposition:  Hospital disposal      Anesthesia    Method:  None         Presentation and Position    Presentation:  Vertex   Position:  Left Occiput Anterior                    Ninfa Bautista MD      "
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
## 2017-08-29 VITALS
RESPIRATION RATE: 16 BRPM | SYSTOLIC BLOOD PRESSURE: 96 MMHG | HEART RATE: 84 BPM | OXYGEN SATURATION: 98 % | DIASTOLIC BLOOD PRESSURE: 50 MMHG | TEMPERATURE: 98.2 F

## 2017-08-29 PROCEDURE — 90707 MMR VACCINE SC: CPT | Performed by: OBSTETRICS & GYNECOLOGY

## 2017-08-29 PROCEDURE — 25000128 H RX IP 250 OP 636: Performed by: OBSTETRICS & GYNECOLOGY

## 2017-08-29 PROCEDURE — 25000132 ZZH RX MED GY IP 250 OP 250 PS 637: Performed by: OBSTETRICS & GYNECOLOGY

## 2017-08-29 RX ADMIN — IBUPROFEN 400 MG: 100 SUSPENSION ORAL at 14:06

## 2017-08-29 RX ADMIN — IBUPROFEN 400 MG: 100 SUSPENSION ORAL at 02:32

## 2017-08-29 NOTE — PLAN OF CARE
Problem: Postpartum, Vaginal Delivery (Adult)  Goal: Signs and Symptoms of Listed Potential Problems Will be Absent or Manageable (Postpartum, Vaginal Delivery)  Signs and symptoms of listed potential problems will be absent or manageable by discharge/transition of care (reference Postpartum, Vaginal Delivery (Adult) CPG).   Outcome: Improving  Pt follows PP pathway without incident, tolerates po meds for pain which work well for her.  She is up & about independently caring for herself & her .  She is breastfeeding, that is going well.  FOB present & supportive, bonding well; infant to  nursery per parents request while they go outside.  Anticipate discharge later today, will continue to monitor & update as needed.

## 2017-08-29 NOTE — DISCHARGE SUMMARY
[facility] Discharge Summary    Michelle Torres MRN# [id number]   Age: 27 year old YOB: 1990     Date of Admission:  8/27/2017  Date of Discharge::  8/29/2017  Admitting Physician:  Ray Bautista MD  Discharge Physician:  Ray Bautista MD     Home clinic: [facility]          Admission Diagnoses:   Cramping affecting pregnancy, antepartum  Indication for care in labor and delivery, antepartum          Discharge Diagnosis:   Normal spontaneous vaginal delivery  Intrauterine pregnancy at 37+5 weeks gestation          Procedures:   Procedure(s): No additional procedures performed       No other procedures performed during this admission           Medications Prior to Admission:     Prescriptions Prior to Admission   Medication Sig Dispense Refill Last Dose     FERROUS GLUCONATE PO Take 324 mg by mouth daily (with breakfast)   8/26/2017 at hs     nitroFURantoin, macrocrystal-monohydrate, (MACROBID) 100 MG capsule Take 1 capsule (100 mg) by mouth 2 times daily for 14 days 28 capsule 0 8/26/2017 at hs     calcium carbonate (TUMS) 500 MG chewable tablet Take 1 chew tab by mouth 3 times daily as needed for heartburn   8/26/2017 at hs     Prenatal Vit-Fe Fumarate-FA (PRENATAL MULTIVITAMIN  PLUS IRON) 27-0.8 MG TABS per tablet Take 1 tablet by mouth daily   8/26/2017 at hs     order for DME Breast pump 1 Device 0 future             Discharge Medications:     Current Discharge Medication List      CONTINUE these medications which have NOT CHANGED    Details   FERROUS GLUCONATE PO Take 324 mg by mouth daily (with breakfast)      nitroFURantoin, macrocrystal-monohydrate, (MACROBID) 100 MG capsule Take 1 capsule (100 mg) by mouth 2 times daily for 14 days  Qty: 28 capsule, Refills: 0    Associated Diagnoses: Prenatal care in third trimester      calcium carbonate (TUMS) 500 MG chewable tablet Take 1 chew tab by mouth 3 times daily as needed for heartburn      Prenatal Vit-Fe  "Fumarate-FA (PRENATAL MULTIVITAMIN  PLUS IRON) 27-0.8 MG TABS per tablet Take 1 tablet by mouth daily      order for DME Breast pump  Qty: 1 Device, Refills: 0    Associated Diagnoses: Prenatal care in third trimester                   Consultations:   No consultations were requested during this admission          Brief History of Labor:   Expand All Collapse All    []Hide copied text  []Hover for attribution information  Michelle Torres is a 27 year old  @ 37+5 Weeks EGA  She presented with active labor @ 11:00  GBS negative  Membrane status: AROM  Labored with:   Pain meds None  FHR Cat I  Delivered a viable Female  @ 20:31 over intact perineum  Weight 6#8 oz   APGAR's 8/9  Placenta delivered @ 20:36 , was complete with a 3vessel cord  Laceration none      ml  \"Edith\"                Hospital Course:   The patient's hospital course was unremarkable.  On discharge, her pain was well controlled. Vaginal bleeding is similar to peak menstrual flow.  Voiding without difficulty.  Ambulating well and tolerating a normal diet.  No fever.  Breastfeeding well.  Infant is stable.  No bowel movement yet.*  She was discharged on post-partum day #2.  BP 96/50  Pulse 84  Temp 98.2  F (36.8  C) (Axillary)  Resp 16  LMP 2016 (LMP Unknown)  SpO2 98%  Breastfeeding? Unknown  Exam:  Constitutional: healthy, alert and no distress  Gastrointestinal: Abdomen soft, non-tender. BS normal. No masses, organomegaly FF@U-3  : Deferred  Musculoskeletal: extremities normal- no gross deformities noted, gait normal and normal muscle tone  Skin: no suspicious lesions or rashes  Neurologic: Gait normal. Reflexes normal and symmetric. Sensation grossly WNL.  Psychiatric: mentation appears normal and affect normal/bright      Post-partum hemoglobin:   Hemoglobin   Date Value Ref Range Status   2017 8.3 (L) 11.7 - 15.7 g/dL Final             Discharge Instructions and Follow-Up:   Discharge diet: Regular "   Discharge activity: Pelvic rest: abstain from intercourse and do not use tampons for 6 week(s)   Discharge follow-up: Follow up with   in 6 weeks   Wound care: Drink plenty of fluids           Discharge Disposition:   Discharged to home      Attestation:  I have reviewed today's vital signs, notes, medications, labs and imaging.  Amount of time performed on this discharge summary: 7 minutes.    Ray Bautista MD

## 2017-08-29 NOTE — PROGRESS NOTES
Pt dc'd to home stable.  Verbalized understanding of dc instructions.  Enc pt to call with concerns.

## 2017-08-30 ENCOUNTER — TELEPHONE (OUTPATIENT)
Dept: FAMILY MEDICINE | Facility: CLINIC | Age: 27
End: 2017-08-30

## 2017-08-30 NOTE — TELEPHONE ENCOUNTER
Family has an appointment with Dr Ramos this morning and can discuss wit physician. Sherlyn Britt RN

## 2017-08-30 NOTE — TELEPHONE ENCOUNTER
Pt called requesting recommendation on infant formula as breast feeding is not working for her.    Thank you,    Karissa Jiménez, [place]

## 2017-09-22 ENCOUNTER — TELEPHONE (OUTPATIENT)
Dept: OBGYN | Facility: CLINIC | Age: 27
End: 2017-09-22

## 2017-09-22 NOTE — TELEPHONE ENCOUNTER
Please generate letter for patient excusing her from missing school. Patient  delivered a baby on 8/27/17 and is planning to return to school work on 9/25/17.  Will have Dr. William sign in Dr. Bautista's absence.    Thank you.    Angy Aguilar   [facility] Clinic  RN

## 2017-09-22 NOTE — TELEPHONE ENCOUNTER
Cannot email.  Spoke to pt - letter mailed.    -Jacque PACHECO [facility]  Clinic Station

## 2017-09-22 NOTE — LETTER
[facility]  [address] [place] [address]  [phone]    2017      Michelle Torres                                                                                                                                [address] [address]      To Whom It May Concern:      Michelle Torres ( [birth year]) is a patient at [facility].  She has been off from school due to delivering a child on 17.  She is able to return to school with no restrictions as of 17.  If you have any questions or need further information, please contact us at [phone].  Thank you for coming to [facility] for your health care needs.        Sincerely,          Erika Orlnado MD

## 2017-09-22 NOTE — TELEPHONE ENCOUNTER
Reason for Call:  Patient is calling in requesting note for school stating that she was unable to attend class  For 2-3 weeks because she was having a baby    Patient is requesting this be emailed to her at : [email or web address]    Detailed comments: see above    Phone Number Patient can be reached at: [phone]    Best Time: any    Can we leave a detailed message on this number? YES    Call taken on 9/22/2017 at 10:37 AM by Saray Doyle

## 2018-02-20 ENCOUNTER — NURSE TRIAGE (OUTPATIENT)
Dept: NURSING | Facility: CLINIC | Age: 28
End: 2018-02-20

## 2018-02-20 ENCOUNTER — TELEPHONE (OUTPATIENT)
Dept: FAMILY MEDICINE | Facility: CLINIC | Age: 28
End: 2018-02-20

## 2018-02-21 ENCOUNTER — HOSPITAL ENCOUNTER (EMERGENCY)
Facility: CLINIC | Age: 28
Discharge: HOME | End: 2018-02-21
Attending: PHYSICIAN ASSISTANT | Admitting: PHYSICIAN ASSISTANT
Payer: MEDICAID

## 2018-02-21 VITALS
HEIGHT: 63 IN | TEMPERATURE: 97.8 F | OXYGEN SATURATION: 98 % | HEART RATE: 98 BPM | SYSTOLIC BLOOD PRESSURE: 111 MMHG | DIASTOLIC BLOOD PRESSURE: 68 MMHG | RESPIRATION RATE: 18 BRPM

## 2018-02-21 DIAGNOSIS — K08.89 TOOTH PAIN: ICD-10-CM

## 2018-02-21 PROCEDURE — G0463 HOSPITAL OUTPT CLINIC VISIT: HCPCS

## 2018-02-21 PROCEDURE — 99213 OFFICE O/P EST LOW 20 MIN: CPT | Performed by: PHYSICIAN ASSISTANT

## 2018-02-21 RX ORDER — AMOXICILLIN 400 MG/5ML
875 POWDER, FOR SUSPENSION ORAL 2 TIMES DAILY
Qty: 218 ML | Refills: 0 | Status: SHIPPED | OUTPATIENT
Start: 2018-02-21 | End: 2018-03-03

## 2018-02-21 NOTE — TELEPHONE ENCOUNTER
Dr. Carlisle,  Please see note below from RN triage and advise your recommendation, thank you.    GARY Spicer

## 2018-02-21 NOTE — TELEPHONE ENCOUNTER
"\"I have an infected tooth, can I get an antibiotic called in?\"  Patient states that she has a cavity in her front tooth and a small chip in it.   Has a dental appointment in 2 and 1/2 weeks.  Upper gums are swollen.   Rates pain 4/10, higher if eating.   Taking Tylenol  Denies fever.  Is requesting an antibiotic be called in. Would use the [facility].   States that her dental office is closed for the next week.     Will send a message to the clinic to call the Patient tomorrow.     Reason for Disposition    Toothache present > 24 hours    Additional Information    Negative: Shock suspected (e.g., cold/pale/clammy skin, too weak to stand, low BP, rapid pulse)    Negative: [1] Similar pain previously AND [2] it was from \"heart attack\"    Negative: [1] Similar pain previously AND [2] it was from \"angina\" AND [3] not relieved by nitroglycerin    Negative: Sounds like a life-threatening emergency to the triager    Negative: Tongue is very swollen and tender    Negative: [1] Face is swollen AND [2] fever    Negative: Patient sounds very sick or weak to the triager    Negative: [1] SEVERE pain (e.g., excruciating, unable to do any normal activities) AND [2] not improved 2 hours after pain medicine    Negative: Face is very swollen    Negative: Fever    Negative: [1] Face is swollen AND [2] no fever    Protocols used: TOOTHACHE-ADULT-    "

## 2018-02-21 NOTE — TELEPHONE ENCOUNTER
"Clinic Action Needed:Yes  [facility] Triage Call  Presenting Problem:    \"I have an infected tooth, can I get an antibiotic called in?\"  Patient states that she has a cavity in her front tooth and a small chip in it.   Has a dental appointment in 2 and 1/2 weeks.  Upper gums are swollen.   Rates pain 4/10, higher if eating.   Taking Tylenol  Denies fever.  Is requesting an antibiotic be called in. Would use the [facility].   States that her dental office is closed for the next week so is unable to get an antibiotic from them.      Routed to:Dr Carlisle/nurse acosta.    Beata Biswas RN/ [facility]        "

## 2018-02-21 NOTE — TELEPHONE ENCOUNTER
Patient notified needs to be seen, appointment tomorrow with PCP at 10:20 is scheduled. Pt denies pain, advised tylenol or ibuprofen for pain control if does experience any.    GARY Spicer

## 2018-02-21 NOTE — ED AVS SNAPSHOT
[facility] Emergency Department    [address] [address]    Phone:  [phone]    Fax:  [phone]                                       Michelle Torres   MRN: [id number]    Department:  [facility] Emergency Department   Date of Visit:  2/21/2018           After Visit Summary Signature Page     I have received my discharge instructions, and my questions have been answered. I have discussed any challenges I see with this plan with the nurse or doctor.    ..........................................................................................................................................  Patient/Patient Representative Signature      ..........................................................................................................................................  Patient Representative Print Name and Relationship to Patient    ..................................................               ................................................  Date                                            Time    ..........................................................................................................................................  Reviewed by Signature/Title    ...................................................              ..............................................  Date                                                            Time

## 2018-02-21 NOTE — ED AVS SNAPSHOT
[facility] Emergency Department    [address] [facility] [address]    Phone:  [phone]    Fax:  [phone]                                       Michelle Torres   MRN: [id number]    Department:  [facility] Emergency Department   Date of Visit:  2/21/2018           Patient Information     Date Of Birth          1990        Your diagnoses for this visit were:     Tooth pain        You were seen by Dann Menendez PA-C.      Discharge References/Attachments     TOOTH ABSCESS (ENGLISH)      Future Appointments        Provider Department Dept Phone Center    2/22/2018 10:20 AM Rochelle Carlisle, DO [facility] [phone] [facility]      24 Hour Appointment Hotline       To make an appointment at any [facility], call [phone] ([phone]). If you don't have a family doctor or clinic, we will help you find one. [facility] clinics are conveniently located to serve the needs of you and your family.             Review of your medicines      START taking        Dose / Directions Last dose taken    amoxicillin 400 MG/5ML suspension   Commonly known as:  AMOXIL   Dose:  875 mg   Quantity:  218 mL        Take 10.9 mLs (875 mg) by mouth 2 times daily for 10 days   Refills:  0          Our records show that you are taking the medicines listed below. If these are incorrect, please call your family doctor or clinic.        Dose / Directions Last dose taken    calcium carbonate 500 MG chewable tablet   Commonly known as:  TUMS   Dose:  1 chew tab        Take 1 chew tab by mouth 3 times daily as needed for heartburn   Refills:  0        ferrous gluconate 325 (36 FE) MG Tabs   Dose:  324 mg   Indication:  Iron Deficiency   Quantity:  100 tablet        Take 324 mg by mouth 2 times daily   Refills:  1        order for DME   Quantity:  1 Device        Breast pump   Refills:  0        prenatal multivitamin plus iron 27-0.8 MG Tabs per tablet   Dose:  1 tablet        Take 1 tablet by  mouth daily   Refills:  0                Prescriptions were sent or printed at these locations (1 Prescription)                   [facility] [place], [place] - [address]   [address] [address]    Telephone:  [phone]   Fax:  [phone]   Hours:                  E-Prescribed (1 of 1)         amoxicillin (AMOXIL) 400 MG/5ML suspension                Orders Needing Specimen Collection     None      Pending Results     No orders found from 2/19/2018 to 2/22/2018.            Pending Culture Results     No orders found from 2/19/2018 to 2/22/2018.            Pending Results Instructions     If you had any lab results that were not finalized at the time of your Discharge, you can call the ED Lab Result RN at [phone]. You will be contacted by this team for any positive Lab results or changes in treatment. The nurses are available 7 days a week from 10A to 6:30P.  You can leave a message 24 hours per day and they will return your call.        Test Results From Your Hospital Stay               Thank you for choosing [facility]       Thank you for choosing [facility] for your care. Our goal is always to provide you with excellent care. Hearing back from our patients is one way we can continue to improve our services. Please take a few minutes to complete the written survey that you may receive in the mail after you visit with us. Thank you!        Mobivity[employer] Information     [employer] gives you secure access to your electronic health record. If you see a primary care provider, you can also send messages to your care team and make appointments. If you have questions, please call your primary care clinic.  If you do not have a primary care provider, please call [phone] and they will assist you.        Care EveryWhere ID     This is your Care EveryWhere ID. This could be used by other organizations to access your [facility] medical records  [id number]        Equal Access to Services      YULIANA BUSBY : Hadii zohra Kerr, waaxda luqadaha, qaybta kaalmada lucinda, otis schultz. So [facility] [phone].    ATENCIÓN: Si habla español, tiene a crowder disposición servicios gratuitos de asistencia lingüística. Llame al [phone].    We comply with applicable federal civil rights laws and [place] laws. We do not discriminate on the basis of race, color, national origin, age, disability, sex, sexual orientation, or gender identity.            After Visit Summary       This is your record. Keep this with you and show to your community pharmacist(s) and doctor(s) at your next visit.

## 2018-02-21 NOTE — ED PROVIDER NOTES
Chief Compaint:     No chief complaint on file.       HPI: Michelle Torres is a 27 year old female who presents with dental pain.  There is not facial swelling.  The pain is centered at the 9th tooth. Onset of pain was 1 week ago and is mild.  The dental pain has not been relieved with over the counter pain medications.  She is currently here with her daughter, and is going to miss her dental appointment.  She does have an appointment to have a root canal in 3 weeks.  She is requesting antibiotic for this until she can get in for her root canal.    ROS:  Further problem focused system review was otherwise negative.     Medications:  Current Outpatient Prescriptions   Medication Sig Dispense Refill     ferrous gluconate 325 (36 FE) MG TABS Take 324 mg by mouth 2 times daily 100 tablet 1     order for DME Breast pump 1 Device 0     calcium carbonate (TUMS) 500 MG chewable tablet Take 1 chew tab by mouth 3 times daily as needed for heartburn       Prenatal Vit-Fe Fumarate-FA (PRENATAL MULTIVITAMIN  PLUS IRON) 27-0.8 MG TABS per tablet Take 1 tablet by mouth daily       Allergies: Bees; Eggs; Oxycodone; and Rocephin [ceftriaxone]         Vital signs reviewed by Dann Menendez  There were no vitals taken for this visit.    Physical Exam:  General: healthy, alert and no distress  ENT: ENT exam normal, no neck nodes or sinus tenderness  Mouth: facial swelling is absent              tenderness to touch at tooth: 9              Teeth caries:no              Teeth broken: no              Visible abscess: no     Assessment:     (K08.89) Tooth pain  Plan: amoxicillin (AMOXIL) 400 MG/5ML suspension     Plan:  Patient declined dental block.  Because pain was mild.periapical abscess was presumed and therefore, antibiotics were prescribed. There was no visible abscess amenable to incision and drainage.  Follow up with dental clinic as soon as possible.  Return UC with fevers, uncontrolled pain, inability to eat/drink.     Dann DÍAZ  Shon  2/21/2018, 1:41 PM       Dann Menendez PA-C  02/21/18 1347

## 2018-03-30 ENCOUNTER — TELEPHONE (OUTPATIENT)
Dept: FAMILY MEDICINE | Facility: CLINIC | Age: 28
End: 2018-03-30

## 2018-03-30 NOTE — TELEPHONE ENCOUNTER
Received documentation for an Order for Protection    First page cut and pasted to this encounter, full document attached          MD wanted documents scanned into chart from the clinic due to subject matter.    Ricardo Zaidi RT (r)  [facility]

## 2018-04-09 ENCOUNTER — OFFICE VISIT (OUTPATIENT)
Dept: FAMILY MEDICINE | Facility: CLINIC | Age: 28
End: 2018-04-09
Payer: COMMERCIAL

## 2018-04-09 ENCOUNTER — TELEPHONE (OUTPATIENT)
Dept: FAMILY MEDICINE | Facility: CLINIC | Age: 28
End: 2018-04-09

## 2018-04-09 VITALS
SYSTOLIC BLOOD PRESSURE: 100 MMHG | RESPIRATION RATE: 16 BRPM | TEMPERATURE: 98.5 F | HEART RATE: 88 BPM | WEIGHT: 120 LBS | BODY MASS INDEX: 21.26 KG/M2 | DIASTOLIC BLOOD PRESSURE: 64 MMHG

## 2018-04-09 DIAGNOSIS — N92.1 MENORRHAGIA WITH IRREGULAR CYCLE: ICD-10-CM

## 2018-04-09 DIAGNOSIS — N83.209 CYST OF OVARY, UNSPECIFIED LATERALITY: ICD-10-CM

## 2018-04-09 DIAGNOSIS — F41.9 ANXIETY: Primary | ICD-10-CM

## 2018-04-09 PROBLEM — N92.6 IRREGULAR PERIODS: Status: ACTIVE | Noted: 2018-04-09

## 2018-04-09 LAB — BETA HCG QUAL IFA URINE: NEGATIVE

## 2018-04-09 PROCEDURE — 87491 CHLMYD TRACH DNA AMP PROBE: CPT | Performed by: NURSE PRACTITIONER

## 2018-04-09 PROCEDURE — 84703 CHORIONIC GONADOTROPIN ASSAY: CPT | Performed by: NURSE PRACTITIONER

## 2018-04-09 PROCEDURE — 99213 OFFICE O/P EST LOW 20 MIN: CPT | Performed by: NURSE PRACTITIONER

## 2018-04-09 PROCEDURE — 87591 N.GONORRHOEAE DNA AMP PROB: CPT | Performed by: NURSE PRACTITIONER

## 2018-04-09 ASSESSMENT — ENCOUNTER SYMPTOMS
DIAPHORESIS: 0
WHEEZING: 0
FEVER: 0
SHORTNESS OF BREATH: 0
COUGH: 0
LIGHT-HEADEDNESS: 0
HEADACHES: 0
DIZZINESS: 0
NERVOUS/ANXIOUS: 1
FATIGUE: 0
EYE DISCHARGE: 0
CHILLS: 0
DIARRHEA: 0
SINUS PRESSURE: 0
RHINORRHEA: 0
NAUSEA: 0
CONSTIPATION: 0
SORE THROAT: 0
EYE ITCHING: 0

## 2018-04-09 ASSESSMENT — ANXIETY QUESTIONNAIRES
3. WORRYING TOO MUCH ABOUT DIFFERENT THINGS: NEARLY EVERY DAY
5. BEING SO RESTLESS THAT IT IS HARD TO SIT STILL: NEARLY EVERY DAY
GAD7 TOTAL SCORE: 21
2. NOT BEING ABLE TO STOP OR CONTROL WORRYING: NEARLY EVERY DAY
6. BECOMING EASILY ANNOYED OR IRRITABLE: NEARLY EVERY DAY
1. FEELING NERVOUS, ANXIOUS, OR ON EDGE: NEARLY EVERY DAY
7. FEELING AFRAID AS IF SOMETHING AWFUL MIGHT HAPPEN: NEARLY EVERY DAY

## 2018-04-09 ASSESSMENT — PATIENT HEALTH QUESTIONNAIRE - PHQ9: 5. POOR APPETITE OR OVEREATING: NEARLY EVERY DAY

## 2018-04-09 ASSESSMENT — PAIN SCALES - GENERAL: PAINLEVEL: NO PAIN (0)
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
## 2018-04-09 NOTE — LETTER
April 12, 2018      Michelle MISTRY Melissa  [address]  [place] [address]        Dear ,    You are negative for sexually transmitted diseases. Please use condoms with every sexual encounter.    Resulted Orders   Neisseria gonorrhoeae PCR   Result Value Ref Range    Specimen Descrip Urine     N Gonorrhea PCR Negative NEG^Negative      Comment:      Negative for N. gonorrhoeae rRNA by transcription mediated amplification.  A negative result by transcription mediated amplification does not preclude   the presence of N. gonorrhoeae infection because results are dependent on   proper and adequate collection, absence of inhibitors, and sufficient rRNA to   be detected.     Beta HCG qual IFA urine   Result Value Ref Range    Beta HCG Qual IFA Urine Negative NEG^Negative      Chlamydia trachomatis PCR   Result Value Ref Range    Specimen Description Urine     Chlamydia Trachomatis PCR Negative NEG^Negative      Comment:      Negative for C. trachomatis rRNA by transcription mediated amplification.  A negative result by transcription mediated amplification does not preclude   the presence of C. trachomatis infection because results are dependent on   proper and adequate collection, absence of inhibitors, and sufficient rRNA to   be detected.       If you have any questions or concerns, please call the clinic at the number listed above.     Sincerely,      RUPINDER Pagan CNP / jg

## 2018-04-09 NOTE — MR AVS SNAPSHOT
After Visit Summary   4/9/2018    Michelle Torres    MRN: [id number]           Patient Information     Date Of Birth          1990        Visit Information        Provider Department      4/9/2018 10:00 AM Shahla Zamora APRN [facility]        Today's Diagnoses     Irregular periods    -  1    Anxiety           Follow-ups after your visit        Who to contact     Normal or non-critical lab and imaging results will be communicated to you by eKonnekthart, letter or phone within 4 business days after the clinic has received the results. If you do not hear from us within 7 days, please contact the clinic through eKonnekthart or phone. If you have a critical or abnormal lab result, we will notify you by phone as soon as possible.  Submit refill requests through Xooker or call your pharmacy and they will forward the refill request to us. Please allow 3 business days for your refill to be completed.          If you need to speak with a  for additional information , please call: [phone]           Additional Information About Your Visit        [email or web address] Information     [employer] gives you secure access to your electronic health record. If you see a primary care provider, you can also send messages to your care team and make appointments. If you have questions, please call your primary care clinic.  If you do not have a primary care provider, please call [phone] and they will assist you.        Care EveryWhere ID     This is your Care EveryWhere ID. This could be used by other organizations to access your [facility] medical records  [id number]        Your Vitals Were     Pulse Temperature Respirations Last Period BMI (Body Mass Index)       88 98.5  F (36.9  C) (Tympanic) 16 03/26/2018 21.26 kg/m2        Blood Pressure from Last 3 Encounters:   04/09/18 100/64   02/21/18 111/68   08/29/17 96/50    Weight from Last 3 Encounters:   04/09/18 120 lb (54.4 kg)    08/18/17 139 lb 9.6 oz (63.3 kg)   08/03/17 137 lb (62.1 kg)              Today, you had the following     No orders found for display         Today's Medication Changes          These changes are accurate as of 4/9/18 10:25 AM.  If you have any questions, ask your nurse or doctor.               Start taking these medicines.        Dose/Directions    norethindrone-ethinyl estradiol 1-35 MG-MCG per tablet   Commonly known as:  ORTHO-NOVUM 1-35 TAB,NORTREL 1-35 TAB   Used for:  Irregular periods   Started by:  Shahla Zamora APRN CNP        Dose:  1 tablet   Take 1 tablet by mouth daily Take pill contiguously   Quantity:  84 tablet   Refills:  4            Where to get your medicines      Some of these will need a paper prescription and others can be bought over the counter.  Ask your nurse if you have questions.     Bring a paper prescription for each of these medications     norethindrone-ethinyl estradiol 1-35 MG-MCG per tablet                Primary Care Provider Office Phone # Fax #    Rochelle Radha Carlisle [phone] [address] [place] [address]        Equal Access to Services     [facility] Center[facility]: Hadii zohra Kerr, waaxda luyesi, qaybta kaalmada adetimoteo, otis sandoval . So [facility] [phone].    ATENCIÓN: Si habla español, tiene a crowder disposición servicios gratuitos de asistencia lingüística. Llame al [phone].    We comply with applicable federal civil rights laws and [place] laws. We do not discriminate on the basis of race, color, national origin, age, disability, sex, sexual orientation, or gender identity.            Thank you!     Thank you for choosing [facility] [place]  for your care. Our goal is always to provide you with excellent care. Hearing back from our patients is one way we can continue to improve our services. Please take a few minutes to complete the written survey that you may receive in the mail  after your visit with us. Thank you!             Your Updated Medication List - Protect others around you: Learn how to safely use, store and throw away your medicines at www.disposemymeds.org.          This list is accurate as of 4/9/18 10:25 AM.  Always use your most recent med list.                   Brand Name Dispense Instructions for use Diagnosis    norethindrone-ethinyl estradiol 1-35 MG-MCG per tablet    ORTHO-NOVUM 1-35 TAB,NORTREL 1-35 TAB    84 tablet    Take 1 tablet by mouth daily Take pill contiguously    Irregular periods

## 2018-04-09 NOTE — NURSING NOTE
"Chief Complaint   Patient presents with     Contraception     Anxiety       Initial /64 (BP Location: Left arm, Patient Position: Sitting, Cuff Size: Adult Regular)  Pulse 88  Temp 98.5  F (36.9  C) (Tympanic)  Resp 16  Wt 120 lb (54.4 kg)  LMP 03/26/2018  BMI 21.26 kg/m2 Estimated body mass index is 21.26 kg/(m^2) as calculated from the following:    Height as of 2/21/18: 5' 3\" (1.6 m).    Weight as of this encounter: 120 lb (54.4 kg).  Medication Reconciliation: complete     April HUGO Pike      "

## 2018-04-09 NOTE — TELEPHONE ENCOUNTER
Left office prior to collecting urine sample.  Attempted to call patient.  Message left.  Please have her return for a lab only appointment to collect urine.    Shahla CURIEL

## 2018-04-10 LAB
C TRACH DNA SPEC QL NAA+PROBE: NEGATIVE
N GONORRHOEA DNA SPEC QL NAA+PROBE: NEGATIVE
SPECIMEN SOURCE: NORMAL
SPECIMEN SOURCE: NORMAL

## 2018-04-10 ASSESSMENT — ANXIETY QUESTIONNAIRES: GAD7 TOTAL SCORE: 21

## 2018-04-10 ASSESSMENT — PATIENT HEALTH QUESTIONNAIRE - PHQ9: SUM OF ALL RESPONSES TO PHQ QUESTIONS 1-9: 10

## 2018-04-11 ENCOUNTER — TELEPHONE (OUTPATIENT)
Dept: FAMILY MEDICINE | Facility: CLINIC | Age: 28
End: 2018-04-11

## 2018-04-11 NOTE — LETTER
[facility]  [address] [address]  Phone: [phone]    April 11, 2018        Michelle Torres  [facility] [facility] [address]          To Whom It May Concern:    RE: Michelle Torres    Patient was seen and treated at our clinic on 4/9/18.  She was evaluated for depression and anxiety.  She does have a few symptoms of depression however, she has more symptoms of anxiety. Given her current situation it has been determined that the amount of anxiety she is experiencing would be normal for anyone in this current situation.  At this time I would not recommend medication.  I do feel Michelle would benefit very much from counseling/therapy.    Please contact me for questions or concerns.      Sincerely,        RUPINDER Rand CNP

## 2018-07-09 ENCOUNTER — NURSE TRIAGE (OUTPATIENT)
Dept: NURSING | Facility: CLINIC | Age: 28
End: 2018-07-09

## 2018-07-10 ENCOUNTER — OFFICE VISIT (OUTPATIENT)
Dept: FAMILY MEDICINE | Facility: CLINIC | Age: 28
End: 2018-07-10
Payer: COMMERCIAL

## 2018-07-10 VITALS
OXYGEN SATURATION: 96 % | TEMPERATURE: 98.8 F | WEIGHT: 114 LBS | DIASTOLIC BLOOD PRESSURE: 70 MMHG | HEIGHT: 64 IN | SYSTOLIC BLOOD PRESSURE: 102 MMHG | BODY MASS INDEX: 19.46 KG/M2 | HEART RATE: 97 BPM

## 2018-07-10 DIAGNOSIS — K04.7 INFECTED TOOTH: Primary | ICD-10-CM

## 2018-07-10 PROCEDURE — 99213 OFFICE O/P EST LOW 20 MIN: CPT | Performed by: NURSE PRACTITIONER

## 2018-07-10 RX ORDER — AMOXICILLIN 250 MG/5ML
500 POWDER, FOR SUSPENSION ORAL 2 TIMES DAILY
Qty: 140 ML | Refills: 0 | Status: SHIPPED | OUTPATIENT
Start: 2018-07-10 | End: 2018-07-16

## 2018-07-10 NOTE — MR AVS SNAPSHOT
"              After Visit Summary   7/10/2018    Michelle Torres    MRN: [id number]           Patient Information     Date Of Birth          1990        Visit Information        Provider Department      7/10/2018 2:00 PM Jenni Torre NP [facility]        Today's Diagnoses     Infected tooth    -  1       Follow-ups after your visit        Follow-up notes from your care team     Return if symptoms worsen or fail to improve.      Who to contact     If you have questions or need follow up information about today's clinic visit or your schedule please contact [facility] directly at [phone].  Normal or non-critical lab and imaging results will be communicated to you by BoxTonehart, letter or phone within 4 business days after the clinic has received the results. If you do not hear from us within 7 days, please contact the clinic through BoxTonehart or phone. If you have a critical or abnormal lab result, we will notify you by phone as soon as possible.  Submit refill requests through 9Cookies or call your pharmacy and they will forward the refill request to us. Please allow 3 business days for your refill to be completed.          Additional Information About Your Visit        MyChart Information     9Cookies gives you secure access to your electronic health record. If you see a primary care provider, you can also send messages to your care team and make appointments. If you have questions, please call your primary care clinic.  If you do not have a primary care provider, please call [phone] and they will assist you.        Care EveryWhere ID     This is your Care EveryWhere ID. This could be used by other organizations to access your [place] medical records  [id number]        Your Vitals Were     Pulse Temperature Height Pulse Oximetry BMI (Body Mass Index)       97 98.8  F (37.1  C) (Tympanic) 5' 3.5\" (1.613 m) 96% 19.88 kg/m2        Blood Pressure from Last 3 Encounters: "   07/10/18 102/70   04/09/18 100/64   02/21/18 111/68    Weight from Last 3 Encounters:   07/10/18 114 lb (51.7 kg)   04/09/18 120 lb (54.4 kg)   08/18/17 139 lb 9.6 oz (63.3 kg)              Today, you had the following     No orders found for display         Today's Medication Changes          These changes are accurate as of 7/10/18  5:36 PM.  If you have any questions, ask your nurse or doctor.               Start taking these medicines.        Dose/Directions    amoxicillin 250 MG/5ML suspension   Commonly known as:  AMOXIL   Used for:  Infected tooth   Started by:  Jenni Torre NP        Dose:  500 mg   Take 10 mLs (500 mg) by mouth 2 times daily for 7 days   Quantity:  140 mL   Refills:  0            Where to get your medicines      Some of these will need a paper prescription and others can be bought over the counter.  Ask your nurse if you have questions.     Bring a paper prescription for each of these medications     amoxicillin 250 MG/5ML suspension                Primary Care Provider Office Phone # Fax #    Rochelle Smithjabari Carlisle [phone] [address] [place] [address]        Equal Access to Services     YULIANA BUSBY AH: Hadii zohra Kerr, wabahmanda pb, qaybta kaalmada aderipda, otis schultz. So [facility] [phone].    ATENCIÓN: Si habla español, tiene a crowder disposición servicios gratuitos de asistencia lingüística. [facility] [phone].    We comply with applicable federal civil rights laws and [place] laws. We do not discriminate on the basis of race, color, national origin, age, disability, sex, sexual orientation, or gender identity.            Thank you!     Thank you for choosing [facility]  for your care. Our goal is always to provide you with excellent care. Hearing back from our patients is one way we can continue to improve our services. Please take a few minutes to complete the written survey that  you may receive in the mail after your visit with us. Thank you!             Your Updated Medication List - Protect others around you: Learn how to safely use, store and throw away your medicines at www.disposemymeds.org.          This list is accurate as of 7/10/18  5:36 PM.  Always use your most recent med list.                   Brand Name Dispense Instructions for use Diagnosis    amoxicillin 250 MG/5ML suspension    AMOXIL    140 mL    Take 10 mLs (500 mg) by mouth 2 times daily for 7 days    Infected tooth       norethindrone-ethinyl estradiol 1-35 MG-MCG per tablet    ORTHO-NOVUM 1-35 TAB,NORTREL 1-35 TAB    84 tablet    Take 1 tablet by mouth daily Take pill continuously    Cyst of ovary, unspecified laterality, Menorrhagia with irregular cycle

## 2018-07-10 NOTE — TELEPHONE ENCOUNTER
"Patient has an absessed tooth and has dental pain. Is asking if an on call doctor would prescribe an antibiotic for her to treat it. Said she can't see her dentist for a couple of weeks. Nurse told patient that the on call doctor wouldn't prescribe an antibiotic without seeing the patient. Patient said there are no openings at her [facility] for tomorrow. Nurse told patient that she may be able to get an appointment at another clinic. Patient was transferred to the scheduling line.     Protocol reviewed.   Caller states understanding of the recommended disposition.    Yasmin Robles RN/FNA      Reason for Disposition    Toothache present > 24 hours    Additional Information    Negative: Shock suspected (e.g., cold/pale/clammy skin, too weak to stand, low BP, rapid pulse)    Negative: [1] Similar pain previously AND [2] it was from \"heart attack\"    Negative: [1] Similar pain previously AND [2] it was from \"angina\" AND [3] not relieved by nitroglycerin    Negative: Sounds like a life-threatening emergency to the triager    Negative: Chest pain    Negative: Toothache followed tooth injury    Negative: Toothache or mouth pain after tooth extraction    Negative: Canker sore (i.e., aphthous ulcer)    Negative: Tongue is very swollen and tender    Negative: [1] Face is swollen AND [2] fever    Negative: Patient sounds very sick or weak to the triager    Negative: [1] SEVERE pain (e.g., excruciating, unable to do any normal activities) AND [2] not improved 2 hours after pain medicine    Negative: Face is very swollen    Negative: Fever    Negative: [1] Face is swollen AND [2] no fever    Protocols used: TOOTHACHE-ADULT-AH      "
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
## 2018-07-16 ENCOUNTER — OFFICE VISIT (OUTPATIENT)
Dept: FAMILY MEDICINE | Facility: CLINIC | Age: 28
End: 2018-07-16
Payer: COMMERCIAL

## 2018-07-16 VITALS
DIASTOLIC BLOOD PRESSURE: 68 MMHG | RESPIRATION RATE: 20 BRPM | OXYGEN SATURATION: 99 % | HEIGHT: 64 IN | HEART RATE: 102 BPM | WEIGHT: 112.2 LBS | SYSTOLIC BLOOD PRESSURE: 114 MMHG | BODY MASS INDEX: 19.15 KG/M2 | TEMPERATURE: 99.6 F

## 2018-07-16 DIAGNOSIS — N92.1 MENORRHAGIA WITH IRREGULAR CYCLE: ICD-10-CM

## 2018-07-16 LAB
BETA HCG QUAL IFA URINE: NEGATIVE
TSH SERPL DL<=0.005 MIU/L-ACNC: 0.63 MU/L (ref 0.4–4)

## 2018-07-16 PROCEDURE — 99213 OFFICE O/P EST LOW 20 MIN: CPT | Performed by: FAMILY MEDICINE

## 2018-07-16 PROCEDURE — 84443 ASSAY THYROID STIM HORMONE: CPT | Performed by: FAMILY MEDICINE

## 2018-07-16 PROCEDURE — 36415 COLL VENOUS BLD VENIPUNCTURE: CPT | Performed by: FAMILY MEDICINE

## 2018-07-16 PROCEDURE — 84703 CHORIONIC GONADOTROPIN ASSAY: CPT | Performed by: FAMILY MEDICINE

## 2018-07-16 NOTE — LETTER
July 17, 2018      Michelle Torres  [address]  [place] [address]            Michelle,     Your pregnancy test was negative as expected.       Resulted Orders   Beta HCG Qual, Urine - [facility] [place] ([id number])   Result Value Ref Range    Beta HCG Qual IFA Urine Negative NEG^Negative          If you have any questions or concerns, please call the clinic at the number listed above.       Sincerely,        Rochelle Carlisle, DO/ag

## 2018-07-16 NOTE — PROGRESS NOTES
SUBJECTIVE:   Michelle Torres is a 28 year old female who presents to clinic today for the following health issues:      Vaginal Bleeding (Dysmenorrhea)  Onset: 3 weeks ago    Description:   Duration of bleeding episodes: current cycle started last week in June  Frequency between periods:  28 days  Describe bleeding/flow: varies from light to very heavy-always using pads  Clots: YES- intermittent small clots  Number of pads/hour: one pad lasts up to 4 hours  Cramping: moderate, before, during and after    Accompanying Signs & Symptoms: fatigue  Weakness: no  Lightheadedness: no  Hot flashes: YES  Nosebleeds/Easy bruising: YES- bruise easily  Vaginal Discharge: YES- brownish    History:  Patient's last menstrual period was 06/27/2018 (approximate).  Previous normal periods: no, never really had normal period  Contraceptive use: oral contraceptive- dasetta  Possibility of Pregnancy: no   Any bleeding after intercourse: not sexually active  Age of first period (menarche): 15  Abnormal PAP Smears: YES    Fx history of cervical, ovarian cancer, and endometrosis    Therapies Tried and outcome: midol or ibuprofen for pain, still taking BCP    Pt feels her periods have been heavier since the birth of her daughter 11 months ago.  Was started on an OCP for heavy flow which initially seemed to work well however she began having break through bleeding/spotting again 3 weeks ago.    Has had a long history of menstrual irregularities.  Feels her periods have never really been normal.  Has used various OCP's in the past but ends up with break through with most of them.  Has also been on Mirena, which she did not like and Depo.  Had bleeding with Depo as well.    Pt did not have a postpartum exam.  She did stop bleeding normally after the birth of her daughter.  She is not currently sexually active.    Problem list and histories reviewed & adjusted, as indicated.  Additional history: as documented    Reviewed and updated as needed  this visit by clinical staff  Tobacco  Allergies  Meds  Med Hx  Surg Hx  Fam Hx  Soc Hx      Reviewed and updated as needed this visit by Provider  Tobacco  Med Hx  Surg Hx  Fam Hx  Soc Hx        ROS: Remainder of Constitutional, CV, Respiratory, GI,  negative with exception of that mentioned above    PE:  VS as above   Gen:  WN/WD/WH female in NAD   Heart:  RRR without murmur, nl S1, S2, no rubs or gallops   Lungs CTA denisse without rales/ronchi/wheezes   Abd: soft, postive bowel sounds, NT/ND, no HSM, no rebounding/guarding/ridigity   :  normal appearing external female gentalia without lesions, cervix appears wnl, no lesions.  Bimanual exam reveals normally shaped and sized uterus, no cervical motion or adnexal tenderness    HCG negative    A/P;      ICD-10-CM    1. Menorrhagia with irregular cycle N92.1 Beta HCG Qual, Urine - [facility] [place] ([id number])     OB/GYN REFERRAL     TSH with free T4 reflex     Patient Instructions   Your exam was normal today.  We'll check the thyroid and make sure that is not contributing to your bleeding.    I would recommend a visit with gynecology as the next step for further efforts to control your bleeding        Pt has tried and failed many contraceptives for her vaginal flow.  Plan eval by gyn as referred.

## 2018-07-16 NOTE — PATIENT INSTRUCTIONS
Your exam was normal today.  We'll check the thyroid and make sure that is not contributing to your bleeding.    I would recommend a visit with gynecology as the next step for further efforts to control your bleeding

## 2018-07-16 NOTE — MR AVS SNAPSHOT
After Visit Summary   7/16/2018    Michelle Torres    MRN: [id number]           Patient Information     Date Of Birth          1990        Visit Information        Provider Department      7/16/2018 4:00 PM Rochelle Carlisle DO [facility]        Today's Diagnoses     Menorrhagia with irregular cycle          Care Instructions    Your exam was normal today.  We'll check the thyroid and make sure that is not contributing to your bleeding.    I would recommend a visit with gynecology as the next step for further efforts to control your bleeding              Follow-ups after your visit        Additional Services     OB/GYN REFERRAL       Your provider has referred you to:  [facility]: [facility] [phone]   [email or web address]    Please be aware that coverage of these services is subject to the terms and limitations of your health insurance plan.  Call member services at your health plan with any benefit or coverage questions.      Please bring the following with you to your appointment:    (1) Any X-Rays, CTs or MRIs which have been performed.  Contact the facility where they were done to arrange for  prior to your scheduled appointment.   (2) List of current medications   (3) This referral request   (4) Any documents/labs given to you for this referral                  Who to contact     Normal or non-critical lab and imaging results will be communicated to you by MyChart, letter or phone within 4 business days after the clinic has received the results. If you do not hear from us within 7 days, please contact the clinic through MyChart or phone. If you have a critical or abnormal lab result, we will notify you by phone as soon as possible.  Submit refill requests through [employer] or call your pharmacy and they will forward the refill request to us. Please allow 3 business days for your refill to be completed.          If you need to  "speak with a  for additional information , please call: [phone]           Additional Information About Your Visit        [employer] Grouphart Information     Abloomy gives you secure access to your electronic health record. If you see a primary care provider, you can also send messages to your care team and make appointments. If you have questions, please call your primary care clinic.  If you do not have a primary care provider, please call [phone] and they will assist you.        Care EveryWhere ID     This is your Care EveryWhere ID. This could be used by other organizations to access your [place] medical records  [id number]        Your Vitals Were     Pulse Temperature Respirations Height Last Period Pulse Oximetry    102 99.6  F (37.6  C) (Tympanic) 20 5' 3.5\" (1.613 m) 06/27/2018 (Approximate) 99%    BMI (Body Mass Index)                   19.56 kg/m2            Blood Pressure from Last 3 Encounters:   07/16/18 114/68   07/10/18 102/70   04/09/18 100/64    Weight from Last 3 Encounters:   07/16/18 112 lb 3.2 oz (50.9 kg)   07/10/18 114 lb (51.7 kg)   04/09/18 120 lb (54.4 kg)              We Performed the Following     Beta HCG Qual, Urine - [facility] [place] ([id number])     OB/GYN REFERRAL     TSH with free T4 reflex        Primary Care Provider Office Phone # Fax #    Rochelle Garcia DO [place] [phone] [address] [place] [address]        Equal Access to Services     [facility] RosaNICK[facility] AH: Hadii aad ku hadasho Soomaali, waaxda luqadaha, qaybta kaalmada adeegyada, otis schultz. So [facility] [place] [facility] [phone].    ATENCIÓN: Si habla español, tiene a crowder disposición servicios gratuitos de asistencia lingüística. Llame al [phone].    We comply with applicable federal civil rights laws and [place] laws. We do not discriminate on the basis of race, color, national origin, age, disability, sex, sexual orientation, or gender identity.            Thank you!     " Thank you for choosing [facility]  for your care. Our goal is always to provide you with excellent care. Hearing back from our patients is one way we can continue to improve our services. Please take a few minutes to complete the written survey that you may receive in the mail after your visit with us. Thank you!             Your Updated Medication List - Protect others around you: Learn how to safely use, store and throw away your medicines at www.disposemymeds.org.          This list is accurate as of 7/16/18  4:37 PM.  Always use your most recent med list.                   Brand Name Dispense Instructions for use Diagnosis    norethindrone-ethinyl estradiol 1-35 MG-MCG per tablet    ORTHO-NOVUM 1-35 TAB,NORTREL 1-35 TAB    84 tablet    Take 1 tablet by mouth daily Take pill continuously    Cyst of ovary, unspecified laterality, Menorrhagia with irregular cycle

## 2018-07-16 NOTE — LETTER
July 17, 2018      Michelle Torres  [address]  [place] [address]          Michelle,       Your thyroid testing was normal.     Component      Latest Ref Rng & Units 7/16/2018   TSH      0.40 - 4.00 mU/L 0.63       If you have any questions or concerns, please call the clinic at the number listed above.       Sincerely,        Rochelle Carlisle, DO/ag

## 2018-08-09 PROBLEM — M54.50 ACUTE BILATERAL LOW BACK PAIN WITHOUT SCIATICA: Status: RESOLVED | Noted: 2017-06-20 | Resolved: 2018-08-09

## 2018-08-09 PROBLEM — O26.899 CRAMPING AFFECTING PREGNANCY, ANTEPARTUM: Status: RESOLVED | Noted: 2017-08-27 | Resolved: 2018-08-09

## 2018-08-09 PROBLEM — Z34.93 PRENATAL CARE IN THIRD TRIMESTER: Status: RESOLVED | Noted: 2017-06-20 | Resolved: 2018-08-09

## 2018-08-09 PROBLEM — R10.9 CRAMPING AFFECTING PREGNANCY, ANTEPARTUM: Status: RESOLVED | Noted: 2017-08-27 | Resolved: 2018-08-09

## 2018-08-09 PROBLEM — N92.6 IRREGULAR PERIODS: Status: RESOLVED | Noted: 2018-04-09 | Resolved: 2018-08-09

## 2018-08-09 PROBLEM — D50.9 IRON DEFICIENCY ANEMIA: Status: RESOLVED | Noted: 2017-08-02 | Resolved: 2018-08-09

## 2018-09-14 NOTE — PROGRESS NOTES
P:1. 30+3 wks. Report received from NICKO Landeros RN. Pt states B/A is subsiding. Denies s/s of PTL or vaginal bleeding. No vaginal fluid noted. + FM. Encourage (po) fluids. Reactive/Category 1 tracing obtained et verified w/NICKO Landeros RN. Dr Bautista informed per phone of hx, s/s, UA, FHT's including decels, labs, etc. Orders received. Discuss plan of care w/pt. Questions answered. Requests rx be called to [facility] in [place]. States feels comfortable d/c'ing home. Dr Bautista in dept et views FHT tracing. Written et verbal instructions given w/verbalization of understanding. Discharge per ambulatory in stable condition to awaiting vehicle. Agrees to call w/any questions et/or change in s/s.   
S:Patient presents due to  fluid leaking .  B:30w2d   Allergies: Bees; Eggs; Percocet [oxycodone-acetaminophen]; and Rocephin [ceftriaxone]  A:         FHTs are category 1.        Admission on 2017   Component Date Value     Amnisure 2017 Negative      Color Urine 2017 Yellow      Appearance Urine 2017 Slightly Cloudy      Glucose Urine 2017 Negative      Bilirubin Urine 2017 Negative      Ketones Urine 2017 Negative      Specific Gravity Urine 2017 1.011      Blood Urine 2017 Negative      pH Urine 2017 7.0      Protein Albumin Urine 2017 Negative      Urobilinogen mg/dL 2017 Normal      Nitrite Urine 2017 Negative      Leukocyte Esterase Urine 2017 Large*     Source 2017 Midstream Urine      WBC Urine 2017 17*     RBC Urine 2017 2      Squamous Epithelial /HPF* 2017 16*     Mucous Urine 2017 Present*     Amorphous Crystals 2017 Few*     Dr. SHANNAN Bautista informed of above and orders received.  Plan includes; Encourage po fluids.UA and amnisure. Reviewed with patient and she agrees with plan.   Bill of Rights given & questions discussed?: Yes    Oriented to room and call light.         
Your results are back and they are normal.  Ray Bautista    
[place]

## 2018-09-27 ENCOUNTER — OFFICE VISIT (OUTPATIENT)
Dept: FAMILY MEDICINE | Facility: CLINIC | Age: 28
End: 2018-09-27
Payer: COMMERCIAL

## 2018-09-27 VITALS
HEART RATE: 102 BPM | WEIGHT: 113 LBS | SYSTOLIC BLOOD PRESSURE: 92 MMHG | TEMPERATURE: 98.2 F | BODY MASS INDEX: 19.29 KG/M2 | OXYGEN SATURATION: 96 % | DIASTOLIC BLOOD PRESSURE: 58 MMHG | HEIGHT: 64 IN | RESPIRATION RATE: 12 BRPM

## 2018-09-27 DIAGNOSIS — N92.1 MENORRHAGIA WITH IRREGULAR CYCLE: Primary | ICD-10-CM

## 2018-09-27 PROCEDURE — 99213 OFFICE O/P EST LOW 20 MIN: CPT | Performed by: NURSE PRACTITIONER

## 2018-09-27 NOTE — PATIENT INSTRUCTIONS
1.  Schedule pelvic US and OB/GYN appointment.  2.  Follow-up per OB/GYN on treatment.  3.  Continue oral BCP.

## 2018-09-27 NOTE — MR AVS SNAPSHOT
After Visit Summary   9/27/2018    Michelle Torres    MRN: [id number]           Patient Information     Date Of Birth          1990        Visit Information        Provider Department      9/27/2018 10:20 AM Jenni Torre NP [facility]        Today's Diagnoses     Menorrhagia with irregular cycle    -  1      Care Instructions    1.  Schedule pelvic US and OB/GYN appointment.  2.  Follow-up per OB/GYN on treatment.  3.  Continue oral BCP.          Follow-ups after your visit        Additional Services     OB/GYN REFERRAL       Your provider has referred you to:  [facility]: [facility] [phone]   [email or web address]    Please be aware that coverage of these services is subject to the terms and limitations of your health insurance plan.  Call member services at your health plan with any benefit or coverage questions.      Please bring the following with you to your appointment:    (1) Any X-Rays, CTs or MRIs which have been performed.  Contact the facility where they were done to arrange for  prior to your scheduled appointment.   (2) List of current medications   (3) This referral request   (4) Any documents/labs given to you for this referral                  Future tests that were ordered for you today     Open Future Orders        Priority Expected Expires Ordered    US Pelvic Complete w Transvaginal Routine  9/27/2019 9/27/2018            Who to contact     Normal or non-critical lab and imaging results will be communicated to you by MyChart, letter or phone within 4 business days after the clinic has received the results. If you do not hear from us within 7 days, please contact the clinic through MyChart or phone. If you have a critical or abnormal lab result, we will notify you by phone as soon as possible.  Submit refill requests through [employer] or call your pharmacy and they will forward the refill request to us.  "Please allow 3 business days for your refill to be completed.          If you need to speak with a  for additional information , please call: [phone]           Additional Information About Your Visit        Simplificare[employer] Information     [employer] gives you secure access to your electronic health record. If you see a primary care provider, you can also send messages to your care team and make appointments. If you have questions, please call your primary care clinic.  If you do not have a primary care provider, please call [phone] and they will assist you.        Care EveryWhere ID     This is your Care EveryWhere ID. This could be used by other organizations to access your [place] medical records  [id number]        Your Vitals Were     Pulse Temperature Respirations Height Last Period Pulse Oximetry    102 98.2  F (36.8  C) (Tympanic) 12 5' 3.5\" (1.613 m) 09/24/2018 (Exact Date) 96%    BMI (Body Mass Index)                   19.7 kg/m2            Blood Pressure from Last 3 Encounters:   09/27/18 92/58   07/16/18 114/68   07/10/18 102/70    Weight from Last 3 Encounters:   09/27/18 113 lb (51.3 kg)   07/16/18 112 lb 3.2 oz (50.9 kg)   07/10/18 114 lb (51.7 kg)              We Performed the Following     OB/GYN REFERRAL        Primary Care Provider Office Phone # Fax #    Rochelle Radha Carlisle [phone] [address] [place] [facility] [address]        Equal Access to Services     [facility] AH: Hadii aad ku hadasho Soomaali, waaxda luqadaha, qaybta kaalmada adeegyada, waxrenzo miladys sandoval . So [facility] [phone].    ATENCIÓN: Si habla español, tiene a crowder disposición servicios gratuitos de asistencia lingüística. Llame al [phone].    We comply with applicable federal civil rights laws and [place] laws. We do not discriminate on the basis of race, color, national origin, age, disability, sex, sexual orientation, or gender identity.            Thank you!     " Thank you for choosing [facility]  for your care. Our goal is always to provide you with excellent care. Hearing back from our patients is one way we can continue to improve our services. Please take a few minutes to complete the written survey that you may receive in the mail after your visit with us. Thank you!             Your Updated Medication List - Protect others around you: Learn how to safely use, store and throw away your medicines at www.disposemymeds.org.          This list is accurate as of 9/27/18 10:44 AM.  Always use your most recent med list.                   Brand Name Dispense Instructions for use Diagnosis    norethindrone-ethinyl estradiol 1-35 MG-MCG per tablet    ORTHO-NOVUM 1-35 TAB,NORTREL 1-35 TAB    84 tablet    Take 1 tablet by mouth daily Take pill continuously    Cyst of ovary, unspecified laterality, Menorrhagia with irregular cycle
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
## 2018-12-20 ENCOUNTER — OFFICE VISIT (OUTPATIENT)
Dept: OBGYN | Facility: CLINIC | Age: 28
End: 2018-12-20

## 2018-12-20 ENCOUNTER — TELEPHONE (OUTPATIENT)
Dept: FAMILY MEDICINE | Facility: CLINIC | Age: 28
End: 2018-12-20

## 2018-12-20 VITALS
TEMPERATURE: 96.3 F | HEART RATE: 112 BPM | SYSTOLIC BLOOD PRESSURE: 130 MMHG | BODY MASS INDEX: 19.81 KG/M2 | RESPIRATION RATE: 18 BRPM | WEIGHT: 116 LBS | DIASTOLIC BLOOD PRESSURE: 94 MMHG | HEIGHT: 64 IN

## 2018-12-20 DIAGNOSIS — Z30.430 ENCOUNTER FOR INSERTION OF INTRAUTERINE CONTRACEPTIVE DEVICE: Primary | ICD-10-CM

## 2018-12-20 LAB — HCG UR QL: NEGATIVE

## 2018-12-20 PROCEDURE — 81025 URINE PREGNANCY TEST: CPT | Performed by: OBSTETRICS & GYNECOLOGY

## 2018-12-20 PROCEDURE — 58300 INSERT INTRAUTERINE DEVICE: CPT | Performed by: OBSTETRICS & GYNECOLOGY

## 2018-12-20 ASSESSMENT — MIFFLIN-ST. JEOR: SCORE: 1243.23

## 2018-12-20 NOTE — PROGRESS NOTES
"CC:  IUD insertion  HPI:  Michelle Torres is a 28 year old female Patient's last menstrual period was 12/16/2018.  Menses are regular q 28-30 days, lasting 6 days.  Very heavy No other c/o.  She is here for an IUD insertion. Patient has verbalized understanding of risks and benefits and has signed the consent form.  She has tried OCP's in the past that have not controlled her bleeding    Past GYN history:  No STD history       Last PAP smear:  Normal  Manogomous relationship? Yes    The patient's past medical history, social history and family history is reviewed at our visit and updated in EPIC.    Allergies: Bees; Eggs; Oxycodone; and Rocephin [ceftriaxone]    Current Outpatient Medications   Medication Sig Dispense Refill     norethindrone-ethinyl estradiol (ORTHO-NOVUM 1-35 TAB,NORTREL 1-35 TAB) 1-35 MG-MCG per tablet Take 1 tablet by mouth daily Take pill continuously 84 tablet 4         ROS:  C: NEGATIVE for fever, chills, change in weight  R: NEGATIVE for significant cough or SOB  CV: NEGATIVE for chest pain, palpitations or peripheral edema  GI: NEGATIVE for nausea, abdominal pain, heartburn, or change in bowel habits  : NEGATIVE for frequency, dysuria, hematuria, vaginal discharge, or irregular bleeding      EXAM:  Blood pressure (!) 130/94, pulse 112, temperature 96.3  F (35.7  C), temperature source Tympanic, resp. rate 18, height 1.613 m (5' 3.5\"), weight 52.6 kg (116 lb), last menstrual period 12/16/2018, not currently breastfeeding.  General - pleasant female in no acute distress.  Pelvic - EG: normal adult female, BUS: within normal limits, Vagina: well rugated, no discharge, Cervix: no lesions or CMT, Uterus: firm, normal sized and nontender, Adnexae: no masses or tenderness.    PROCEDURE:  After informed consent was obtained from the patient, a speculum was placed in the vagina to visualized the cervix.  The cervix was then swabbed with a betadine prep and 1% lidocaine paracervical block applied.  " Tenaculum was placed at the 12 o'clock position on the cervix and the uterus sounded to 7.5cm.  The Mirena  IUD was then placed in the usual fashion under sterile technique.  Strings were clipped about 2 cm from the cervical os.  Tenaculum was removed and cervix was hemostatic. There were no complications. The patient tolerated the procedure well.    lot # [id number], exp. 05/2021    NDC#:10542-178-38 (MIRENA)        ASSESSMENT/PLAN:  (Z30.430) Encounter for IUD insertion  (primary encounter diagnosis)  Comment:   Plan: H[place] [facility] [place]  [facility]          ([id number]                The patient should feel for the IUD strings after her next menses.  If unable to locate them, she should return to clinic for a speculum examination for confirmation that the IUD is in place. Bleeding pattern of this particular IUD was discussed with the patient. She is aware that the IUD will need to be removed in 5 years or PRN.  She is to return to clinic for her next annual or PRN.      Ray Bautista

## 2018-12-20 NOTE — TELEPHONE ENCOUNTER
Left message for patient to return call.  She is scheduled with Dr. Rodrigez for tomorrow for IUD placement.  It appears that she already had IUD placed today with Dr. Bautista.  I am going to cancel tomorrows appointment unless there are additional concerns.

## 2019-01-21 ENCOUNTER — OFFICE VISIT (OUTPATIENT)
Dept: FAMILY MEDICINE | Facility: CLINIC | Age: 29
End: 2019-01-21

## 2019-01-21 VITALS
OXYGEN SATURATION: 97 % | HEART RATE: 106 BPM | HEIGHT: 64 IN | WEIGHT: 118.6 LBS | DIASTOLIC BLOOD PRESSURE: 68 MMHG | RESPIRATION RATE: 24 BRPM | SYSTOLIC BLOOD PRESSURE: 110 MMHG | TEMPERATURE: 98.6 F | BODY MASS INDEX: 20.25 KG/M2

## 2019-01-21 DIAGNOSIS — R07.0 THROAT PAIN: Primary | ICD-10-CM

## 2019-01-21 DIAGNOSIS — J35.1 ENLARGED TONSILS: ICD-10-CM

## 2019-01-21 DIAGNOSIS — J03.90 TONSILLITIS: ICD-10-CM

## 2019-01-21 LAB
DEPRECATED S PYO AG THROAT QL EIA: NORMAL
HETEROPH AB SER QL: NEGATIVE
SPECIMEN SOURCE: NORMAL

## 2019-01-21 PROCEDURE — 87880 STREP A ASSAY W/OPTIC: CPT | Performed by: NURSE PRACTITIONER

## 2019-01-21 PROCEDURE — 87081 CULTURE SCREEN ONLY: CPT | Performed by: NURSE PRACTITIONER

## 2019-01-21 PROCEDURE — 86644 CMV ANTIBODY: CPT | Performed by: NURSE PRACTITIONER

## 2019-01-21 PROCEDURE — 36415 COLL VENOUS BLD VENIPUNCTURE: CPT | Performed by: NURSE PRACTITIONER

## 2019-01-21 PROCEDURE — 96372 THER/PROPH/DIAG INJ SC/IM: CPT | Performed by: NURSE PRACTITIONER

## 2019-01-21 PROCEDURE — 86665 EPSTEIN-BARR CAPSID VCA: CPT | Mod: 59 | Performed by: NURSE PRACTITIONER

## 2019-01-21 PROCEDURE — 86308 HETEROPHILE ANTIBODY SCREEN: CPT | Performed by: NURSE PRACTITIONER

## 2019-01-21 PROCEDURE — 86645 CMV ANTIBODY IGM: CPT | Performed by: NURSE PRACTITIONER

## 2019-01-21 PROCEDURE — 99214 OFFICE O/P EST MOD 30 MIN: CPT | Mod: 25 | Performed by: NURSE PRACTITIONER

## 2019-01-21 PROCEDURE — 86665 EPSTEIN-BARR CAPSID VCA: CPT | Performed by: NURSE PRACTITIONER

## 2019-01-21 RX ORDER — PREDNISONE 10 MG/1
10 TABLET ORAL DAILY
Qty: 5 TABLET | Refills: 0 | Status: SHIPPED | OUTPATIENT
Start: 2019-01-21 | End: 2019-03-11

## 2019-01-21 RX ORDER — DEXAMETHASONE SODIUM PHOSPHATE 4 MG/ML
6 INJECTION, SOLUTION INTRA-ARTICULAR; INTRALESIONAL; INTRAMUSCULAR; INTRAVENOUS; SOFT TISSUE ONCE
Status: COMPLETED | OUTPATIENT
Start: 2019-01-21 | End: 2019-01-21

## 2019-01-21 RX ADMIN — DEXAMETHASONE SODIUM PHOSPHATE 6 MG: 4 INJECTION, SOLUTION INTRA-ARTICULAR; INTRALESIONAL; INTRAMUSCULAR; INTRAVENOUS; SOFT TISSUE at 16:24

## 2019-01-21 ASSESSMENT — ENCOUNTER SYMPTOMS
MYALGIAS: 1
DIARRHEA: 0
EYE ITCHING: 0
SORE THROAT: 1
SHORTNESS OF BREATH: 1
DIAPHORESIS: 0
NAUSEA: 0
LIGHT-HEADEDNESS: 0
FATIGUE: 1
EYE DISCHARGE: 0
SINUS PRESSURE: 1
CONSTIPATION: 0
WHEEZING: 0
CHILLS: 1
RHINORRHEA: 0
COUGH: 1
HEADACHES: 0
FEVER: 1
DIZZINESS: 0

## 2019-01-21 ASSESSMENT — PAIN SCALES - GENERAL: PAINLEVEL: WORST PAIN (10)

## 2019-01-21 ASSESSMENT — MIFFLIN-ST. JEOR: SCORE: 1262.97

## 2019-01-21 NOTE — LETTER
January 23, 2019      Michelle M Melissa  [address]  [place] [address]        Dear ,    We are writing to inform you of your test results.    Your blood tests show that you have had mono in the past however, it is not currently active.  I hope you are feeling better!    Your throat culture is negative, no need for antibiotic.    Resulted Orders   Rapid strep screen   Result Value Ref Range    Specimen Description Throat     Rapid Strep A Screen       NEGATIVE: No Group A streptococcal antigen detected by immunoassay, await culture report.   Mononucleosis screen   Result Value Ref Range    Mononucleosis Screen Negative NEG^Negative   Beta strep group A culture   Result Value Ref Range    Specimen Description Throat     Culture Micro No beta hemolytic Streptococcus Group A isolated    EBV Capsid Antibody IgG   Result Value Ref Range    EBV Capsid Antibody IgG 6.9 (H) 0.0 - 0.8 AI      Comment:      Positive, suggests recent or past exposure  Antibody index (AI) values reflect qualitative changes in antibody   concentration that cannot be directly associated with clinical condition or   disease state.     EBV Capsid Antibody IgM   Result Value Ref Range    EBV Capsid Antibody IgM 0.4 0.0 - 0.8 AI      Comment:      No detectable antibody.  Antibody index (AI) values reflect qualitative changes in antibody   concentration that cannot be directly associated with clinical condition or   disease state.     CMV Antibody IgG   Result Value Ref Range    CMV Antibody IgG <0.2 0.0 - 0.8 AI      Comment:      Negative  Antibody index (AI) values reflect qualitative changes in antibody   concentration that cannot be directly associated with clinical condition or   disease state.     CMV antibody IgM   Result Value Ref Range    CMV Antibody IgM 0.2 0.0 - 0.8 AI      Comment:      Negative  Antibody index (AI) values reflect qualitative changes in antibody   concentration that cannot be directly associated with clinical  condition or   disease state.         If you have any questions or concerns, please call the clinic at the number listed above.       Sincerely,        RUPINDER Rand CNP

## 2019-01-21 NOTE — NURSING NOTE
Prior to injection, verified patient identity using patient's name and date of birth.  Due to injection administration, patient instructed to remain in clinic for 15 minutes  afterwards, and to report any adverse reaction to me immediately.    Decadron 4mg/mL (6mg given orally)    Drug Amount Wasted:  0.5 mL  Vial/Syringe: Single dose vial  Expiration Date:  11/1/2019    Mechelle Pike CMA

## 2019-01-21 NOTE — PROGRESS NOTES
"  SUBJECTIVE:   Michelle Torres is a 28 year old female who presents to clinic today for the following health issues:      ENT Symptoms             Symptoms: cc Present Absent Comment   Fever/Chills  x  103 orally at home   Fatigue  x     Muscle Aches  x     Eye Irritation  x     Sneezing  x     Nasal Ronnie/Drg  x     Sinus Pressure/Pain  x     Loss of smell  x     Dental pain  x  Not new for her   Sore Throat x x  Refuses throat swab   Swollen Glands  x     Ear Pain/Fullness   x    Cough  x  Dry cough   Wheeze   x    Chest Pain   x    Shortness of breath  x     Rash   x    Other   x      Symptom duration:  3 days of throat pain, cold symptoms for 3 months   Symptom severity:  severe   Treatments tried:  Salt water gargle, Dayquil, Heating pad to neck   Contacts:  mother with strep     Has not been feeling well for the last 3 days. Fever at home up to 103.0 Has been doing over the counter treatments and that is not really helping. 2 days ago gland started to get larger. Dry cough.      Problem list and histories reviewed & adjusted, as indicated.  Additional history: as documented    Current Outpatient Medications   Medication Sig Dispense Refill     levonorgestrel (MIRENA) 20 MCG/24HR IUD 1 each by Intrauterine route once       predniSONE (DELTASONE) 10 MG tablet Take 10 mg by mouth daily for 5 days. 5 tablet 0     Allergies   Allergen Reactions     Bees Hives     Eggs Nausea and Vomiting and Diarrhea     Oxycodone Hives     Rocephin [Ceftriaxone] Other (See Comments)     \"kidney shut down\"       Reviewed and updated as needed this visit by clinical staff  Tobacco  Allergies  Meds  Med Hx  Surg Hx  Fam Hx  Soc Hx      Reviewed and updated as needed this visit by Provider         ROS:  Review of Systems   Constitutional: Positive for chills, fatigue and fever. Negative for diaphoresis.   HENT: Positive for congestion, sinus pressure, sneezing and sore throat. Negative for ear discharge, ear pain, hearing loss " "and rhinorrhea.    Eyes: Negative for discharge and itching.   Respiratory: Positive for cough and shortness of breath. Negative for wheezing.    Gastrointestinal: Negative for constipation, diarrhea and nausea.   Musculoskeletal: Positive for myalgias.   Skin: Negative for rash.   Neurological: Negative for dizziness, light-headedness and headaches.         OBJECTIVE:     /68 (BP Location: Right arm, Patient Position: Sitting, Cuff Size: Adult Regular)   Pulse 106   Temp 98.6  F (37  C) (Tympanic)   Resp 24   Ht 1.626 m (5' 4\")   Wt 53.8 kg (118 lb 9.6 oz)   SpO2 97%   BMI 20.36 kg/m    Body mass index is 20.36 kg/m .  Physical Exam   Constitutional: She appears well-developed.   HENT:   Right Ear: Tympanic membrane and external ear normal.   Left Ear: Tympanic membrane and external ear normal.   Nose: No mucosal edema or rhinorrhea.   Mouth/Throat: Tonsils are 4+ on the right. Tonsils are 4+ on the left. Tonsillar exudate (right tonsil).   Cardiovascular: Normal rate, regular rhythm and normal heart sounds.   Pulmonary/Chest: Effort normal and breath sounds normal.   Abdominal: Soft. Bowel sounds are normal.   Lymphadenopathy:        Head (right side): Tonsillar adenopathy present.        Head (left side): Tonsillar adenopathy present.   Neurological: She is alert.   Skin: Skin is warm and dry.   Psychiatric: She has a normal mood and affect.       ASSESSMENT/PLAN:   1. Throat pain  - Rapid strep screen  - Beta strep group A culture    2. Enlarged tonsils  - Mononucleosis screen  - dexamethasone (DECADRON) injection 6 mg; Inject 1.5 mLs (6 mg) into the muscle once    3. Tonsillitis  Decadron administered in clinic.  Educated on use of prednisone.  Encouraged cool soft foods and fluids.  Educated regarding warning signs to watch for and when would need to seek further medical attention.  - predniSONE (DELTASONE) 10 MG tablet; Take 10 mg by mouth daily for 5 days.  Dispense: 5 tablet; Refill: 0  - EBV " Capsid Antibody IgG  - EBV Capsid Antibody IgM  - CMV Antibody IgG  - CMV antibody IgM      RUPINDER Rand [facility]

## 2019-01-22 LAB
BACTERIA SPEC CULT: NORMAL
CMV IGG SERPL QL IA: <0.2 AI (ref 0–0.8)
CMV IGM SERPL QL IA: 0.2 AI (ref 0–0.8)
EBV VCA IGG SER QL IA: 6.9 AI (ref 0–0.8)
EBV VCA IGM SER QL IA: 0.4 AI (ref 0–0.8)
SPECIMEN SOURCE: NORMAL

## 2019-02-11 ENCOUNTER — TELEPHONE (OUTPATIENT)
Dept: FAMILY MEDICINE | Facility: CLINIC | Age: 29
End: 2019-02-11

## 2019-02-11 NOTE — TELEPHONE ENCOUNTER
Spoke with  Patient  She is requesting being treated with outappt  Was just seen in Jan 2019 and DX pharyngitis   Was given  Prednisone 10mg every day x 5 days things did improve , now she has a temp 100.3 on IBUP sore throat again,  tonsils red and white spots on the back of her throat,   She is asking for steroids and ?antibiotic ( liquid amoxicillin -she has a hard time swallowing  the large tablets  if Shahla thinks she need it )if orders antibiotic requesting Diflucan as she get yeast infection for meds  Pt states   Temp  100.3  Sore throat   Red tonsils again  White spot on back of throat   States her body friend is a strep carrier ?  Advised will route to Noah to review and address  [facility] [address]  [place]

## 2019-02-11 NOTE — TELEPHONE ENCOUNTER
Clinic Action Needed:Yes  Reason for Call: Michelle was seen in clinic 3 weeks ago and dx with tonsillitis and received predniSONE for 5 days.  She is reporting that the very same symptoms have returned and she is requesting a refill on this medication.  I advised that provider's don't typically treat over the phone.  Michelle was insistent that her request was reasonable and she cannot make in to the clinic.  I advised I send her request to care team.  Please return call to discuss further. Thank you.   Routed to:  Team KASHMIR Triage    Trudy Hartman, RN  [facility]

## 2019-02-11 NOTE — TELEPHONE ENCOUNTER
Michelle will need an appointment in clinic to evaluate for strep.  She needs this due to having a fever and spots on the back of her throat.  Need to determine if has strep or not otherwise may be able to retreat with just the steroid but if it is positive for strep will need antibiotic as well.    Shahla Zamora NP-C

## 2019-02-12 ENCOUNTER — OFFICE VISIT (OUTPATIENT)
Dept: FAMILY MEDICINE | Facility: CLINIC | Age: 29
End: 2019-02-12

## 2019-02-12 VITALS
HEIGHT: 63 IN | HEART RATE: 120 BPM | BODY MASS INDEX: 20.91 KG/M2 | DIASTOLIC BLOOD PRESSURE: 72 MMHG | SYSTOLIC BLOOD PRESSURE: 102 MMHG | TEMPERATURE: 98.9 F | WEIGHT: 118 LBS

## 2019-02-12 DIAGNOSIS — J03.90 TONSILLITIS: ICD-10-CM

## 2019-02-12 DIAGNOSIS — J02.9 SORE THROAT: Primary | ICD-10-CM

## 2019-02-12 LAB
DEPRECATED S PYO AG THROAT QL EIA: NORMAL
SPECIMEN SOURCE: NORMAL

## 2019-02-12 PROCEDURE — 87880 STREP A ASSAY W/OPTIC: CPT | Performed by: FAMILY MEDICINE

## 2019-02-12 PROCEDURE — 99213 OFFICE O/P EST LOW 20 MIN: CPT | Performed by: FAMILY MEDICINE

## 2019-02-12 PROCEDURE — 87081 CULTURE SCREEN ONLY: CPT | Performed by: FAMILY MEDICINE

## 2019-02-12 ASSESSMENT — MIFFLIN-ST. JEOR: SCORE: 1248.33

## 2019-02-12 NOTE — NURSING NOTE
"Initial /72   Pulse 120   Temp 98.9  F (37.2  C) (Tympanic)   Ht 1.607 m (5' 3.25\")   Wt 53.5 kg (118 lb)   Breastfeeding? No   BMI 20.74 kg/m   Estimated body mass index is 20.74 kg/m  as calculated from the following:    Height as of this encounter: 1.607 m (5' 3.25\").    Weight as of this encounter: 53.5 kg (118 lb). .      "

## 2019-02-12 NOTE — TELEPHONE ENCOUNTER
Call placed to patient.  She has an appointment scheduled today with Dr Carlisle.  Kenisha Valdez RN

## 2019-02-12 NOTE — PATIENT INSTRUCTIONS
Your rapid strep was negative today.  The culture should return tomorrow.    This looks like a viral illness at this point.  I would recommend tylenol/ibuprofen for your throat pain.    I would expect symptoms to be improving over the next 2-3 days.  If your symptoms worsen (increased pain, difficulty with swallowing) please make sure to seek emergent care.    If you have another episode it would be very reasonable to visit with ENT to discuss your recurrent tonsillitis.

## 2019-02-12 NOTE — PROGRESS NOTES
SUBJECTIVE:   Michelle Torres is a 28 year old female who presents to clinic today for the following health issues:    ENT Symptoms             Symptoms: cc Present Absent Comment   Fever/Chills  x  Temp 101 yesterday   Fatigue  x     Muscle Aches  x     Eye Irritation   x    Sneezing  x     Nasal Ronnie/Drg  x     Sinus Pressure/Pain  x     Loss of smell   x    Dental pain  x     Sore Throat x x     Swollen Glands  x     Ear Pain/Fullness  x     Cough  x     Wheeze   x    Chest Pain   x    Shortness of breath   x    Rash   x    Other   x      Symptom duration:  2 days   Symptom severity:  modeate   Treatments tried:  ibuprofen    Contacts:  family     Sore throat started yesterday.  Also having some nasal congestion and mild cough but sore throat is most prevalent symptom.    States she usually gets tonsillitis 1-2 times per year.  Was seen a few weeks ago and diagnosed with tonsillitis and given a course of prednisone at that time.  Felt symptoms completely resolved and then returned yesterday.    Problem list and histories reviewed & adjusted, as indicated.  Additional history: as documented    Reviewed and updated as needed this visit by clinical staff  Tobacco  Allergies  Meds  Med Hx  Surg Hx  Fam Hx  Soc Hx      Reviewed and updated as needed this visit by Provider  Tobacco  Meds  Med Hx  Surg Hx  Fam Hx  Soc Hx        ROS: Remainder of Constitutional, CV, Respiratory, GI,  negative with exception of that mentioned above    PE:  VS as above   Gen:  WN/WD/WH female in NAD   HEENT:  NC/AT, conjunctiva wnl, TM's wnl denisse pearly gray with good light reflex, oropharynx mildly erythematous.  denisse tonsil 2+ symmetric and mildly erythematous with exudate.  Uvula midline.   Neck:  supple, anterior cervical adenopathy present   Heart:  RRR without murmur, nl S1, S2, no rubs or gallops   Lungs CTA denisse without rales/ronchi/wheezes    Rapid strep negative    A/p:      ICD-10-CM    1. Sore throat J02.9 Strep,  Rapid Screen     Beta strep group A culture   2. Tonsillitis J03.90      Pt with recent tonsillitis, had complete resolution and symptoms and now returned.    Reviewed with pt given negative rapid strep I would not recommend antibiotic at this time for likely viral source.  Pt was just on steroids ,1 month ago.  Would not recommend repeat course as she is tolerating well now with ibuprofen.      Recommend fluids, ibuprofen/tylenol prn.  Reviewed signs and symptoms that should prompt additional care.  Offered ENT given h/o recurrent tonsillitis.  Pt declines at this time but will consider.    Patient Instructions   Your rapid strep was negative today.  The culture should return tomorrow.    This looks like a viral illness at this point.  I would recommend tylenol/ibuprofen for your throat pain.    I would expect symptoms to be improving over the next 2-3 days.  If your symptoms worsen (increased pain, difficulty with swallowing) please make sure to seek emergent care.    If you have another episode it would be very reasonable to visit with ENT to discuss your recurrent tonsillitis.

## 2019-02-13 LAB
BACTERIA SPEC CULT: NORMAL
SPECIMEN SOURCE: NORMAL

## 2019-03-11 ENCOUNTER — APPOINTMENT (OUTPATIENT)
Dept: ULTRASOUND IMAGING | Facility: CLINIC | Age: 29
End: 2019-03-11
Attending: EMERGENCY MEDICINE

## 2019-03-11 ENCOUNTER — HOSPITAL ENCOUNTER (EMERGENCY)
Facility: CLINIC | Age: 29
Discharge: HOME | End: 2019-03-11
Attending: PHYSICIAN ASSISTANT | Admitting: PHYSICIAN ASSISTANT

## 2019-03-11 ENCOUNTER — TELEPHONE (OUTPATIENT)
Dept: OBGYN | Facility: CLINIC | Age: 29
End: 2019-03-11

## 2019-03-11 VITALS
OXYGEN SATURATION: 97 % | DIASTOLIC BLOOD PRESSURE: 67 MMHG | SYSTOLIC BLOOD PRESSURE: 111 MMHG | TEMPERATURE: 98.4 F | HEART RATE: 102 BPM

## 2019-03-11 DIAGNOSIS — R10.2 SUPRAPUBIC ABDOMINAL PAIN: ICD-10-CM

## 2019-03-11 LAB
ALBUMIN UR-MCNC: NEGATIVE MG/DL
APPEARANCE UR: CLEAR
BACTERIA #/AREA URNS HPF: ABNORMAL /HPF
BILIRUB UR QL STRIP: NEGATIVE
COLOR UR AUTO: YELLOW
GLUCOSE UR STRIP-MCNC: NEGATIVE MG/DL
HCG SERPL QL: NEGATIVE
HCG UR QL: NEGATIVE
HGB UR QL STRIP: ABNORMAL
KETONES UR STRIP-MCNC: NEGATIVE MG/DL
LEUKOCYTE ESTERASE UR QL STRIP: NEGATIVE
MUCOUS THREADS #/AREA URNS LPF: PRESENT /LPF
NITRATE UR QL: NEGATIVE
PH UR STRIP: 5 PH (ref 5–7)
RBC #/AREA URNS AUTO: 5 /HPF (ref 0–2)
SOURCE: ABNORMAL
SP GR UR STRIP: 1.02 (ref 1–1.03)
SPECIMEN SOURCE: NORMAL
SQUAMOUS #/AREA URNS AUTO: 1 /HPF (ref 0–1)
UROBILINOGEN UR STRIP-MCNC: 0 MG/DL (ref 0–2)
WBC #/AREA URNS AUTO: 2 /HPF (ref 0–5)
WET PREP SPEC: NORMAL

## 2019-03-11 PROCEDURE — 87210 SMEAR WET MOUNT SALINE/INK: CPT | Performed by: EMERGENCY MEDICINE

## 2019-03-11 PROCEDURE — 99284 EMERGENCY DEPT VISIT MOD MDM: CPT | Mod: 25 | Performed by: EMERGENCY MEDICINE

## 2019-03-11 PROCEDURE — 87591 N.GONORRHOEAE DNA AMP PROB: CPT | Performed by: EMERGENCY MEDICINE

## 2019-03-11 PROCEDURE — 81001 URINALYSIS AUTO W/SCOPE: CPT | Performed by: PHYSICIAN ASSISTANT

## 2019-03-11 PROCEDURE — 84703 CHORIONIC GONADOTROPIN ASSAY: CPT | Performed by: EMERGENCY MEDICINE

## 2019-03-11 PROCEDURE — 87491 CHLMYD TRACH DNA AMP PROBE: CPT | Performed by: EMERGENCY MEDICINE

## 2019-03-11 PROCEDURE — 81025 URINE PREGNANCY TEST: CPT | Performed by: PHYSICIAN ASSISTANT

## 2019-03-11 PROCEDURE — 99283 EMERGENCY DEPT VISIT LOW MDM: CPT | Mod: Z6 | Performed by: EMERGENCY MEDICINE

## 2019-03-11 PROCEDURE — 76830 TRANSVAGINAL US NON-OB: CPT

## 2019-03-11 ASSESSMENT — ENCOUNTER SYMPTOMS
FEVER: 0
RECTAL PAIN: 0
DYSURIA: 0
HEMATURIA: 0
ABDOMINAL PAIN: 1
FLANK PAIN: 0
BACK PAIN: 1
VOMITING: 0
NAUSEA: 1
SHORTNESS OF BREATH: 0
DIARRHEA: 0
CONSTIPATION: 0

## 2019-03-11 NOTE — TELEPHONE ENCOUNTER
"S-(situation): \"Having insane back pain, it feels like back labor\".  \"Something needs to be done this is making my life hell\".    B-(background): Mirena placed 12-20-18.    A-(assessment): Pt reports she feels like she has the flu x 2-3wks.  She denies having a fever, positive for nausea, denies vomiting.  Was seen in [facility] on 01-21-19 and again 02-12-19 for tonsillitis treated with abx and prednisone.  She has had an increase in discharge but \"not that much all the time\".  Has been spotting x 2 wks.  She questions if this could be cancer, has sister diagnosed with ovarian cancer at age 22 and mom with cervical cancer at age 25.  Last pap in chart 05-21-13.  Pt no showed post partum visit that was scheduled on 10-09-17.  Pt has hx of no showing and cancelling appts.    R-(recommendations): Will route concern to provider to review and advise.      Shirley Hatfield  [facility] RN    "
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
## 2019-03-11 NOTE — ED AVS SNAPSHOT
[facility] Emergency Department  [address] [address]  Phone:  [phone]  Fax:  [phone]                                    Michelle Torres   MRN: [id number]    Department:  [facility] Emergency Department   Date of Visit:  3/11/2019           After Visit Summary Signature Page    I have received my discharge instructions, and my questions have been answered. I have discussed any challenges I see with this plan with the nurse or doctor.    ..........................................................................................................................................  Patient/Patient Representative Signature      ..........................................................................................................................................  Patient Representative Print Name and Relationship to Patient    ..................................................               ................................................  Date                                   Time    ..........................................................................................................................................  Reviewed by Signature/Title    ...................................................              ..............................................  Date                                               Time          22EPIC Rev 08/18

## 2019-03-11 NOTE — ED NOTES
Pt here with a positive home pregnancy test, has a Mirena IUD in place- was placed in December. Pt has has irregular menses since the IUD was placed, started having lower back pain and felt more fatigued about 2 weeks ago, over the weekend she started feeling more nauseated. .
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
## 2019-03-11 NOTE — TELEPHONE ENCOUNTER
"Patient returning call to clinic.  Patient reports she took a home pregnancy test and it was positive. Patient reports \" the first line is faintly there and the second line is super dark, I think it is positive.\" Patient reports she is having spotting and cramping with pain in her back. Patient tearful.    Patient advised she needs to be seen in the ER now for further evaluation. Will route to provider as FYI.    Pt in agreement and reports understanding.    Angy Aguilar   Ob/Gyn Clinic  RN        "

## 2019-03-11 NOTE — TELEPHONE ENCOUNTER
Reason for Call:  Other IUD , problems    Detailed comments: Pt states she is having nausea, abdominal cramping, back pain, could it be an IUD issue? Please call , pt very upset    Phone Number Patient can be reached at: Home number on file [phone] (home)    Best Time: today    Can we leave a detailed message on this number? YES    Call taken on 3/11/2019 at 3:41 PM by Alicia Spain

## 2019-03-12 NOTE — DISCHARGE INSTRUCTIONS
Return to the emergency department if you have repeated vomiting, fevers, worsening symptoms, or other concerns.  Otherwise follow-up in clinic.

## 2019-03-12 NOTE — RESULT ENCOUNTER NOTE
Final result for both N. Gonorrhoeae PCR and Chlamydia Trachomatis PCR are NEGATIVE.  No treatment or change in treatment per [facility] ED Lab Result protocol.

## 2019-03-26 ENCOUNTER — OFFICE VISIT (OUTPATIENT)
Dept: OBGYN | Facility: CLINIC | Age: 29
End: 2019-03-26
Payer: COMMERCIAL

## 2019-03-26 VITALS
WEIGHT: 122 LBS | TEMPERATURE: 98.9 F | DIASTOLIC BLOOD PRESSURE: 79 MMHG | BODY MASS INDEX: 20.83 KG/M2 | RESPIRATION RATE: 16 BRPM | HEART RATE: 105 BPM | HEIGHT: 64 IN | SYSTOLIC BLOOD PRESSURE: 108 MMHG

## 2019-03-26 DIAGNOSIS — M62.89 HIGH-TONE PELVIC FLOOR DYSFUNCTION: Primary | ICD-10-CM

## 2019-03-26 PROCEDURE — 99214 OFFICE O/P EST MOD 30 MIN: CPT | Performed by: OBSTETRICS & GYNECOLOGY

## 2019-03-26 ASSESSMENT — MIFFLIN-ST. JEOR: SCORE: 1278.39
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
## 2019-03-26 NOTE — NURSING NOTE
"Initial /79 (BP Location: Right arm, Patient Position: Sitting, Cuff Size: Adult Regular)   Pulse 105   Temp 98.9  F (37.2  C) (Tympanic)   Resp 16   Ht 1.626 m (5' 4\")   Wt 55.3 kg (122 lb)   Breastfeeding? No   BMI 20.94 kg/m   Estimated body mass index is 20.94 kg/m  as calculated from the following:    Height as of this encounter: 1.626 m (5' 4\").    Weight as of this encounter: 55.3 kg (122 lb). .    Karissa Garcia CMA    "

## 2019-07-03 ENCOUNTER — APPOINTMENT (OUTPATIENT)
Dept: ULTRASOUND IMAGING | Facility: CLINIC | Age: 29
End: 2019-07-03
Attending: FAMILY MEDICINE
Payer: MEDICAID

## 2019-07-03 ENCOUNTER — HOSPITAL ENCOUNTER (EMERGENCY)
Facility: CLINIC | Age: 29
Discharge: HOME | End: 2019-07-03
Attending: FAMILY MEDICINE | Admitting: FAMILY MEDICINE
Payer: MEDICAID

## 2019-07-03 VITALS
RESPIRATION RATE: 16 BRPM | HEART RATE: 115 BPM | SYSTOLIC BLOOD PRESSURE: 128 MMHG | OXYGEN SATURATION: 97 % | TEMPERATURE: 100.4 F | WEIGHT: 120 LBS | BODY MASS INDEX: 20.6 KG/M2 | DIASTOLIC BLOOD PRESSURE: 92 MMHG

## 2019-07-03 DIAGNOSIS — R10.2 PELVIC PAIN IN FEMALE: ICD-10-CM

## 2019-07-03 LAB
ALBUMIN SERPL-MCNC: 4 G/DL (ref 3.4–5)
ALBUMIN UR-MCNC: NEGATIVE MG/DL
ALP SERPL-CCNC: 68 U/L (ref 40–150)
ALT SERPL W P-5'-P-CCNC: 16 U/L (ref 0–50)
ANION GAP SERPL CALCULATED.3IONS-SCNC: 6 MMOL/L (ref 3–14)
APPEARANCE UR: ABNORMAL
AST SERPL W P-5'-P-CCNC: 8 U/L (ref 0–45)
BASOPHILS # BLD AUTO: 0 10E9/L (ref 0–0.2)
BASOPHILS NFR BLD AUTO: 0.3 %
BILIRUB SERPL-MCNC: 0.7 MG/DL (ref 0.2–1.3)
BILIRUB UR QL STRIP: NEGATIVE
BUN SERPL-MCNC: 13 MG/DL (ref 7–30)
CALCIUM SERPL-MCNC: 9.4 MG/DL (ref 8.5–10.1)
CHLORIDE SERPL-SCNC: 105 MMOL/L (ref 94–109)
CO2 SERPL-SCNC: 26 MMOL/L (ref 20–32)
COLOR UR AUTO: YELLOW
CREAT SERPL-MCNC: 0.6 MG/DL (ref 0.52–1.04)
DIFFERENTIAL METHOD BLD: ABNORMAL
EOSINOPHIL # BLD AUTO: 0.1 10E9/L (ref 0–0.7)
EOSINOPHIL NFR BLD AUTO: 0.5 %
ERYTHROCYTE [DISTWIDTH] IN BLOOD BY AUTOMATED COUNT: 14.5 % (ref 10–15)
GFR SERPL CREATININE-BSD FRML MDRD: >90 ML/MIN/{1.73_M2}
GLUCOSE SERPL-MCNC: 87 MG/DL (ref 70–99)
GLUCOSE UR STRIP-MCNC: NEGATIVE MG/DL
HCG SERPL QL: NEGATIVE
HCT VFR BLD AUTO: 44.1 % (ref 35–47)
HGB BLD-MCNC: 14.1 G/DL (ref 11.7–15.7)
HGB UR QL STRIP: ABNORMAL
IMM GRANULOCYTES # BLD: 0.1 10E9/L (ref 0–0.4)
IMM GRANULOCYTES NFR BLD: 0.4 %
KETONES UR STRIP-MCNC: 5 MG/DL
LEUKOCYTE ESTERASE UR QL STRIP: NEGATIVE
LIPASE SERPL-CCNC: 53 U/L (ref 73–393)
LYMPHOCYTES # BLD AUTO: 1.3 10E9/L (ref 0.8–5.3)
LYMPHOCYTES NFR BLD AUTO: 10.4 %
MCH RBC QN AUTO: 28.8 PG (ref 26.5–33)
MCHC RBC AUTO-ENTMCNC: 32 G/DL (ref 31.5–36.5)
MCV RBC AUTO: 90 FL (ref 78–100)
MONOCYTES # BLD AUTO: 0.7 10E9/L (ref 0–1.3)
MONOCYTES NFR BLD AUTO: 5.1 %
MUCOUS THREADS #/AREA URNS LPF: PRESENT /LPF
NEUTROPHILS # BLD AUTO: 10.8 10E9/L (ref 1.6–8.3)
NEUTROPHILS NFR BLD AUTO: 83.3 %
NITRATE UR QL: NEGATIVE
NRBC # BLD AUTO: 0 10*3/UL
NRBC BLD AUTO-RTO: 0 /100
PH UR STRIP: 5 PH (ref 5–7)
PLATELET # BLD AUTO: 405 10E9/L (ref 150–450)
POTASSIUM SERPL-SCNC: 3.6 MMOL/L (ref 3.4–5.3)
PROT SERPL-MCNC: 7.7 G/DL (ref 6.8–8.8)
RBC # BLD AUTO: 4.9 10E12/L (ref 3.8–5.2)
RBC #/AREA URNS AUTO: 2 /HPF (ref 0–2)
SODIUM SERPL-SCNC: 137 MMOL/L (ref 133–144)
SOURCE: ABNORMAL
SP GR UR STRIP: 1.03 (ref 1–1.03)
SPECIMEN SOURCE: NORMAL
SQUAMOUS #/AREA URNS AUTO: 2 /HPF (ref 0–1)
UROBILINOGEN UR STRIP-MCNC: 0 MG/DL (ref 0–2)
WBC # BLD AUTO: 12.9 10E9/L (ref 4–11)
WBC #/AREA URNS AUTO: 3 /HPF (ref 0–5)
WET PREP SPEC: NORMAL

## 2019-07-03 PROCEDURE — 87491 CHLMYD TRACH DNA AMP PROBE: CPT | Performed by: FAMILY MEDICINE

## 2019-07-03 PROCEDURE — 80053 COMPREHEN METABOLIC PANEL: CPT | Performed by: FAMILY MEDICINE

## 2019-07-03 PROCEDURE — 76830 TRANSVAGINAL US NON-OB: CPT

## 2019-07-03 PROCEDURE — 96361 HYDRATE IV INFUSION ADD-ON: CPT | Performed by: FAMILY MEDICINE

## 2019-07-03 PROCEDURE — 99284 EMERGENCY DEPT VISIT MOD MDM: CPT | Mod: Z6 | Performed by: FAMILY MEDICINE

## 2019-07-03 PROCEDURE — 87210 SMEAR WET MOUNT SALINE/INK: CPT | Performed by: FAMILY MEDICINE

## 2019-07-03 PROCEDURE — 25800030 ZZH RX IP 258 OP 636: Performed by: FAMILY MEDICINE

## 2019-07-03 PROCEDURE — 25000132 ZZH RX MED GY IP 250 OP 250 PS 637: Performed by: FAMILY MEDICINE

## 2019-07-03 PROCEDURE — 87591 N.GONORRHOEAE DNA AMP PROB: CPT | Performed by: FAMILY MEDICINE

## 2019-07-03 PROCEDURE — 96374 THER/PROPH/DIAG INJ IV PUSH: CPT | Performed by: FAMILY MEDICINE

## 2019-07-03 PROCEDURE — 25000128 H RX IP 250 OP 636: Performed by: FAMILY MEDICINE

## 2019-07-03 PROCEDURE — 83690 ASSAY OF LIPASE: CPT | Performed by: FAMILY MEDICINE

## 2019-07-03 PROCEDURE — 99284 EMERGENCY DEPT VISIT MOD MDM: CPT | Mod: 25 | Performed by: FAMILY MEDICINE

## 2019-07-03 PROCEDURE — 85025 COMPLETE CBC W/AUTO DIFF WBC: CPT | Performed by: FAMILY MEDICINE

## 2019-07-03 PROCEDURE — 81001 URINALYSIS AUTO W/SCOPE: CPT | Performed by: FAMILY MEDICINE

## 2019-07-03 PROCEDURE — 84703 CHORIONIC GONADOTROPIN ASSAY: CPT | Performed by: FAMILY MEDICINE

## 2019-07-03 RX ORDER — KETOROLAC TROMETHAMINE 30 MG/ML
30 INJECTION, SOLUTION INTRAMUSCULAR; INTRAVENOUS ONCE
Status: DISCONTINUED | OUTPATIENT
Start: 2019-07-03 | End: 2019-07-04 | Stop reason: HOSPADM

## 2019-07-03 RX ORDER — CEFTRIAXONE SODIUM 1 G
250 VIAL (EA) INJECTION ONCE
Status: DISCONTINUED | OUTPATIENT
Start: 2019-07-03 | End: 2019-07-04 | Stop reason: HOSPADM

## 2019-07-03 RX ORDER — ONDANSETRON 2 MG/ML
4 INJECTION INTRAMUSCULAR; INTRAVENOUS ONCE
Status: COMPLETED | OUTPATIENT
Start: 2019-07-03 | End: 2019-07-03

## 2019-07-03 RX ORDER — DOXYCYCLINE 100 MG/1
100 CAPSULE ORAL EVERY 12 HOURS SCHEDULED
Status: DISCONTINUED | OUTPATIENT
Start: 2019-07-03 | End: 2019-07-04 | Stop reason: HOSPADM

## 2019-07-03 RX ORDER — DOXYCYCLINE 100 MG/1
100 CAPSULE ORAL 2 TIMES DAILY
Qty: 28 CAPSULE | Refills: 0 | Status: SHIPPED | OUTPATIENT
Start: 2019-07-03 | End: 2020-01-06

## 2019-07-03 RX ORDER — LIDOCAINE HYDROCHLORIDE 10 MG/ML
INJECTION, SOLUTION EPIDURAL; INFILTRATION; INTRACAUDAL; PERINEURAL
Status: DISCONTINUED
Start: 2019-07-03 | End: 2019-07-03 | Stop reason: WASHOUT

## 2019-07-03 RX ADMIN — DOXYCYCLINE 100 MG: 100 CAPSULE ORAL at 23:37

## 2019-07-03 RX ADMIN — SODIUM CHLORIDE, POTASSIUM CHLORIDE, SODIUM LACTATE AND CALCIUM CHLORIDE 1000 ML: 600; 310; 30; 20 INJECTION, SOLUTION INTRAVENOUS at 21:47

## 2019-07-03 RX ADMIN — ONDANSETRON 4 MG: 2 INJECTION INTRAMUSCULAR; INTRAVENOUS at 20:16

## 2019-07-03 NOTE — ED AVS SNAPSHOT
[facility] Emergency Department  [address] [address]  Phone:  [phone]  Fax:  [phone]                                    Michelle Torres   MRN: [id number]    Department:  [facility] Emergency Department   Date of Visit:  7/3/2019           After Visit Summary Signature Page    I have received my discharge instructions, and my questions have been answered. I have discussed any challenges I see with this plan with the nurse or doctor.    ..........................................................................................................................................  Patient/Patient Representative Signature      ..........................................................................................................................................  Patient Representative Print Name and Relationship to Patient    ..................................................               ................................................  Date                                   Time    ..........................................................................................................................................  Reviewed by Signature/Title    ...................................................              ..............................................  Date                                               Time          22EPIC Rev 08/18

## 2019-07-04 ENCOUNTER — NURSE TRIAGE (OUTPATIENT)
Dept: NURSING | Facility: CLINIC | Age: 29
End: 2019-07-04

## 2019-07-04 NOTE — DISCHARGE INSTRUCTIONS
You have indicated that you need to go into your work-up is not complete.  I do not have the results back of your ultrasound of the time he requested to leave.  I suspect that this may represent a pelvic infection called pelvic inflammatory disease.  Ideally with significant abdominal pain, nausea, vomiting initially treated with hospital admission as I discussed with you.  He received antibiotics empirically in the emergency department directed at a possible infection like this, please take doxycycline as prescribed for you and complete the entire course of antibiotics.  If you are not feeling better please return to the emergency department.

## 2019-07-04 NOTE — TELEPHONE ENCOUNTER
doxcycycline hyclate is what was prescribed. She wants to make sure it will treat the PID. Some lab results pending. I connected her with the lab results RN:  .  She also wanted to know if her boyfriend should be tested. I told her if she is suspecting a STD, and if she comes back positive, yes, he should be tested.  I used UpToDate to answer her question as to whether or not PID is always related to STD. It is 85% of the cases.  Agnieszka Arrieta RN-[facility]
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
## 2019-07-09 ENCOUNTER — OFFICE VISIT (OUTPATIENT)
Dept: OBGYN | Facility: CLINIC | Age: 29
End: 2019-07-09
Payer: MEDICAID

## 2019-07-09 VITALS
DIASTOLIC BLOOD PRESSURE: 88 MMHG | BODY MASS INDEX: 22.31 KG/M2 | RESPIRATION RATE: 16 BRPM | HEART RATE: 105 BPM | TEMPERATURE: 98.3 F | SYSTOLIC BLOOD PRESSURE: 123 MMHG | HEIGHT: 62 IN | WEIGHT: 121.2 LBS

## 2019-07-09 DIAGNOSIS — Z01.419 ENCOUNTER FOR GYNECOLOGICAL EXAMINATION WITHOUT ABNORMAL FINDING: ICD-10-CM

## 2019-07-09 DIAGNOSIS — N73.0 PID (ACUTE PELVIC INFLAMMATORY DISEASE): Primary | ICD-10-CM

## 2019-07-09 DIAGNOSIS — N94.6 DYSMENORRHEA: ICD-10-CM

## 2019-07-09 DIAGNOSIS — Z30.432 ENCOUNTER FOR IUD REMOVAL: ICD-10-CM

## 2019-07-09 PROBLEM — Z34.80 PRENATAL CARE, SUBSEQUENT PREGNANCY: Status: RESOLVED | Noted: 2017-07-06 | Resolved: 2019-07-09

## 2019-07-09 PROCEDURE — G0145 SCR C/V CYTO,THINLAYER,RESCR: HCPCS | Performed by: OBSTETRICS & GYNECOLOGY

## 2019-07-09 PROCEDURE — 96372 THER/PROPH/DIAG INJ SC/IM: CPT | Performed by: OBSTETRICS & GYNECOLOGY

## 2019-07-09 PROCEDURE — 99213 OFFICE O/P EST LOW 20 MIN: CPT | Mod: 25 | Performed by: OBSTETRICS & GYNECOLOGY

## 2019-07-09 PROCEDURE — 58301 REMOVE INTRAUTERINE DEVICE: CPT | Performed by: OBSTETRICS & GYNECOLOGY

## 2019-07-09 RX ORDER — MEDROXYPROGESTERONE ACETATE 150 MG/ML
150 INJECTION, SUSPENSION INTRAMUSCULAR
Status: DISCONTINUED | OUTPATIENT
Start: 2019-07-09 | End: 2020-05-26

## 2019-07-09 RX ADMIN — MEDROXYPROGESTERONE ACETATE 150 MG: 150 INJECTION, SUSPENSION INTRAMUSCULAR at 09:56

## 2019-07-09 ASSESSMENT — MIFFLIN-ST. JEOR: SCORE: 1238.01

## 2019-07-09 NOTE — PROGRESS NOTES
Clinic Administered Medication Documentation      Depo Provera Documentation    Prior to injection, verified patient identity using patient's name and date of birth. Medication was administered. Please see MAR and medication order for additional information. Patient instructed to remain in clinic for 15 minutes.    BP: 123/88    LAST PAP/EXAM:   Lab Results   Component Value Date    PAP NIL 05/21/2013     URINE HCG:not indicated    NEXT INJECTION DUE: 9/24/19 - 10/8/19    Was entire vial of medication used? Yes  Vial/Syringe: Single dose vial  Expiration Date:  02/20

## 2019-07-09 NOTE — PROGRESS NOTES
CC:  Follow up  from ED on 7/3/19 for possible PID  HPI:  Michelle Torres is a 29 year old female is a   .  Patient's last menstrual period was 06/25/2019.  Menses are regular q 28-30 days, lasting 4 days. She has had    Patients records are available and reviewed at today's visit.    Past GYN history:  No STD history       Last PAP smear:  Normal  Last TSH:   TSH   Date Value Ref Range Status   07/16/2018 0.63 0.40 - 4.00 mU/L Final    , normal?  Yes    Past Medical History:   Diagnosis Date     Chickenpox      Depression      Other abnormal heart sounds     present since birth       Past Surgical History:   Procedure Laterality Date     SURGICAL HISTORY OF -   age 9    impacted teeth       Family History   Problem Relation Age of Onset     Cancer Mother         cervical     Hypertension Mother      Depression Mother      Anxiety Disorder Mother      Alcohol/Drug Mother         drugs recovered     Depression Father      Alcohol/Drug Father         drugs- recovered     C.A.D. Maternal Grandmother      Heart Disease Maternal Grandmother         valve replacement     Cerebrovascular Disease Maternal Grandmother         brain aneurysm     Diabetes Maternal Grandmother      Alcohol/Drug Maternal Grandmother         alcohol     Cerebrovascular Disease Maternal Grandfather         brain aneurysm     Diabetes Paternal Grandmother      Depression Paternal Grandmother      C.A.D. Paternal Grandfather         triple bypass     Cancer Paternal Grandfather         renal cell     Depression Paternal Grandfather      Cancer Sister         ovarian     Alcohol/Drug Sister         drugs- recovered       Allergies: Bees; Eggs; Oxycodone; and Rocephin [ceftriaxone]    Current Outpatient Medications   Medication Sig Dispense Refill     doxycycline hyclate (VIBRAMYCIN) 100 MG capsule Take 1 capsule (100 mg) by mouth 2 times daily for 14 days 28 capsule 0     levonorgestrel (MIRENA) 20 MCG/24HR IUD 1 each by Intrauterine route once    "      ROS:  C: NEGATIVE for fever, chills, change in weight  I: NEGATIVE for worrisome rashes, moles or lesions  E: NEGATIVE for vision changes or irritation  E/M: NEGATIVE for ear, mouth and throat problems  R: NEGATIVE for significant cough or SOB  CV: NEGATIVE for chest pain, palpitations or peripheral edema  GI: NEGATIVE for nausea, abdominal pain, heartburn, or change in bowel habits  : NEGATIVE for frequency, dysuria, hematuria, vaginal discharge  M: NEGATIVE for significant arthralgias or myalgia  N: NEGATIVE for weakness, dizziness or paresthesias  E: NEGATIVE for temperature intolerance, skin/hair changes  P: NEGATIVE for changes in mood or affect    EXAM:  Blood pressure 123/88, pulse 105, temperature 98.3  F (36.8  C), resp. rate 16, height 1.575 m (5' 2\"), weight 55 kg (121 lb 3.2 oz), last menstrual period 06/25/2019, not currently breastfeeding.   BMI= Body mass index is 22.17 kg/m .  General - pleasant female in no acute distress.  Abdomen - soft, tender LLQ no rebound, nondistended, no hepatosplenomegaly.  Pelvic - EG: normal adult female,   BUS: within normal limits,   Vagina: well rugated, no discharge,   Cervix: no lesions or CMT, moderate eversion  Uterus: firm, normal sized and nontender,   Adnexae: no masses or tenderness.  Rectovaginal - deferred.  Musculoskeletal - no gross deformities.  Neurological - normal strength, sensation, and mental status.    Procedure:  After consent was obtained from the patient, IUD strings were grasped with ring forcep and IUD easily removed intact with minimal patient discomfort noted.  No bleeding noted.    ASSESSMENT/PLAN:    .(N73.0) PID (acute pelvic inflammatory disease)  (primary encounter diagnosis)  Comment:   Plan: REMOVE INTRAUTERINE DEVICE            (Z30.432) Encounter for IUD removal  Comment: cramping for the last 2-3 months  Plan: REMOVE INTRAUTERINE DEVICE            (N94.6) Dysmenorrhea  Comment: history  Plan: REMOVE INTRAUTERINE DEVICE       "  Consider PT evaluation        Letter will be sent to the referring provider.    Ray Bautista

## 2019-07-09 NOTE — LETTER
July 12, 2019      Michelle Torres  [address]  [address]  [facility] [address]    Dear ,      I am happy to inform you that your recent cervical cancer screening test (PAP smear) was normal.      Preventative screenings such as this help to ensure your health for years to come. You should repeat a pap smear in 3 years, unless otherwise directed.      You will still need to return to the clinic every year for your annual exam and other preventive tests.     If you have additional questions regarding this result, please call our registered nurse, Karissa at [phone].      Sincerely,      Ray Bautista MD/[place]

## 2019-07-11 LAB
COPATH REPORT: NORMAL
PAP: NORMAL

## 2019-09-09 ENCOUNTER — TELEPHONE (OUTPATIENT)
Dept: FAMILY MEDICINE | Facility: CLINIC | Age: 29
End: 2019-09-09

## 2019-09-09 NOTE — TELEPHONE ENCOUNTER
Panel Management Review      Patient has the following on her problem list:     Depression / Dysthymia review    Measure:  Needs PHQ-9 score of 4 or less during index window.  Administer PHQ-9 and if score is 5 or more, send encounter to provider for next steps.    5 - 7 month window range:     PHQ-9 SCORE 11/21/2007 6/12/2017 4/9/2018   PHQ-9 Total Score 2 - -   PHQ-9 Total Score - 7 10       If PHQ-9 recheck is 5 or more, route to provider for next steps.    Patient is due for:  PHQ9 and DAP      Composite cancer screening  Chart review shows that this patient is due/due soon for the following None  Summary:    Patient is due/failing the following:   Flu vaccine, DAP, PHQ9 and PHYSICAL    Action needed:   Patient needs office visit for physical.    Type of outreach:    Sent [employer] message.    Questions for provider review:    None                                                                                   Alicia Campbell CMA         Chart routed to none .

## 2019-10-14 ENCOUNTER — ALLIED HEALTH/NURSE VISIT (OUTPATIENT)
Dept: FAMILY MEDICINE | Facility: CLINIC | Age: 29
End: 2019-10-14
Payer: MEDICAID

## 2019-10-14 VITALS — WEIGHT: 123.5 LBS | SYSTOLIC BLOOD PRESSURE: 110 MMHG | DIASTOLIC BLOOD PRESSURE: 80 MMHG | BODY MASS INDEX: 22.59 KG/M2

## 2019-10-14 DIAGNOSIS — Z30.9 CONTRACEPTIVE MANAGEMENT: Primary | ICD-10-CM

## 2019-10-14 LAB — HCG UR QL: NEGATIVE

## 2019-10-14 PROCEDURE — 81025 URINE PREGNANCY TEST: CPT | Performed by: NURSE PRACTITIONER

## 2019-10-14 PROCEDURE — 96372 THER/PROPH/DIAG INJ SC/IM: CPT

## 2019-10-14 PROCEDURE — 99207 ZZC NO CHARGE NURSE ONLY: CPT

## 2019-10-14 RX ADMIN — MEDROXYPROGESTERONE ACETATE 150 MG: 150 INJECTION, SUSPENSION INTRAMUSCULAR at 13:21

## 2019-10-16 ENCOUNTER — NURSE TRIAGE (OUTPATIENT)
Dept: NURSING | Facility: CLINIC | Age: 29
End: 2019-10-16

## 2019-10-16 ENCOUNTER — TELEPHONE (OUTPATIENT)
Dept: OBGYN | Facility: CLINIC | Age: 29
End: 2019-10-16

## 2019-10-16 NOTE — TELEPHONE ENCOUNTER
S: Michelle called with concerns about her depo shot which she received at [facility] on Monday.    B: Michelle repots that she has developed leg pain, fever of 102 (states that last night temp went to 105) and that her injection site is swollen and has some numbness down leg on same side of body where shot was given.  She has been on depo for a number of years, has never had a reaction.    A:  Attempted to transfer patient directly to OB triage nurse as clinic is still open.  GARY Barreto took information and states that she will call patient back.    R:  Notified that [facility] Triage nurse will be contacting her.      Trudy Hartman RN  [facility]

## 2019-10-16 NOTE — TELEPHONE ENCOUNTER
"Return call to patient.  Spoke with patient on the phone,    S-(situation): Patient reports \" my whole body aches like it does when you have the flu.\" Patient reports body temperature last night was 105, today down to 102. Patient reports injection site from Depo-Provera is not red or swollen but does report it to be tender to the touch. Patient reports there is not any drainage. Patient reports injection site is not hot to the touch. Patient reports upper buttock and low back pain which feels achey. Patient reports she is able to walk and bear weight. Patient denies pain with walking. Patient reports aching extends down her legs into her feet. Patient denies numbness or weakness, denies any tingling. Patient repetitively reports \" I just think I have the flu.\" patient states \" I heard it is out there now and is bad. I think my fever is getting better.\"    B-(background): Depo-provera injection in [facility] clinic on Monday 10/14/19    A-(assessment): body aches and pain, fever, tenderness at injection site from Depo-provera on Monday    R-(recommendations): Tylenol and Ibuprofen per package directions, rest and fluids. Urgent Care for further evaluation.     Patient thinks she will forego Urgent Care visit for tonight despite recommendation. Patient states she will monitor and \" go to Urgent Care tomorrow if I am not any better.\"    Patient advised to call PCP clinic with further questions or concerns.    Pt in agreement and reports understanding.    Angy Aguilar   Ob/Gyn Clinic  RN    "

## 2019-10-17 ENCOUNTER — HOSPITAL ENCOUNTER (EMERGENCY)
Facility: CLINIC | Age: 29
Discharge: HOME | End: 2019-10-17
Attending: EMERGENCY MEDICINE | Admitting: EMERGENCY MEDICINE
Payer: MEDICAID

## 2019-10-17 ENCOUNTER — APPOINTMENT (OUTPATIENT)
Dept: CT IMAGING | Facility: CLINIC | Age: 29
End: 2019-10-17
Attending: EMERGENCY MEDICINE
Payer: MEDICAID

## 2019-10-17 VITALS
TEMPERATURE: 99.4 F | HEART RATE: 103 BPM | SYSTOLIC BLOOD PRESSURE: 96 MMHG | DIASTOLIC BLOOD PRESSURE: 77 MMHG | BODY MASS INDEX: 22.5 KG/M2 | OXYGEN SATURATION: 100 % | RESPIRATION RATE: 16 BRPM | WEIGHT: 123 LBS

## 2019-10-17 DIAGNOSIS — J02.9 PHARYNGITIS, UNSPECIFIED ETIOLOGY: ICD-10-CM

## 2019-10-17 DIAGNOSIS — R50.9 FEBRILE ILLNESS: ICD-10-CM

## 2019-10-17 LAB
ALBUMIN UR-MCNC: 30 MG/DL
APPEARANCE UR: ABNORMAL
BILIRUB UR QL STRIP: NEGATIVE
COLOR UR AUTO: YELLOW
FLUAV+FLUBV AG SPEC QL: NEGATIVE
FLUAV+FLUBV AG SPEC QL: NEGATIVE
GLUCOSE UR STRIP-MCNC: NEGATIVE MG/DL
HGB UR QL STRIP: ABNORMAL
KETONES UR STRIP-MCNC: 80 MG/DL
LEUKOCYTE ESTERASE UR QL STRIP: NEGATIVE
MUCOUS THREADS #/AREA URNS LPF: PRESENT /LPF
NITRATE UR QL: NEGATIVE
PH UR STRIP: 5 PH (ref 5–7)
RBC #/AREA URNS AUTO: 17 /HPF (ref 0–2)
SOURCE: ABNORMAL
SP GR UR STRIP: 1.02 (ref 1–1.03)
SPECIMEN SOURCE: NORMAL
SQUAMOUS #/AREA URNS AUTO: 1 /HPF (ref 0–1)
UROBILINOGEN UR STRIP-MCNC: 0 MG/DL (ref 0–2)
WBC #/AREA URNS AUTO: 3 /HPF (ref 0–5)

## 2019-10-17 PROCEDURE — 74176 CT ABD & PELVIS W/O CONTRAST: CPT

## 2019-10-17 PROCEDURE — 87804 INFLUENZA ASSAY W/OPTIC: CPT | Performed by: EMERGENCY MEDICINE

## 2019-10-17 PROCEDURE — 99285 EMERGENCY DEPT VISIT HI MDM: CPT | Mod: 25

## 2019-10-17 PROCEDURE — 25000128 H RX IP 250 OP 636: Performed by: EMERGENCY MEDICINE

## 2019-10-17 PROCEDURE — 81001 URINALYSIS AUTO W/SCOPE: CPT | Performed by: EMERGENCY MEDICINE

## 2019-10-17 PROCEDURE — 99284 EMERGENCY DEPT VISIT MOD MDM: CPT | Mod: Z6 | Performed by: EMERGENCY MEDICINE

## 2019-10-17 PROCEDURE — 96372 THER/PROPH/DIAG INJ SC/IM: CPT

## 2019-10-17 RX ORDER — AMOXICILLIN 400 MG/5ML
1000 POWDER, FOR SUSPENSION ORAL 2 TIMES DAILY
Qty: 250 ML | Refills: 0 | Status: SHIPPED | OUTPATIENT
Start: 2019-10-17 | End: 2020-01-06

## 2019-10-17 RX ORDER — KETOROLAC TROMETHAMINE 30 MG/ML
30 INJECTION, SOLUTION INTRAMUSCULAR; INTRAVENOUS ONCE
Status: COMPLETED | OUTPATIENT
Start: 2019-10-17 | End: 2019-10-17

## 2019-10-17 RX ADMIN — KETOROLAC TROMETHAMINE 30 MG: 30 INJECTION, SOLUTION INTRAMUSCULAR at 15:04

## 2019-10-17 NOTE — ED NOTES
Pt here with flu like symptoms, body aches and fever to 104 at home. Alert oriented does not appear to be in distress. Taking tylenol and ibuprofen for the symptoms.
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
## 2019-10-17 NOTE — ED AVS SNAPSHOT
[facility] Emergency Department  [address] [address]  Phone:  [phone]  Fax:  [phone]                                    Michelle Torres   MRN: [id number]    Department:  [facility] Emergency Department   Date of Visit:  10/17/2019           After Visit Summary Signature Page    I have received my discharge instructions, and my questions have been answered. I have discussed any challenges I see with this plan with the nurse or doctor.    ..........................................................................................................................................  Patient/Patient Representative Signature      ..........................................................................................................................................  Patient Representative Print Name and Relationship to Patient    ..................................................               ................................................  Date                                   Time    ..........................................................................................................................................  Reviewed by Signature/Title    ...................................................              ..............................................  Date                                               Time          22EPIC Rev 08/18

## 2019-10-17 NOTE — DISCHARGE INSTRUCTIONS
Push fluids.  Amoxicillin as directed as you may have strep pharyngitis.  Tylenol / ibuprofen for fever or body aches.

## 2019-11-28 ENCOUNTER — HOSPITAL ENCOUNTER (EMERGENCY)
Facility: CLINIC | Age: 29
Discharge: HOME | End: 2019-11-28
Attending: EMERGENCY MEDICINE | Admitting: EMERGENCY MEDICINE
Payer: COMMERCIAL

## 2019-11-28 ENCOUNTER — APPOINTMENT (OUTPATIENT)
Dept: GENERAL RADIOLOGY | Facility: CLINIC | Age: 29
End: 2019-11-28
Attending: EMERGENCY MEDICINE
Payer: COMMERCIAL

## 2019-11-28 VITALS
RESPIRATION RATE: 18 BRPM | SYSTOLIC BLOOD PRESSURE: 110 MMHG | BODY MASS INDEX: 21.79 KG/M2 | HEIGHT: 63 IN | DIASTOLIC BLOOD PRESSURE: 75 MMHG | HEART RATE: 112 BPM | OXYGEN SATURATION: 96 % | WEIGHT: 123 LBS | TEMPERATURE: 98 F

## 2019-11-28 DIAGNOSIS — J20.9 ACUTE BRONCHITIS, UNSPECIFIED ORGANISM: ICD-10-CM

## 2019-11-28 DIAGNOSIS — J06.9 UPPER RESPIRATORY TRACT INFECTION, UNSPECIFIED TYPE: ICD-10-CM

## 2019-11-28 PROCEDURE — 99284 EMERGENCY DEPT VISIT MOD MDM: CPT | Mod: 25 | Performed by: EMERGENCY MEDICINE

## 2019-11-28 PROCEDURE — 99284 EMERGENCY DEPT VISIT MOD MDM: CPT | Mod: Z6 | Performed by: EMERGENCY MEDICINE

## 2019-11-28 PROCEDURE — 71046 X-RAY EXAM CHEST 2 VIEWS: CPT

## 2019-11-28 RX ORDER — AZITHROMYCIN 250 MG/1
TABLET, FILM COATED ORAL
Qty: 6 TABLET | Refills: 0 | Status: SHIPPED | OUTPATIENT
Start: 2019-11-28 | End: 2020-01-06

## 2019-11-28 RX ORDER — PREDNISONE 20 MG/1
TABLET ORAL
Qty: 8 TABLET | Refills: 0 | Status: SHIPPED | OUTPATIENT
Start: 2019-11-28 | End: 2020-05-26

## 2019-11-28 ASSESSMENT — MIFFLIN-ST. JEOR: SCORE: 1262.05

## 2019-11-28 NOTE — ED AVS SNAPSHOT
[facility] Emergency Department  [address] [address]  Phone:  [phone]  Fax:  [phone]                                    Michelle Torres   MRN: [id number]    Department:  [facility] Emergency Department   Date of Visit:  11/28/2019           After Visit Summary Signature Page    I have received my discharge instructions, and my questions have been answered. I have discussed any challenges I see with this plan with the nurse or doctor.    ..........................................................................................................................................  Patient/Patient Representative Signature      ..........................................................................................................................................  Patient Representative Print Name and Relationship to Patient    ..................................................               ................................................  Date                                   Time    ..........................................................................................................................................  Reviewed by Signature/Title    ...................................................              ..............................................  Date                                               Time          22EPIC Rev 08/18

## 2019-11-28 NOTE — ED NOTES
Productive cough with green and bloody sputum, shortness of breath with exertion, and right-sided rib pain for approximately 1 week. Patient denies fever.  Cough drops and Ibuprofen at 1000.  Patient is a smoker.

## 2019-11-28 NOTE — DISCHARGE INSTRUCTIONS
Return if symptoms worsen or new symptoms develop.  Take steroids as directed.  The knees do not improve or you develop a fever begin Zithromax.  Drink plenty of fluids use over-the-counter cough medications.  If increased shortness of breath chest pain nausea vomiting or other symptoms present please return for further evaluation and care per

## 2019-12-02 ENCOUNTER — PATIENT OUTREACH (OUTPATIENT)
Dept: CARE COORDINATION | Facility: CLINIC | Age: 29
End: 2019-12-02

## 2019-12-02 DIAGNOSIS — Z71.89 OTHER SPECIFIED COUNSELING: ICD-10-CM

## 2019-12-02 NOTE — PROGRESS NOTES
Clinic Care Coordination Contact    Situation: Patient chart reviewed by care coordinator.    Multiple attempts to reach patient and phone is always busy.     Background: 29 year old female seen in [facility] ED on 11/28/19    Assessment: patient was seen in Ed and diagnosed with upper respiratory tract infection. She was given antibiotic and prednisone.      Plan/Recommendations: Will do no further attempts to reach patient. Care coordination is not indicated.      Tami Lux RN, [facility] RN Coordinator  [facility]   12/2/2019    10:30 AM  [phone]
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
## 2020-01-03 ENCOUNTER — ALLIED HEALTH/NURSE VISIT (OUTPATIENT)
Dept: OBGYN | Facility: CLINIC | Age: 30
End: 2020-01-03

## 2020-01-03 DIAGNOSIS — N94.6 DYSMENORRHEA: ICD-10-CM

## 2020-01-03 DIAGNOSIS — Z78.9 USES BIRTH CONTROL: ICD-10-CM

## 2020-01-03 PROCEDURE — 96372 THER/PROPH/DIAG INJ SC/IM: CPT

## 2020-01-03 PROCEDURE — 99207 ZZC NO CHARGE NURSE ONLY: CPT

## 2020-01-03 NOTE — PROGRESS NOTES
Clinic Administered Medication Documentation    MEDICATION LIST:   Depo Provera Documentation    Prior to injection, verified patient identity using patient's name and date of birth. Medication was administered. Please see MAR and medication order for additional information. Patient instructed to remain in clinic for 15 minutes.    BP: Data Unavailable    LAST PAP/EXAM:   Lab Results   Component Value Date    PAP NIL 07/09/2019     URINE HCG:not indicated    NEXT INJECTION DUE: 3/20/20 - 4/3/20    Was entire vial of medication used? Yes  Vial/Syringe: Single dose vial  Expiration Date:  1/2021

## 2020-01-06 ENCOUNTER — OFFICE VISIT (OUTPATIENT)
Dept: OBGYN | Facility: CLINIC | Age: 30
End: 2020-01-06

## 2020-01-06 VITALS
DIASTOLIC BLOOD PRESSURE: 92 MMHG | RESPIRATION RATE: 16 BRPM | SYSTOLIC BLOOD PRESSURE: 141 MMHG | BODY MASS INDEX: 20.87 KG/M2 | WEIGHT: 117.8 LBS | HEART RATE: 100 BPM | HEIGHT: 63 IN | TEMPERATURE: 97.7 F

## 2020-01-06 DIAGNOSIS — Z11.3 SCREEN FOR STD (SEXUALLY TRANSMITTED DISEASE): Primary | ICD-10-CM

## 2020-01-06 PROCEDURE — 87591 N.GONORRHOEAE DNA AMP PROB: CPT | Performed by: ADVANCED PRACTICE MIDWIFE

## 2020-01-06 PROCEDURE — 87491 CHLMYD TRACH DNA AMP PROBE: CPT | Performed by: ADVANCED PRACTICE MIDWIFE

## 2020-01-06 PROCEDURE — 99213 OFFICE O/P EST LOW 20 MIN: CPT | Performed by: ADVANCED PRACTICE MIDWIFE

## 2020-01-06 ASSESSMENT — MIFFLIN-ST. JEOR: SCORE: 1238.47

## 2020-01-06 NOTE — PROGRESS NOTES
S:  Here for for STI testing.  Had a new partner recently.  She uses depo for contraception.  She has no history of hypertension.  She is coughing a lot as she gets over influenza.    O: BP recheck 125/75   Normal pelvic exam no irritation or abnormal discharge noted  GC/CT collected   A:  STI Testing  P:  Discussed options for STI testing.  She would just like GC/CT at this time.  Discussed that she should have syphilis and HIV if GC or CT is positive.    Reviewed safe sex.    Return to clinic when due for depo or as needed.        20 minutes was spent face to face with the patient today discussing her history, diagnosis, and follow-up plan as noted above. Over 50% of the visit was spent in counseling and coordination of care.    Total Visit Time: 20 minutes.

## 2020-01-06 NOTE — NURSING NOTE
"Initial BP (!) 141/92 (BP Location: Right arm, Patient Position: Chair, Cuff Size: Adult Small)   Pulse 100   Temp 97.7  F (36.5  C) (Tympanic)   Resp 16   Ht 1.6 m (5' 3\")   Wt 53.4 kg (117 lb 12.8 oz)   BMI 20.87 kg/m   Estimated body mass index is 20.87 kg/m  as calculated from the following:    Height as of this encounter: 1.6 m (5' 3\").    Weight as of this encounter: 53.4 kg (117 lb 12.8 oz). .      "

## 2020-03-27 ENCOUNTER — TELEPHONE (OUTPATIENT)
Dept: OBGYN | Facility: CLINIC | Age: 30
End: 2020-03-27

## 2020-03-27 DIAGNOSIS — Z30.011 ENCOUNTER FOR INITIAL PRESCRIPTION OF CONTRACEPTIVE PILLS: Primary | ICD-10-CM

## 2020-03-27 RX ORDER — LEVONORGESTREL/ETHIN.ESTRADIOL 0.1-0.02MG
1 TABLET ORAL DAILY
Qty: 28 TABLET | Refills: 3 | Status: SHIPPED | OUTPATIENT
Start: 2020-03-27 | End: 2020-05-26

## 2020-03-27 NOTE — TELEPHONE ENCOUNTER
Return call to patient.  Spoke with patient on the phone.    Patient reports unable to come for Depo-provera injection before it is overdue. Patient reports some cramping and bleeding already. Patient is quarantine with daughter due to daughters cough.    Patient asking if she could get one month of oral pills until she can get to clinic for the Depo-Provera again. Patient will have some one  from pharmacy and drop on her door step.    Please review and advise.  Thank you.    Angy Aguilar   [facility]  RN

## 2020-03-27 NOTE — TELEPHONE ENCOUNTER
Reason for call:  Patient reporting a symptom    Symptom or request: Pt calling - states she is going to be missing her Depo Provera injection because her daughter has been very ill so unable to come into the clinic with COVID-19 precautions.    Duration (how long have symptoms been present): Due to have it by the end of March    Have you been treated for this before? No    Additional comments: Pt concerned what can happen for missing this?    Phone Number patient can be reached at:  Home number on file [phone] (home)    Best Time:      Can we leave a detailed message on this number:  YES    Call taken on 3/27/2020 at 8:21 AM by Jacque eRbolledo

## 2020-04-30 ENCOUNTER — VIRTUAL VISIT (OUTPATIENT)
Dept: FAMILY MEDICINE | Facility: OTHER | Age: 30
End: 2020-04-30

## 2020-05-01 NOTE — PROGRESS NOTES
"Date: 2020 19:50:25  Clinician: Sandra Queen  Clinician NPI: [id number]  Patient: Michelle Torres  Patient : 1990  Patient Address: [address], [place] [address]  Patient Phone: [phone]  Visit Protocol: URI  Patient Summary:  Michelle is a 29 year old ( : 1990 ) female who initiated a Visit for cold, sinus infection, or influenza. When asked the question \"Please sign me up to receive news, health information and promotions. \", Michelle responded \"No\".    Michelle states her symptoms started gradually 3-4 days ago.   Her symptoms consist of facial pain or pressure, tooth pain, and a headache.   Symptom details     Facial pain or pressure: The facial pain or pressure does not feel worse when bending or leaning forward.     Headache: She states the headache is moderate (4-6 on a 10 point pain scale).     Tooth pain: The tooth pain is caused by a cavity, recent dental work, or other mouth problems.      Michelle denies having malaise, fever, myalgias, rhinitis, sore throat, cough, nasal congestion, vomiting, nausea, ageusia, anosmia, diarrhea, ear pain, wheezing, and chills. She also denies taking antibiotic medication for the symptoms, double sickening (worsening symptoms after initial improvement), and having recent facial or sinus surgery in the past 60 days. She is not experiencing dyspnea.    Pertinent COVID-19 (Coronavirus) information  Michelle does not work or volunteer as healthcare worker or a  and does not work or volunteer in a healthcare facility.   She does not live with a healthcare worker.   Michelle has not had a close contact with a laboratory-confirmed COVID-19 patient within 14 days of symptom onset. She also has not had a close contact with a suspected COVID-19 patient within 14 days of symptom onset.   Pertinent medical history  Michelle typically gets a yeast infection when she takes antibiotics. She has used fluconazole (Diflucan) to treat previous " yeast infections. 2 doses of fluconazole (Diflucan) has typically been needed for symptoms to resolve in the past.  Michelle does not need a return to work/school note.   Weight: 122 lbs   Michelle smokes or uses smokeless tobacco.   She denies pregnancy and denies breastfeeding. She does not menstruate.   Weight: 122 lbs    MEDICATIONS: Advil oral, ALLERGIES: NKDA  Clinician Response:  Dear Michelle,  Based on the information provided, you have a viral upper respiratory infection, otherwise known as a cold. Symptoms vary from person to person, but can include sneezing, coughing, a runny nose, sore throat, and headache and range from mild to severe.  Unfortunately, there are no medications that can cure a cold, so treatment is focused on controlling symptoms as much as possible. Most people gradually feel better until symptoms are gone in 1-2 weeks.  Medication information  Because you have a viral infection, antibiotics will not help you get better. Treating a viral infection with antibiotics could actually make you feel worse.  For more information on why I am not prescribing antibiotics, please watch this video: Antibiotics Aren't Always the Answer.  Unless you are allergic to the over-the-counter medication(s) below, I recommend using:       Acetaminophen (Tylenol or store brand) oral tablet. Take 1-2 tablets by mouth every 4-6 hours to help with the discomfort.      Ibuprofen (Advil or store brand) 200 mg oral tablet. Take 1-3 tablets (200-600 mg) by mouth every 8 hours to help with the discomfort. Make sure to take the ibuprofen with food. Do not exceed 2400 mg in 24 hours.    A decongestant such as Sudafed PE or store brand.      Saline nasal spray or drops(Ocean or store brand). Use 1-2 drops or sprays in each nostril as needed for congestion.    A sinus irrigation kit such as Sinus Rinse, Neti Pot, SinuCleanse, or store brand. Be sure to use sterile or previously boiled water to prevent unwanted infections.       Oxymetazoline (Afrin or store brand) nasal spray. Use 1 spray in each nostril 2 times a day for a maximum of 3 days. Using this medication more frequently or longer than recommended may cause nasal congestion to reoccur or worsen. Sinus pressure occurs when the tissues lining your sinuses become swollen and inflamed. Afrin nasal spray decreases the swelling to provide the quickest and most effective relief from sinus pressure.      Over-the-counter medications do not require a prescription. Ask the pharmacist if you have any questions.  Self care  Steps you can take to be as comfortable as possible:     Rest.    Drink plenty of fluids.     Also, as your provider, I need you to know that becoming tobacco-free is the most important thing you can do to protect your current and future health.  When to seek care  Call your dentist if you suspect your tooth pain is a dental problem.  Please be seen in a clinic or urgent care if any of the following occur:   New symptoms develop, or symptoms become worse   Call ahead before going to the clinic or urgent care.  Additional treatment plan    Your symptoms show that you may have coronavirus (COVID-19)- this is a specific viral illness. This illness can cause fever, cough and trouble breathing. Many people get a mild case and get better on their own. Some people can get very sick.   Will I be tested for COVID-19?  Because we have limited testing supplies we are not testing everyone if they are low risk. We are testing if:   You are very ill. For example, you're on chemotherapy, dialysis or home hospice care. (Contact your specialty clinic or program.)   You live in a nursing home or other long-term care facility. (Talk to your nurse manager or medical director.)   You're a health care worker. ([facility] employees Contact our employee health office for testing.)   We are performing limited curbside testing for healthcare/first responders and people with medical  problems that put them at increased risk. It does not appear by the [facility] information you submitted that you meet any of these criteria. If there are medical problems that we did not know about, please repeat an [facility] visit and let us know what medical conditions you have.   How can I protect others?  Without a test, we can't know for sure that you have COVID-19. For safety, it's very important to follow these rules.  First, stay home and away from others (self-isolate) until:   You've had no fever---and no medicine that reduces fever---for 3 full days (72 hours). And...    Your other symptoms have gotten better. For example, your cough or breathing has improved. And...   At least 7 days have passed since your symptoms started.   During this time:   Don't go to work, school or anywhere else.    Stay away from others in your home. No hugging, kissing or shaking hands.   Don't let anyone visit.   Cover your mouth and nose with a mask, tissue or wash cloth to avoid spreading germs.   Wash your hands and face often. Use soap and water.   How can I take care of myself?  1.Take Tylenol (acetaminophen) for fever or pain. If you have liver or kidney problems, ask your family doctor if it's okay to take Tylenol.   Adults can take either:    650 mg (two 325 mg pills) every 4 to 6 hours, or...   1,000 mg (two 500 mg pills) every 8 hours as needed.    Note: Don't take more than 3,000 mg in one day.  For children, check the Tylenol bottle for the right dose. The dose is based on the child's age or weight.   2.If you have other health problems (like cancer, heart failure, an organ transplant or severe kidney disease): Call your specialty clinic if you don't feel better in the next 2 days.  3.Know when to call 911: If your breathing is so bad that it keeps you from doing normal activities, call 911 or go to the emergency room. Tell them that you've been staying home and may have COVID-19.  4.Sign up for [facility] [facility]. We know  it's scary to hear that you might have COVID-19. We want to track your symptoms to make sure you're okay over the next 2 weeks. Please look for an email from [employer] Michelle---this is a free, online program that we'll use to keep in touch. To sign up, follow the link in the email. Learn more at [email or web address].  Where can I get more information?  To learn more about COVID-19 and how to care for yourself at home, please visit the CDC website at https://www.cdc.gov/coronavirus/2019-ncov/about/steps-when-sick.html.  For more options for care at [facility], please visit our website at [email or web address]   If you are interested in becoming part of a [facility] clinic trial related to COVID19 please go to [email or web address] for information, if you qualify.         Diagnosis: Acute upper respiratory infection, unspecified  Diagnosis ICD: J06.9

## 2020-05-01 NOTE — PROGRESS NOTES
"Date: 2020 20:11:36  Clinician: Sandra Queen  Clinician NPI: [id number]  Patient: Michelle Torres  Patient : 1990  Patient Address: [address] [place] [address] [address], [place] [address]  Patient Phone: [phone]  Visit Protocol: URI  Patient Summary:  Michelle is a 29 year old ( : 1990 ) female who initiated a Visit for cold, sinus infection, or influenza. When asked the question \"Please sign me up to receive news, health information and promotions. \", Michelle responded \"No\".    Michelle states her symptoms started gradually 3-4 days ago.   Her symptoms consist of facial pain or pressure, tooth pain, ear pain, and a headache.   Symptom details     Facial pain or pressure: The facial pain or pressure does not feel worse when bending or leaning forward.     Headache: She states the headache is moderate (4-6 on a 10 point pain scale).     Tooth pain: The tooth pain is caused by a cavity, recent dental work, or other mouth problems.      Michelle denies having malaise, fever, myalgias, rhinitis, sore throat, cough, nasal congestion, vomiting, nausea, ageusia, anosmia, diarrhea, wheezing, and chills. She also denies taking antibiotic medication for the symptoms, double sickening (worsening symptoms after initial improvement), and having recent facial or sinus surgery in the past 60 days. She is not experiencing dyspnea.    Pertinent COVID-19 (Coronavirus) information  Michelle does not work or volunteer as healthcare worker or a  and does not work or volunteer in a healthcare facility.   She does not live with a healthcare worker.   Michelle has not had a close contact with a laboratory-confirmed COVID-19 patient within 14 days of symptom onset. She also has not had a close contact with a suspected COVID-19 patient within 14 days of symptom onset.   Pertinent medical history  Michelle typically gets a yeast infection when she takes antibiotics. She has used fluconazole (Diflucan) to treat previous " yeast infections. 2 doses of fluconazole (Diflucan) has typically been needed for symptoms to resolve in the past.  Michelle does not need a return to work/school note.   Weight: 122 lbs   Michelle smokes or uses smokeless tobacco.   She denies pregnancy and denies breastfeeding. She does not menstruate.   Additional information as reported by the patient (free text): I have a infected wisdom tooth and can't be seen at the dentist due to covid for almost two weeks and they suggested urgent care online visit to treat infection since I'm not a established patient with them.   Weight: 122 lbs    MEDICATIONS: Advil oral, ALLERGIES: NKDA  Clinician Response:  Dear Michelle,   I am sorry to hear about your infected tooth. Unfortunately [facility] is not an appropriate venue to treat you. If the dentist will not see you, then I would recommend being seen in person at an Urgent Care. If you call: [facility] ([phone]), a  can get you scheduled at an Urgent Care.&nbsp;  I hope you feel better soon.    Diagnosis: Other specified disorders of teeth and supporting structures  Diagnosis ICD: K08.89

## 2020-05-26 ENCOUNTER — OFFICE VISIT (OUTPATIENT)
Dept: FAMILY MEDICINE | Facility: CLINIC | Age: 30
End: 2020-05-26
Payer: MEDICAID

## 2020-05-26 ENCOUNTER — ALLIED HEALTH/NURSE VISIT (OUTPATIENT)
Dept: OBGYN | Facility: CLINIC | Age: 30
End: 2020-05-26
Payer: MEDICAID

## 2020-05-26 VITALS
SYSTOLIC BLOOD PRESSURE: 126 MMHG | TEMPERATURE: 98 F | BODY MASS INDEX: 20.9 KG/M2 | OXYGEN SATURATION: 98 % | DIASTOLIC BLOOD PRESSURE: 80 MMHG | RESPIRATION RATE: 16 BRPM | HEART RATE: 101 BPM | WEIGHT: 118 LBS

## 2020-05-26 VITALS
WEIGHT: 120 LBS | DIASTOLIC BLOOD PRESSURE: 88 MMHG | TEMPERATURE: 98.6 F | HEART RATE: 107 BPM | SYSTOLIC BLOOD PRESSURE: 131 MMHG | BODY MASS INDEX: 21.26 KG/M2

## 2020-05-26 DIAGNOSIS — Z30.42 ENCOUNTER FOR SURVEILLANCE OF INJECTABLE CONTRACEPTIVE: ICD-10-CM

## 2020-05-26 DIAGNOSIS — F32.4 MAJOR DEPRESSIVE DISORDER WITH SINGLE EPISODE, IN PARTIAL REMISSION (H): ICD-10-CM

## 2020-05-26 DIAGNOSIS — F41.1 GENERALIZED ANXIETY DISORDER: Primary | ICD-10-CM

## 2020-05-26 DIAGNOSIS — Z30.011 ENCOUNTER FOR INITIAL PRESCRIPTION OF CONTRACEPTIVE PILLS: Primary | ICD-10-CM

## 2020-05-26 LAB — HCG UR QL: NEGATIVE

## 2020-05-26 PROCEDURE — 81025 URINE PREGNANCY TEST: CPT | Performed by: OBSTETRICS & GYNECOLOGY

## 2020-05-26 PROCEDURE — 99207 ZZC NO CHARGE NURSE ONLY: CPT

## 2020-05-26 PROCEDURE — 99214 OFFICE O/P EST MOD 30 MIN: CPT | Performed by: INTERNAL MEDICINE

## 2020-05-26 PROCEDURE — 96372 THER/PROPH/DIAG INJ SC/IM: CPT

## 2020-05-26 RX ORDER — MEDROXYPROGESTERONE ACETATE 150 MG/ML
150 INJECTION, SUSPENSION INTRAMUSCULAR
COMMUNITY
End: 2021-07-15

## 2020-05-26 RX ORDER — MEDROXYPROGESTERONE ACETATE 150 MG/ML
150 INJECTION, SUSPENSION INTRAMUSCULAR
Status: DISCONTINUED | OUTPATIENT
Start: 2020-05-26 | End: 2022-04-12

## 2020-05-26 RX ORDER — ESCITALOPRAM OXALATE 5 MG/1
5 TABLET ORAL DAILY
Qty: 90 TABLET | Refills: 3 | Status: SHIPPED | OUTPATIENT
Start: 2020-05-26 | End: 2022-02-24

## 2020-05-26 RX ORDER — MEDROXYPROGESTERONE ACETATE 150 MG/ML
150 INJECTION, SUSPENSION INTRAMUSCULAR
Status: ACTIVE | OUTPATIENT
Start: 2020-05-26 | End: 2021-05-21

## 2020-05-26 RX ADMIN — MEDROXYPROGESTERONE ACETATE 150 MG: 150 INJECTION, SUSPENSION INTRAMUSCULAR at 11:30

## 2020-05-26 ASSESSMENT — PATIENT HEALTH QUESTIONNAIRE - PHQ9
SUM OF ALL RESPONSES TO PHQ QUESTIONS 1-9: 7
5. POOR APPETITE OR OVEREATING: NEARLY EVERY DAY

## 2020-05-26 ASSESSMENT — ANXIETY QUESTIONNAIRES
6. BECOMING EASILY ANNOYED OR IRRITABLE: MORE THAN HALF THE DAYS
2. NOT BEING ABLE TO STOP OR CONTROL WORRYING: NEARLY EVERY DAY
3. WORRYING TOO MUCH ABOUT DIFFERENT THINGS: MORE THAN HALF THE DAYS
GAD7 TOTAL SCORE: 15
1. FEELING NERVOUS, ANXIOUS, OR ON EDGE: MORE THAN HALF THE DAYS
5. BEING SO RESTLESS THAT IT IS HARD TO SIT STILL: NOT AT ALL
7. FEELING AFRAID AS IF SOMETHING AWFUL MIGHT HAPPEN: NEARLY EVERY DAY
IF YOU CHECKED OFF ANY PROBLEMS ON THIS QUESTIONNAIRE, HOW DIFFICULT HAVE THESE PROBLEMS MADE IT FOR YOU TO DO YOUR WORK, TAKE CARE OF THINGS AT HOME, OR GET ALONG WITH OTHER PEOPLE: SOMEWHAT DIFFICULT

## 2020-05-26 NOTE — NURSING NOTE
"Initial /88 (BP Location: Right arm, Patient Position: Chair, Cuff Size: Adult Regular)   Pulse 107   Temp 98.6  F (37  C) (Tympanic)   Wt 54.4 kg (120 lb)   LMP  (LMP Unknown)   Breastfeeding No   BMI 21.26 kg/m   Estimated body mass index is 21.26 kg/m  as calculated from the following:    Height as of 1/6/20: 1.6 m (5' 3\").    Weight as of this encounter: 54.4 kg (120 lb). .    Deysi Calhoun MA       Clinic Administered Medication Documentation      Depo Provera Documentation    URINE HCG: negative    Depo-Provera Standing Order inclusion/exclusion criteria reviewed.   Patient meets: inclusion criteria     BP: 131/88  LAST PAP/EXAM:   Lab Results   Component Value Date    PAP NIL 07/09/2019       Prior to injection, verified patient identity using patient's name and date of birth. Medication was administered. Please see MAR and medication order for additional information.     Was entire vial of medication used? Yes  Vial/Syringe: Single dose vial  Expiration Date:  10/2020    Patient instructed to remain in clinic for 15 minutes and report any adverse reaction to staff immediately .  NEXT INJECTION DUE: 8/11/20 - 8/25/20  Left ventrogluteal  Deysi Calhoun MA        "
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
## 2020-05-26 NOTE — PATIENT INSTRUCTIONS
"1. Consider therapy, referral made  2. Start lexapro 5 mg once daily  3. Follow-up in 4 weeks with virtual visit to see how medication is working  4. Renewal of Depo        Relaxation Techniques    Breathing Exercises:    Inhale through your nose counting to \"4\" as you are breathing in  Exhale through your mouth - counting to \"6:\" or more - pursing your lips to slow down the exhalation.  Try to do this exercise 3 times if you are able to -remember even once is going to make a difference.     Beach Ball Exercise:    Imagine you are holding a deflated beach ball with both hands in front of you.  As you inhale -imagine to be trouble filling with air  -until you have experienced a complete inhalation.  As you exhale through your mouth, slightly pursed lips, managing herself squeezing the air out of the beach well.    The following two exercises are from: DBT Skills Training Handouts and Worksheets, 2nd edition.  2015.  Fadiajenniffer Valdez      Half Smile Exercise:    1st: Relax your face from top of her head down to her chin and jaw.  Let go of each facial muscle: Forehead, eyes, brows, cheeks, mouth, tongue - teeth slightly apart  2nd: Left both corners of her lips go slightly up, just so you can feel them.  It is not necessary for others to see it.  A half smile is slightly turned up lips with a relaxed face.  3rd: Adopt a serene facial expression.  Think of the Suzie Sanjuana and here serene face    Willing Hands Exercise:    Notice the positions of your hands - especially if you are feeling some stress  While sitting place your hands on your lap - with hands unclenched - turn your palms up with fingers relaxed            Self Care Tips    1. What am I grateful for today?  2. Who am I checking in with or connecting with today?  3. What expectations of 'normal' can I let go of today?  4. Am I getting outside today?  5. How am I moving my body today  6. What beauty am I either creating, cultivating or inviting in " today?

## 2020-05-27 ASSESSMENT — ANXIETY QUESTIONNAIRES: GAD7 TOTAL SCORE: 15

## 2020-06-03 ENCOUNTER — VIRTUAL VISIT (OUTPATIENT)
Dept: FAMILY MEDICINE | Facility: CLINIC | Age: 30
End: 2020-06-03
Payer: COMMERCIAL

## 2020-06-03 DIAGNOSIS — B37.9 ANTIBIOTIC-INDUCED YEAST INFECTION: ICD-10-CM

## 2020-06-03 DIAGNOSIS — J02.9 SORE THROAT: Primary | ICD-10-CM

## 2020-06-03 DIAGNOSIS — T36.95XA ANTIBIOTIC-INDUCED YEAST INFECTION: ICD-10-CM

## 2020-06-03 PROCEDURE — 99213 OFFICE O/P EST LOW 20 MIN: CPT | Mod: 95 | Performed by: FAMILY MEDICINE

## 2020-06-03 RX ORDER — FLUCONAZOLE 150 MG/1
150 TABLET ORAL ONCE
Qty: 1 TABLET | Refills: 0 | Status: SHIPPED | OUTPATIENT
Start: 2020-06-03 | End: 2020-08-10

## 2020-06-03 RX ORDER — AMOXICILLIN 400 MG/5ML
500 POWDER, FOR SUSPENSION ORAL 2 TIMES DAILY
Qty: 126 ML | Refills: 0 | Status: SHIPPED | OUTPATIENT
Start: 2020-06-03 | End: 2020-08-10

## 2020-06-03 NOTE — PROGRESS NOTES
"Michelle Torres is a 29 year old female who is being evaluated via a billable telephone visit.      The patient has been notified of following:     \"This telephone visit will be conducted via a call between you and your physician/provider. We have found that certain health care needs can be provided without the need for a physical exam.  This service lets us provide the care you need with a short phone conversation.  If a prescription is necessary we can send it directly to your pharmacy.  If lab work is needed we can place an order for that and you can then stop by our lab to have the test done at a later time.    Telephone visits are billed at different rates depending on your insurance coverage. During this emergency period, for some insurers they may be billed the same as an in-person visit.  Please reach out to your insurance provider with any questions.    If during the course of the call the physician/provider feels a telephone visit is not appropriate, you will not be charged for this service.\"    Patient has given verbal consent for Telephone visit?  Yes    What phone number would you like to be contacted at? [phone]    How would you like to obtain your AVS? Mail a copy. Email if possible.    Subjective     Michelle Torres is a 29 year old female who presents via phone visit today for the following health issues:    HPI  Acute Illness   Acute illness concerns: Throat pain.   Onset: x 1 day    Fever: no- very low grade 100.2    Chills/Sweats: no    Headache (location?): YES- little bit    Sinus Pressure:no    Conjunctivitis:  no    Ear Pain: no    Rhinorrhea: no    Congestion: no    Sore Throat: YES- white spots on throat and very swollen     Cough: no    Wheeze: no    Decreased Appetite: no    Nausea: no    Vomiting: no    Diarrhea:  no    Dysuria/Freq.: no    Fatigue/Achiness: YES- body aches    Sick/Strep Exposure: not sure     Therapies Tried and outcome: ibuprofen- subsided the headache     Has very " large tonsils.  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  Yesterday afternoon at 3p sore throat and headache.  Throat with white dots and right lymph node on neck enlarged. Mild headache.        Reviewed and updated as needed this visit by Provider         Review of Systems   Constitutional, HEENT, cardiovascular, pulmonary, gi and gu systems are negative, except as otherwise noted.       Objective   Reported vitals:  LMP  (LMP Unknown)    healthy, alert and no distress  PSYCH: Alert and oriented times 3; coherent speech, normal   rate and volume, able to articulate logical thoughts, able   to abstract reason, no tangential thoughts, no hallucinations   or delusions  Her affect is normal and pleasant  RESP: No cough, no audible wheezing, able to talk in full sentences, voice a little hoarse.  Remainder of exam unable to be completed due to telephone visits    Diagnostic Test Results:  Labs reviewed in Epic        Assessment/Plan:  1. Sore throat: presume strep.  Unable to come in as is single parent and unable to bring kids in with her due to COVID for strep testing.  Plan to treat for presumed strep. Throat is so sore that patient would like liquid.  Discussed if improved in 24-48 hours then may return to work Monday, if not improved then call and plan COVID and strep testing.  - amoxicillin (AMOXIL) 400 MG/5ML suspension; Take 6.3 mLs (500 mg) by mouth 2 times daily for 10 days  Dispense: 126 mL; Refill: 0    2. Antibiotic-induced yeast infection    - fluconazole (DIFLUCAN) 150 MG tablet; Take 1 tablet (150 mg) by mouth once for 1 dose  Dispense: 1 tablet; Refill: 0    No follow-ups on file.      Phone call duration:  13 minutes    Gregory Akers MD

## 2020-06-03 NOTE — PROGRESS NOTES
"Date: 2020 15:49:06  Clinician: Juan Colbert  Clinician NPI: [id number]  Patient: Michelle Torres  Patient : 1990  Patient Address: [address], [place] [address]  Patient Phone: [phone]  Visit Protocol: URI  Patient Summary:  Michelle is a 29 year old ( : 1990 ) female who initiated a Visit for cold, sinus infection, or influenza. When asked the question \"Please sign me up to receive news, health information and promotions. \", Michelle responded \"No\".    Michelle states her symptoms started today.   Her symptoms consist of a sore throat, enlarged lymph nodes, malaise, and myalgia.   Symptom details   Sore throat: Michelle reports having mild throat pain (1-3 on a 10 point pain scale), has exudate on her tonsils, and can swallow liquids. The lymph nodes in her neck are enlarged. A rash has not appeared on the skin since the sore throat started.    Michelle denies having wheezing, nausea, teeth pain, ageusia, diarrhea, anosmia, facial pain or pressure, fever, cough, nasal congestion, vomiting, rhinitis, ear pain, headache, and chills. She also denies having recent facial or sinus surgery in the past 60 days and taking antibiotic medication for the symptoms. She is not experiencing dyspnea.   Precipitating events  Michelle is not sure if she has been exposed to someone with strep throat. She has not recently been exposed to someone with influenza. Michelle has been in close contact with the following high risk individuals: children under the age of 5 and people with asthma, heart disease or diabetes.   Pertinent COVID-19 (Coronavirus) information  In the past 14 days, Michelle has not worked in a congregate living setting.   She does not work or volunteer as healthcare worker or a  and does not work or volunteer in a healthcare facility.   Michelle also has not lived in a congregate living setting in the past 14 days. She does not live with a healthcare worker.   Michelle has not had " a close contact with a laboratory-confirmed COVID-19 patient within 14 days of symptom onset.   Triage Point(s) temporarily suspended for COVID-19 (Coronavirus) screening  Michelle reported the following symptoms which were previously protocol referral points. These protocol referral points have temporarily been removed for purposes of COVID-19 (Coronavirus) screening.   Meets at least 3/5 centor score criteria     Swollen lymph nodes    Exudate on tonsils    Absence of cough         Pertinent medical history  Michelle typically gets a yeast infection when she takes antibiotics. She has used fluconazole (Diflucan) to treat previous yeast infections. 2 doses of fluconazole (Diflucan) has typically been needed for symptoms to resolve in the past.  Michelle does not need a return to work/school note.   Weight: 122 lbs   Michelle smokes or uses smokeless tobacco.   She denies pregnancy and denies breastfeeding. She does not menstruate.   Weight: 122 lbs    MEDICATIONS: Advil oral, ALLERGIES: NKDA  Clinician Response:  Dear Michelle,      Your symptoms show that you may have coronavirus (COVID-19). This illness can cause fever, cough and trouble breathing. Many people get a mild case and get better on their own. Some people can get very sick.  What should I do?  We would like to test you for this virus. This will be a curbside test done outside the clinic.  Please call [phone] to schedule your visit. Explain that [facility] were [facility] have [facility] COVID-19 test. Be ready to share your [facility] visit ID number.  Starting now:  Stay at least 6 feet away from others. (If someone will drive you to your test, stay in the backseat, as far away from the  as you can.)   Don't go to work, school or anywhere else. When it's time for your test, go straight to the testing site. Don't make any stops on the way there or back.   Wash your hands and face often. Use soap and water.   Cover your mouth and nose with a mask, tissue or  "washcloth.   Don't touch anyone. No hugging, kissing or handshakes.  While at home   Stay home and away from others (self-isolate) until:  You've had no fever---and no medicine that reduces fever---for 3 full days (72 hours). And...  Your other symptoms have gotten better. For example, your cough or breathing has improved. And...  At least 10 days have passed since your symptoms started.  During this time:  Stay in your own room (and use your own bathroom), if you can.  Don't go to work, school or anywhere else.  Stay away from others in your home. No hugging, kissing or shaking hands.  Don't let anyone visit.  Cover your mouth and nose with a mask, tissue or washcloth to avoid spreading germs.  Clean \"high touch\" surfaces often (doorknobs, counters, handles, etc.). Use a household cleaning spray or wipes.  Wash your hands and face often. Use soap and water.  How can I take care of myself?  1. Get lots of rest. Drink extra fluids (unless your doctor has told you not to).  2. Take Tylenol (acetaminophen) for fever or pain. If you have liver or kidney problems, ask your family doctor if it's okay to take Tylenol.  Adults can take either:   650 mg (two 325 mg pills) every 4 to 6 hours, or...  1,000 mg (two 500 mg pills) every 8 hours as needed.   Note: Don't take more than 3,000 mg in one day.   Acetaminophen is found in many medicines (both prescribed and over-the-counter medicines). Read all labels to be sure you don't take too much.   For children, check the Tylenol bottle for the right dose. The dose is based on the child's age or weight.  3. If you have other health problems (like cancer, heart failure, an organ transplant or severe kidney disease): Call your specialty clinic if you don't feel better in the next 2 days.  4. Know when to call 911: If your breathing is so bad that it keeps you from doing normal activities, call 911 or go to the emergency room. Tell them that you've been staying home and may have " COVID-19.  5. Sign up for [employer]. We know it's scary to hear that you might have COVID-19. We want to track your symptoms to make sure you're okay over the next 2 weeks. Please look for an email from [employer]---this is a free, online program that we'll use to keep in touch. To sign up, follow the link in the email. Learn more at [email or web address].  6. The following will serve as your written order for this Covid Test ordered by me for the indication of suspected Covid [Z20.828]: The test will be ordered in [employer] [facility], our electronic health record after you are scheduled and will show as ordered and authorized by Erick Hollingsworth MD   Order: Covid-19 (Coronavirus) PCR for SYMPTOMATIC testing from [facility]  Where can I get more information?  To learn more about COVID-19 and how to care for yourself at home, please visit the CDC website at https://www.cdc.gov/coronavirus/2019-ncov/about/steps-when-sick.html.  For more about your care at [facility], please visit [email or web address]  If you'd like to be part of a COVID-19 clinical trial (research study) at the [facility], go to [email or web address] for details.       [facility] Strep Test    I am sorry you are not feeling well. Your strep test has . Based on the information provided, it is possible that you could have strep throat. When left untreated, strep can spread to other areas of the body and cause a more serious infection.  A strep test is the only way to determine if a bacterial infection or a virus is causing your sore throat. This is done in a lab where a swab of the back of your throat is collected tested for the bacteria that causes strep.  Since you chose not to use the lab order, please be seen:     In a clinic or urgent care    Within 24 hours     Call 911 or go to the emergency room if you suddenly develop a rash, are drooling or unable to swallow fluids, if you are  having difficulty breathing, or feel that your throat is closing off.   Diagnosis: Pain in throat  Diagnosis ICD: R07.0  [facility]Ticket Results: [facility] Strep Test -   [facility]Ticket Secondary Results: null

## 2020-06-03 NOTE — PATIENT INSTRUCTIONS
Patient Education     Self-Care for Sore Throats    Sore throats happen for many reasons, such as colds, allergies, and infections caused by viruses or bacteria. In any case, your throat becomes red and sore. Your goal for self-care is to reduce your discomfort while giving your throat a chance to heal.  Moisten and soothe your throat  Tips include the following:    Try a sip of water first thing after waking up.    Keep your throat moist by drinking 6 or more glasses of clear liquids every day.    Run a cool-air humidifier in your room overnight.    Avoid cigarette smoke.     Suck on throat lozenges, cough drops, hard candy, ice chips, or frozen fruit-juice bars. Use the sugar-free versions if your diet or medical condition requires them.  Gargle to ease irritation  Gargling every hour or 2 can ease irritation. Try gargling with 1 of these solutions:    1/4 teaspoon of salt in 1/2 cup of warm water    An over-the-counter anesthetic gargle  Use medicine for more relief  Over-the-counter medicine can reduce sore throat symptoms. Ask your pharmacist if you have questions about which medicine to use:    Ease pain with anesthetic sprays. Aspirin or an aspirin substitute also helps. Remember, never give aspirin to anyone 18 or younger, or if you are already taking blood thinners.     For sore throats caused by allergies, try antihistamines to block the allergic reaction.    Remember: unless a sore throat is caused by a bacterial infection, antibiotics won t help you.  Prevent future sore throats  Prevention tips include the following:    Stop smoking or reduce contact with secondhand smoke. Smoke irritates the tender throat lining.    Limit contact with pets and with allergy-causing substances, such as pollen and mold.    When you re around someone with a sore throat or cold, wash your hands often to keep viruses or bacteria from spreading.    Don t strain your vocal cords.  Contact your healthcare provider if you  have:    A temperature over 101 F (38.3 C)    White spots on the throat    Great difficulty swallowing    Trouble breathing    A skin rash    Recent exposure to someone else with strep bacteria    Severe hoarseness and swollen glands in the neck or jaw  Date Last Reviewed: 8/1/2016 2000-2019 [employer]. [address] [place] [address]. All rights reserved. This information is not intended as a substitute for professional medical care. Always follow your healthcare professional's instructions.           Patient Education     Pharyngitis: Strep (Presumed)    You have pharyngitis (sore throat). The healthcare staff think your sore throat is caused by streptococcus (strep) bacteria. This is often called strep throat. Strep throat can cause throat pain that is worse when swallowing, aching all over, headache, and fever. The infection is contagious. It may be spread by coughing, kissing, or touching others after touching your mouth or nose. Antibiotic medicine is given to treat the infection.  Home care    Rest at home. Drink plenty of fluids so you won t get dehydrated.    Stay home from work or school for the first 2 days of taking the antibiotics. After this time, you will not be contagious. You can then return to work or school if you are feeling better.     Take the antibiotic medicine for the full 10 days, even when you feel better. This is very important to make sure the infection is fully treated. It is also important to prevent medicine-resistant germs from growing. If you were given an antibiotic shot, no more antibiotics are needed.    You may use acetaminophen or ibuprofen to control pain or fever, unless another medicine was prescribed for this. If you have chronic liver or kidney disease or ever had a stomach ulcer or GI bleeding, talk with your healthcare provider before using these medicines.    Use throat lozenges or a throat-numbing spray to help reduce throat pain. Gargling  with warm salt water can also help reduce throat pain. Dissolve 1/2 teaspoon of salt in 1 glass of warm water.     Don t eat salty or spicy foods. These can irritate the throat.  Follow-up care  Follow up with your healthcare provider or our staff if you don't get better over the next week.  When to seek medical advice  Call your healthcare provider right away if any of these occur:    Fever as directed by your healthcare provider    New or worse ear pain, sinus pain, or headache    Painful lumps in the back of neck    Stiff neck    Lymph nodes that get larger    Can t swallow liquids, a lot of drooling, or can t open mouth wide due to throat pain    Signs of dehydration, such as very dark urine or no urine, sunken eyes, dizziness    Trouble breathing or noisy breathing    Muffled voice    New rash  Prevention  Here are steps you can take to help prevent an infection:    Keep good hand washing habits.    Don t have close contact with people who have sore throats, colds, or other upper respiratory infections.    Don t smoke, and stay away from secondhand smoke.    Stay up to date with of your vaccines.  Date Last Reviewed: 11/1/2017 2000-2019 [employer]. [address] [place] [address], [place] [address]. All rights reserved. This information is not intended as a substitute for professional medical care. Always follow your healthcare professional's instructions.

## 2020-06-03 NOTE — LETTER
[facility]  [address] [address]  Phone: [phone]    Shayna 3, 2020        Michelle Torres  [address]  [facility] [address]          To whom it may concern:    RE: Michelle Torres    Patient was seen and treated and missed work.  We are treating for presumed strep throat. If with treatment, she is improved after 24-48 hours then she may return to work Monday June 8, 2020. If not improving then she will come into clinic for testing.    Please contact me for questions or concerns.      Sincerely,        Gregory Akers MD

## 2020-07-23 ENCOUNTER — TELEPHONE (OUTPATIENT)
Dept: FAMILY MEDICINE | Facility: CLINIC | Age: 30
End: 2020-07-23

## 2020-07-23 ENCOUNTER — VIRTUAL VISIT (OUTPATIENT)
Dept: FAMILY MEDICINE | Facility: OTHER | Age: 30
End: 2020-07-23

## 2020-07-23 NOTE — TELEPHONE ENCOUNTER
Patient is calling again, and the ear drops originally ordered are too expensive. She is insisting on Amoxicillin. She is a mother of 2 kids and would have to bring them with in order for her to be seen. I told her we are waiting to hear back on a different drop that could be less expensive. I also told her to get back on [email or web address] and she should be asking them, since that's where it originated from.  [facility]  [facility] [place]

## 2020-07-23 NOTE — TELEPHONE ENCOUNTER
Routed to Dr Garner for consideration.    Per Dann Menendez MD at Pending sale to [facility]:  Prescription: Ciprodex 0.3-0.1 % otic (ear) drops,suspension 1 7.5 ml dropper bottle, 7 days supply. Instill 4 drops into affected ear(s) 2 times per day for 7 days. Refills: 0, Refill as needed: no,     Allow substitutions: yes    Addendum created: July 23 14:41:40, 2020 created by: Dann Menendez body: You may call in Cortisporin with same directions if this is less expensive.    Pt uses [facility] in [place].    Sanjuana Pruitt RN

## 2020-07-23 NOTE — PROGRESS NOTES
"Date: 2020 09:57:07  Clinician: Dann Menendez  Clinician NPI: [id number]  Patient: Michelle Torres  Patient : 1990  Patient Address: [address] [place] [address] [address], [place] [address]  Patient Phone: [phone]  Visit Protocol: Ear pain  Patient Summary:  Michelle is a 30 year old ( : 1990 ) female who initiated a Visit for swimmer's ear (outer ear infection). When asked the question \"Please sign me up to receive news, health information and promotions. \", Michelle responded \"No\".    Michelle reports that her ear pain started 2-4 days ago. The ear pain is located inside the left ear.   In addition to the ear pain, Michelle is experiencing a feeling of fullness and tenderness in the ear(s). Her ear(s) is tender to the touch on the ear canal.   Symptom Details   Pain: Michelle is experiencing moderate pain (4-6 on a 10 point pain scale). It gets worse when she eats or chews. It does not get worse when she gently pulls on the earlobe(s).    Michelle denies redness and itchiness in the ear(s). She also denies recent injuries near the ear(s), having fluid draining from the ear(s), feeling feverish, ever having ear tubes, and the possibility of a foreign object in the ear(s).   Precipitating events   Michelle denies swimming and flying within the past week.   Pertinent medical history   Weight: 120 lbs   She denies pregnancy and denies breastfeeding. She does not menstruate.   She smokes or uses smokeless tobacco.   Weight: 120 lbs    MEDICATIONS: Advil oral, ALLERGIES: NKDA  Clinician Response:  Dear Michelle,   Based upon the information you provided, you may have otitis externa. External otitis, also known as swimmer's ear, is a condition that occurs when the ear canal becomes irritated or infected.   Medication information  I am prescribing:     Ciprodex 0.3-0.1% otic ear drops. Instill 4 drops into affected ear(s) 2 times per day for 7 days. There are no refills with this prescription.   Instructions for using " eardrops:     Lie on your side or tilt your head    Insert the drops into your ear canal    Lie on your side or insert a cotton ball in the ear canal for 5 minutes    Use the medication for the number of days recommended, even if you begin to feel better within a few days after starting the drops     Self care  Avoid getting water inside your ear while you are being treated for swimmer's ear.  Use a cotton ball coated with petroleum jelly to protect your ears when bathing and showering.  Do not go swimming or diving for the next 7-10 days.  Do not wear headphones or hearing aid in the infected ears until your symptoms improve.  Becoming tobacco-free is one of the best things you can do to improve your health. For help with quitting tobacco, you can call 7-297SitScapeQUIT-NOW (1-848.619.7927) or visit [email or web address][employer].com.  When to seek care  Please make an appointment to be seen in a clinic or urgent care if any of the following occur:     Symptoms do not improve within 2 days    New symptoms develop or symptoms becomes worse      Diagnosis: Otitis externa  Diagnosis ICD: H60.399  Prescription: Ciprodex 0.3-0.1 % otic (ear) drops,suspension 1 7.5 ml dropper bottle, 7 days supply. Instill 4 drops into affected ear(s) 2 times per day for 7 days. Refills: 0, Refill as needed: no, Allow substitutions: yes  Addendum created: July 23 15:26:42, 2020 created by: Dann bradley: Then this is not otitis externa, Please have patient go to urgent care for evaluation.  Addendum created: July 23 14:41:40, 2020 created by: Dann bradley: You may call in Cortisporin with same directions if this is less expensive.  Addendum created: July 23 14:41:11, 2020 created by: Dann bradley: Amoxicillin would not work for this, you need ear drops.

## 2020-07-23 NOTE — TELEPHONE ENCOUNTER
"Appears this is already addressed by the [facility] provider    \"Addendum created: July 23 15:26:42, 2020 created by: Dann bradley: Then this is not otitis externa, Please have patient go to urgent care for evaluation.  Addendum created: July 23 14:41:40, 2020 created by: Dann bradley: You may call in Cortisporin with same directions if this is less expensive.  Addendum created: July 23 14:41:11, 2020 created by: Dann bradley: Amoxicillin would not work for this, you need ear drops.\"  "

## 2020-07-23 NOTE — TELEPHONE ENCOUNTER
Pt notified.  She will keep working with [facility] regarding getting an alternative sent to pharmacy.    Sanjuana Pruitt RN

## 2020-07-23 NOTE — TELEPHONE ENCOUNTER
Pt should contact [facility] again as directed below.    Routed to Dr Garner if pt is unable to get a reply from [facility].    Sanjuana Pruitt RN

## 2020-07-23 NOTE — TELEPHONE ENCOUNTER
Reason for Call:  Medication or l:    Do you use a [facility]?  Name of the pharmacy and phone number for the current request:  [facility] [place] [phone]    Name of the medication requested: Patient wants liquid amoxicillin. Patient went to [facility], prescribed ear drops for her ear infection she can't afford, wants replacement for expensive antibiotic drops. Patient can't reach [facility]    Can we leave a detailed message on this number? YES    Phone number patient can be reached at: Home number on file [phone] (home)    Best Time: Any    Call taken on 7/23/2020 at 11:22 AM by Angelia Spence

## 2020-07-24 ENCOUNTER — OFFICE VISIT (OUTPATIENT)
Dept: FAMILY MEDICINE | Facility: CLINIC | Age: 30
End: 2020-07-24
Payer: COMMERCIAL

## 2020-07-24 VITALS
OXYGEN SATURATION: 98 % | DIASTOLIC BLOOD PRESSURE: 74 MMHG | SYSTOLIC BLOOD PRESSURE: 118 MMHG | HEART RATE: 102 BPM | HEIGHT: 63 IN | BODY MASS INDEX: 21.72 KG/M2 | WEIGHT: 122.6 LBS | TEMPERATURE: 98.3 F

## 2020-07-24 DIAGNOSIS — H60.391 INFECTIVE OTITIS EXTERNA, RIGHT: ICD-10-CM

## 2020-07-24 DIAGNOSIS — H66.001 NON-RECURRENT ACUTE SUPPURATIVE OTITIS MEDIA OF RIGHT EAR WITHOUT SPONTANEOUS RUPTURE OF TYMPANIC MEMBRANE: Primary | ICD-10-CM

## 2020-07-24 PROCEDURE — 99213 OFFICE O/P EST LOW 20 MIN: CPT | Performed by: NURSE PRACTITIONER

## 2020-07-24 RX ORDER — NEOMYCIN SULFATE, POLYMYXIN B SULFATE AND HYDROCORTISONE 10; 3.5; 1 MG/ML; MG/ML; [USP'U]/ML
3 SUSPENSION/ DROPS AURICULAR (OTIC) 4 TIMES DAILY
Qty: 10 ML | Refills: 0 | Status: SHIPPED | OUTPATIENT
Start: 2020-07-24 | End: 2020-07-24

## 2020-07-24 RX ORDER — IBUPROFEN 400 MG/1
600 TABLET, FILM COATED ORAL EVERY 6 HOURS PRN
COMMUNITY

## 2020-07-24 RX ORDER — AMOXICILLIN 400 MG/5ML
875 POWDER, FOR SUSPENSION ORAL 2 TIMES DAILY
Qty: 218 ML | Refills: 0 | Status: SHIPPED | OUTPATIENT
Start: 2020-07-24 | End: 2020-08-10

## 2020-07-24 RX ORDER — NEOMYCIN SULFATE, POLYMYXIN B SULFATE AND HYDROCORTISONE 10; 3.5; 1 MG/ML; MG/ML; [USP'U]/ML
3 SUSPENSION/ DROPS AURICULAR (OTIC) 4 TIMES DAILY
Qty: 10 ML | Refills: 0 | Status: SHIPPED | OUTPATIENT
Start: 2020-07-24 | End: 2020-12-17

## 2020-07-24 ASSESSMENT — MIFFLIN-ST. JEOR: SCORE: 1255.24

## 2020-07-24 NOTE — PATIENT INSTRUCTIONS
Cortisporin 3 drops 4 times daily for one week.  Amoxicillin twice daily for 10 days.  Let me know if not improving
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
## 2020-07-24 NOTE — NURSING NOTE
"Initial /74 (BP Location: Right arm, Patient Position: Sitting, Cuff Size: Adult Regular)   Pulse 102   Temp 98.3  F (36.8  C) (Tympanic)   Ht 1.6 m (5' 3\")   Wt 55.6 kg (122 lb 9.6 oz)   SpO2 98%   BMI 21.72 kg/m   Estimated body mass index is 21.72 kg/m  as calculated from the following:    Height as of this encounter: 1.6 m (5' 3\").    Weight as of this encounter: 55.6 kg (122 lb 9.6 oz). .      "

## 2020-08-09 ENCOUNTER — NURSE TRIAGE (OUTPATIENT)
Dept: NURSING | Facility: CLINIC | Age: 30
End: 2020-08-09

## 2020-08-09 ENCOUNTER — MYC MEDICAL ADVICE (OUTPATIENT)
Dept: FAMILY MEDICINE | Facility: CLINIC | Age: 30
End: 2020-08-09

## 2020-08-09 DIAGNOSIS — H60.391 INFECTIVE OTITIS EXTERNA, RIGHT: ICD-10-CM

## 2020-08-09 DIAGNOSIS — H66.001 NON-RECURRENT ACUTE SUPPURATIVE OTITIS MEDIA OF RIGHT EAR WITHOUT SPONTANEOUS RUPTURE OF TYMPANIC MEMBRANE: ICD-10-CM

## 2020-08-09 RX ORDER — AMOXICILLIN 400 MG/5ML
875 POWDER, FOR SUSPENSION ORAL 2 TIMES DAILY
Qty: 218 ML | Refills: 0 | Status: CANCELLED
Start: 2020-08-09

## 2020-08-09 RX ORDER — NEOMYCIN SULFATE, POLYMYXIN B SULFATE AND HYDROCORTISONE 10; 3.5; 1 MG/ML; MG/ML; [USP'U]/ML
3 SUSPENSION/ DROPS AURICULAR (OTIC) 4 TIMES DAILY
Qty: 10 ML | Refills: 0 | Status: CANCELLED
Start: 2020-08-09

## 2020-08-09 NOTE — TELEPHONE ENCOUNTER
She was treated for an ear infection a while ago and over the last few days her ear is itching and giving her a headache. She finished the antibiotics about a week ago. She will send her provider a message via my chart, and I will route a message for a refill via her chart. Call tomorrow afternoon to check on status.    Sue Nunes RN/ [facility]        Additional Information    Negative: Moving the earlobe or touching the ear clearly increases the pain    Negative: Foreign body struck in the ear (e.g., bug, piece of cotton)    Negative: Followed an ear injury    Negative: [1] Recently diagnosed with otitis media AND [2] currently on oral antibiotics    Negative: [1] Stiff neck (unable to touch chin to chest) AND [2] fever    Negative: [1] Bony area of skull behind the ear is pink or swollen AND [2] fever    Negative: Fever > 104 F (40 C)    Negative: Patient sounds very sick or weak to the triager    Negative: [1] SEVERE pain AND [2] not improved 2 hours after taking analgesic medication (e.g., ibuprofen or acetaminophen)    Negative: Walking is very unsteady    Negative: Sudden onset of ear pain after long - thin object was inserted into the ear canal (e.g., pencil, Q-tip)    Negative: Diabetes mellitus or weak immune system (e.g., HIV positive, cancer chemo, splenectomy, organ transplant, chronic steroids)    Negative: New blurred vision or vision changes    White, yellow, or green discharge    Protocols used: EARACHE-A-AH

## 2020-08-10 ENCOUNTER — OFFICE VISIT (OUTPATIENT)
Dept: FAMILY MEDICINE | Facility: CLINIC | Age: 30
End: 2020-08-10
Payer: COMMERCIAL

## 2020-08-10 VITALS
TEMPERATURE: 97.2 F | HEIGHT: 63 IN | OXYGEN SATURATION: 99 % | HEART RATE: 94 BPM | BODY MASS INDEX: 22.18 KG/M2 | DIASTOLIC BLOOD PRESSURE: 66 MMHG | WEIGHT: 125.2 LBS | SYSTOLIC BLOOD PRESSURE: 122 MMHG

## 2020-08-10 DIAGNOSIS — B36.9 OTOMYCOSIS OF RIGHT EAR: Primary | ICD-10-CM

## 2020-08-10 DIAGNOSIS — H62.41 OTOMYCOSIS OF RIGHT EAR: Primary | ICD-10-CM

## 2020-08-10 PROCEDURE — 99213 OFFICE O/P EST LOW 20 MIN: CPT | Performed by: NURSE PRACTITIONER

## 2020-08-10 RX ORDER — CLOTRIMAZOLE 1 G/ML
SOLUTION TOPICAL 2 TIMES DAILY
Qty: 60 ML | Refills: 0 | Status: SHIPPED | OUTPATIENT
Start: 2020-08-10 | End: 2021-07-14

## 2020-08-10 ASSESSMENT — MIFFLIN-ST. JEOR: SCORE: 1267.03

## 2020-08-10 NOTE — TELEPHONE ENCOUNTER
Patient informed of message below from provider.    Patient scheduled for today at 3:20pm with Kelsi Marrufo.

## 2020-08-10 NOTE — PATIENT INSTRUCTIONS
Use the clotrimazole solution twice daily for 2 weeks. If not improving let me know and we will refer to ENT.    Thank you for choosing [facility].  You may be receiving an email and/or telephone survey request from [facility] Customer Experience regarding your visit today.  Please take a few minutes to respond to the survey to let us know how we are doing.      If you have questions or concerns, please contact us via [employer] or you can contact your care team at [phone].    Our Clinic hours are:  Monday 6:40 am  to 7:00 pm  Tuesday -Friday 6:40 am to 5:00 pm    The [place] outpatient lab hours are:  Monday - Friday 6:10 am to 4:45 pm  Saturdays 7:00 am to 11:00 am  Appointments are required, call [phone]    If you have clinical questions after hours or would like to schedule an appointment,  call the clinic at [phone].
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
## 2020-08-26 ENCOUNTER — MYC MEDICAL ADVICE (OUTPATIENT)
Dept: FAMILY MEDICINE | Facility: CLINIC | Age: 30
End: 2020-08-26

## 2020-08-27 NOTE — TELEPHONE ENCOUNTER
She can continue them, but she may also want to schedule an appointment with me so I can try to clean some of the debris out of her ear if it continues to be itchy.

## 2020-08-27 NOTE — TELEPHONE ENCOUNTER
Seen 8-10-20 for otomycosis Right ear.  Given Lortrimin drops.  Using daily.  Itching still persists.  Has ENT appt 9-8-20.  Ok to use drops until then?    Routing to provider.  Kristy BOLAND RN

## 2020-09-08 ENCOUNTER — TELEPHONE (OUTPATIENT)
Dept: OBGYN | Facility: CLINIC | Age: 30
End: 2020-09-08

## 2020-09-08 NOTE — TELEPHONE ENCOUNTER
Reason for Call:  Other call back    Detailed comments: pt calling stating she is due for her Depo injection was told she needs to do a pelvic exam prior to getting Depo inj. She has also been experiencing pelvic pressure and loss of bladder control. Would like to be seen before Oct and in the next week while she is due for her Depo     Phone Number Patient can be reached at: Home number on file [phone] (home)    Best Time: any     Can we leave a detailed message on this number? YES    Call taken on 9/8/2020 at 2:37 PM by Barbara Souza

## 2020-09-08 NOTE — TELEPHONE ENCOUNTER
Return call to pt.  Unable to reach.  Left message for pt to return call to clinic.    Angy Aguilar   [facility]  RN

## 2020-09-28 ENCOUNTER — ALLIED HEALTH/NURSE VISIT (OUTPATIENT)
Dept: OBGYN | Facility: CLINIC | Age: 30
End: 2020-09-28
Payer: COMMERCIAL

## 2020-09-28 VITALS — SYSTOLIC BLOOD PRESSURE: 122 MMHG | HEART RATE: 87 BPM | DIASTOLIC BLOOD PRESSURE: 74 MMHG

## 2020-09-28 DIAGNOSIS — Z30.011 ENCOUNTER FOR INITIAL PRESCRIPTION OF CONTRACEPTIVE PILLS: ICD-10-CM

## 2020-09-28 LAB — HCG UR QL: NEGATIVE

## 2020-09-28 PROCEDURE — 99207 ZZC NO CHARGE NURSE ONLY: CPT

## 2020-09-28 PROCEDURE — 81025 URINE PREGNANCY TEST: CPT | Performed by: OBSTETRICS & GYNECOLOGY

## 2020-09-28 PROCEDURE — 96372 THER/PROPH/DIAG INJ SC/IM: CPT

## 2020-09-28 RX ADMIN — MEDROXYPROGESTERONE ACETATE 150 MG: 150 INJECTION, SUSPENSION INTRAMUSCULAR at 14:24

## 2020-09-28 NOTE — PROGRESS NOTES
Clinic Administered Medication Documentation      Depo Provera Documentation    URINE HCG: negative    Depo-Provera Standing Order inclusion/exclusion criteria reviewed.   Patient meets: inclusion criteria     BP: 122/74 P 87   LAST PAP/EXAM:   Lab Results   Component Value Date    PAP NIL 07/09/2019       Prior to injection, verified patient identity using patient's name and date of birth. Medication was administered. Please see MAR and medication order for additional information.     Was entire vial of medication used? Yes  Vial/Syringe: Single dose vial  Expiration Date:  03/2021    Patient instructed to remain in clinic for 15 minutes, report any adverse reaction to staff immediately  and stay in clinic after the injection but patient declined.  NEXT INJECTION DUE: 12/14/20 - 12/28/20  Cathy Zhang CMA

## 2020-09-29 NOTE — TELEPHONE ENCOUNTER
Pt received her Depo shot yesterday. Late last night her leg she received the shot in started hurting and also severe abdominal cramping - still experiencing symptoms today. Wants to know if she should be seen - Please advise    Please contact pt @:  [phone] (ok to leave message)    Denise Behrendt  Specialty CSS

## 2020-09-29 NOTE — TELEPHONE ENCOUNTER
Return call to patient.  Spoke with patient on the phone.    Patient reports abdominal pain and cramping that she had to leave work for. Patient reports she did take some Ibuprofen and it seems to be better. Patient denies any bleeding. Patient denies any fever, vomiting or current nausea.    Recommended patient continue to monitor.  Ibuprofen should be continued per package directions.  If pain worsens or fails to improve, recommend further evaluation.    Pt in agreement and reports understanding.    Angy Aguilar   [facility]  RN

## 2020-11-29 ENCOUNTER — HEALTH MAINTENANCE LETTER (OUTPATIENT)
Age: 30
End: 2020-11-29

## 2020-12-17 ENCOUNTER — VIRTUAL VISIT (OUTPATIENT)
Dept: FAMILY MEDICINE | Facility: CLINIC | Age: 30
End: 2020-12-17
Payer: COMMERCIAL

## 2020-12-17 ENCOUNTER — ALLIED HEALTH/NURSE VISIT (OUTPATIENT)
Dept: OBGYN | Facility: CLINIC | Age: 30
End: 2020-12-17
Payer: COMMERCIAL

## 2020-12-17 VITALS — DIASTOLIC BLOOD PRESSURE: 79 MMHG | SYSTOLIC BLOOD PRESSURE: 126 MMHG

## 2020-12-17 DIAGNOSIS — Z30.9 CONTRACEPTION MANAGEMENT: Primary | ICD-10-CM

## 2020-12-17 DIAGNOSIS — F33.0 MAJOR DEPRESSIVE DISORDER, RECURRENT EPISODE, MILD (H): Primary | ICD-10-CM

## 2020-12-17 PROCEDURE — 96372 THER/PROPH/DIAG INJ SC/IM: CPT

## 2020-12-17 PROCEDURE — 99213 OFFICE O/P EST LOW 20 MIN: CPT | Mod: 95 | Performed by: FAMILY MEDICINE

## 2020-12-17 PROCEDURE — 99207 PR NO CHARGE NURSE ONLY: CPT

## 2020-12-17 RX ORDER — FLUOXETINE 10 MG/1
10 CAPSULE ORAL DAILY
Qty: 30 CAPSULE | Refills: 0 | Status: SHIPPED | OUTPATIENT
Start: 2020-12-17 | End: 2021-01-18

## 2020-12-17 RX ADMIN — MEDROXYPROGESTERONE ACETATE 150 MG: 150 INJECTION, SUSPENSION INTRAMUSCULAR at 15:04

## 2020-12-17 ASSESSMENT — ANXIETY QUESTIONNAIRES
GAD7 TOTAL SCORE: 18
IF YOU CHECKED OFF ANY PROBLEMS ON THIS QUESTIONNAIRE, HOW DIFFICULT HAVE THESE PROBLEMS MADE IT FOR YOU TO DO YOUR WORK, TAKE CARE OF THINGS AT HOME, OR GET ALONG WITH OTHER PEOPLE: NOT DIFFICULT AT ALL
5. BEING SO RESTLESS THAT IT IS HARD TO SIT STILL: NOT AT ALL
7. FEELING AFRAID AS IF SOMETHING AWFUL MIGHT HAPPEN: NEARLY EVERY DAY
1. FEELING NERVOUS, ANXIOUS, OR ON EDGE: NEARLY EVERY DAY
2. NOT BEING ABLE TO STOP OR CONTROL WORRYING: NEARLY EVERY DAY
3. WORRYING TOO MUCH ABOUT DIFFERENT THINGS: NEARLY EVERY DAY
6. BECOMING EASILY ANNOYED OR IRRITABLE: NEARLY EVERY DAY

## 2020-12-17 ASSESSMENT — PATIENT HEALTH QUESTIONNAIRE - PHQ9
SUM OF ALL RESPONSES TO PHQ QUESTIONS 1-9: 6
5. POOR APPETITE OR OVEREATING: NEARLY EVERY DAY

## 2020-12-17 NOTE — PROGRESS NOTES
"Michelle Torres is a 30 year old female who is being evaluated via a billable telephone visit.      The patient has been notified of following:     \"This telephone visit will be conducted via a call between you and your physician/provider. We have found that certain health care needs can be provided without the need for a physical exam.  This service lets us provide the care you need with a short phone conversation.  If a prescription is necessary we can send it directly to your pharmacy.  If lab work is needed we can place an order for that and you can then stop by our lab to have the test done at a later time.    Telephone visits are billed at different rates depending on your insurance coverage. During this emergency period, for some insurers they may be billed the same as an in-person visit.  Please reach out to your insurance provider with any questions.    If during the course of the call the physician/provider feels a telephone visit is not appropriate, you will not be charged for this service.\"    Patient has given verbal consent for Telephone visit?  Yes    What phone number would you like to be contacted at? [phone]    How would you like to obtain your AVS? Zoehart    Subjective     Michelle Torres is a 30 year old female who presents via phone visit today for the following health issues:    HPI     30 yr old female here for recheck of depression and anxiety. She was taking medication before but stopped due to side effects. She is open to trying a new medication. She is not suicidal . ( she said I have two kids for so I have them to think of) . She denies any other acute symptoms today.     Depression and Anxiety Follow-Up    How are you doing with your depression since your last visit? Worsened     How are you doing with your anxiety since your last visit?  Worsened     Are you having other symptoms that might be associated with depression or anxiety? Yes:  feels that nobody likes her    Have you had a " significant life event? Work schedule keeps changing due to covid     Do you have any concerns with your use of alcohol or other drugs? No    Social History     Tobacco Use     Smoking status: Current Every Day Smoker     Packs/day: 1.00     Years: 6.00     Pack years: 6.00     Types: Cigarettes     Smokeless tobacco: Never Used   Substance Use Topics     Alcohol use: Yes     Comment: occ     Drug use: No     PHQ 4/9/2018 5/26/2020 12/17/2020   PHQ-9 Total Score 10 7 6   Q9: Thoughts of better off dead/self-harm past 2 weeks Not at all Not at all Not at all     ANTOINE-7 SCORE 4/9/2018 5/26/2020 12/17/2020   Total Score 21 15 18     Last PHQ-9 12/17/2020   1.  Little interest or pleasure in doing things 0   2.  Feeling down, depressed, or hopeless 1   3.  Trouble falling or staying asleep, or sleeping too much 0   4.  Feeling tired or having little energy 1   5.  Poor appetite or overeating 0   6.  Feeling bad about yourself 3   7.  Trouble concentrating 0   8.  Moving slowly or restless 1   Q9: Thoughts of better off dead/self-harm past 2 weeks 0   PHQ-9 Total Score 6   Difficulty at work, home, or with people Somewhat difficult     ANTOINE-7  12/17/2020   1. Feeling nervous, anxious, or on edge 3   2. Not being able to stop or control worrying 3   3. Worrying too much about different things 3   4. Trouble relaxing 3   5. Being so restless that it is hard to sit still 0   6. Becoming easily annoyed or irritable 3   7. Feeling afraid, as if something awful might happen 3   ANTOINE-7 Total Score 18   If you checked any problems, how difficult have they made it for you to do your work, take care of things at home, or get along with other people? Not difficult at all       Suicide Assessment Five-step Evaluation and Treatment (SAFE-T)      How many servings of fruits and vegetables do you eat daily?  2-3    On average, how many sweetened beverages do you drink each day (Examples: soda, juice, sweet tea, etc.  Do NOT count diet or  artificially sweetened beverages)?   0    How many days per week do you exercise enough to make your heart beat faster? 7    How many minutes a day do you exercise enough to make your heart beat faster? 60 or more  How many days per week do you miss taking your medication? 7    What makes it hard for you to take your medications?  side effects-sleepy, increased anxiety, forgetful so stopped taking Lexapro about 1 month ago            Review of Systems   Constitutional, HEENT, cardiovascular, pulmonary, gi and gu systems are negative, except as otherwise noted.       Objective          Vitals:  No vitals were obtained today due to virtual visit.    healthy, alert and no distress  PSYCH: Alert and oriented times 3; coherent speech, normal   rate and volume, able to articulate logical thoughts, able   to abstract reason, no tangential thoughts, no hallucinations   or delusions  Her affect is normal  RESP: No cough, no audible wheezing, able to talk in full sentences  Remainder of exam unable to be completed due to telephone visits    No results found for this or any previous visit (from the past 24 hour(s)).        Assessment/Plan:    Assessment & Plan     Major depressive disorder, recurrent episode, mild (H)  Recommend therapy. Medication faxed. Recommend recheck in one month or sooner as needed.  - FLUoxetine (PROZAC) 10 MG capsule; Take 1 capsule (10 mg) by mouth daily  - MENTAL HEALTH REFERRAL  - Adult; Outpatient Treatment; Individual/Couples/Family/Group Therapy/Health Psychology; [facility]: [facility] [phone]; We will contact you to schedule the appointment or please call with any questions        FUTURE APPOINTMENTS:       - Follow-up visit in one month or sooner as needed.    Return in about 4 weeks (around 1/14/2021) for Follow up.    Leni Wlider MD  [facility]    Phone call duration:   7 minutes

## 2020-12-17 NOTE — NURSING NOTE
Clinic Administered Medication Documentation      Depo Provera Documentation    URINE HCG: not indicated    Depo-Provera Standing Order inclusion/exclusion criteria reviewed.   Patient meets: inclusion criteria     BP: 126/79  LAST PAP/EXAM:   Lab Results   Component Value Date    PAP NIL 07/09/2019       Prior to injection, verified patient identity using patient's name and date of birth. Medication was administered. Please see MAR and medication order for additional information.     Was entire vial of medication used? Yes  Vial/Syringe: Single dose vial  Expiration Date:  06/2022    Patient instructed to remain in clinic for 15 minutes and report any adverse reaction to staff immediately .  NEXT INJECTION DUE: 3/4/21 - 3/18/21    Given in Left Ventrogluteal

## 2020-12-18 ASSESSMENT — ANXIETY QUESTIONNAIRES: GAD7 TOTAL SCORE: 18

## 2021-03-17 ENCOUNTER — ALLIED HEALTH/NURSE VISIT (OUTPATIENT)
Dept: FAMILY MEDICINE | Facility: CLINIC | Age: 31
End: 2021-03-17
Payer: COMMERCIAL

## 2021-03-17 VITALS — DIASTOLIC BLOOD PRESSURE: 76 MMHG | SYSTOLIC BLOOD PRESSURE: 125 MMHG

## 2021-03-17 DIAGNOSIS — Z30.42 ENCOUNTER FOR SURVEILLANCE OF INJECTABLE CONTRACEPTIVE: Primary | ICD-10-CM

## 2021-03-17 PROCEDURE — 99207 PR NO CHARGE NURSE ONLY: CPT

## 2021-03-17 PROCEDURE — 96372 THER/PROPH/DIAG INJ SC/IM: CPT

## 2021-03-17 RX ADMIN — MEDROXYPROGESTERONE ACETATE 150 MG: 150 INJECTION, SUSPENSION INTRAMUSCULAR at 13:24

## 2021-03-17 NOTE — NURSING NOTE
Chief Complaint   Patient presents with     Imm/Inj     Depo     Clinic Administered Medication Documentation      Depo Provera Documentation    URINE HCG: not indicated    Depo-Provera Standing Order inclusion/exclusion criteria reviewed.   Patient meets: inclusion criteria     BP: 125/76  LAST PAP/EXAM:   Lab Results   Component Value Date    PAP NIL 07/09/2019       Prior to injection, verified patient identity using patient's name and date of birth. Medication was administered. Please see MAR and medication order for additional information.     Was entire vial of medication used? Yes  Vial/Syringe: Single dose vial  Expiration Date:  09/30/22    Patient instructed to report any adverse reaction to staff immediately .  NEXT INJECTION DUE: 6/2/21 - 6/16/21

## 2021-06-08 ENCOUNTER — TELEPHONE (OUTPATIENT)
Dept: OBGYN | Facility: CLINIC | Age: 31
End: 2021-06-08

## 2021-06-08 ENCOUNTER — TELEPHONE (OUTPATIENT)
Dept: FAMILY MEDICINE | Facility: CLINIC | Age: 31
End: 2021-06-08

## 2021-06-08 DIAGNOSIS — Z30.42 ENCOUNTER FOR SURVEILLANCE OF INJECTABLE CONTRACEPTIVE: Primary | ICD-10-CM

## 2021-06-08 RX ORDER — MEDROXYPROGESTERONE ACETATE 150 MG/ML
150 INJECTION, SUSPENSION INTRAMUSCULAR
Qty: 1 ML | Refills: 0
Start: 2021-06-08 | End: 2021-06-08

## 2021-06-08 NOTE — TELEPHONE ENCOUNTER
Reason for Call:  Other prescription    Detailed comments: Pt is wondering if she can go off depot shot and change to continuous BC like the pill - not taking placebo.  Depot - caused bad cramps, H/A, fatigue and anxiety super high.    Pharmacy: [facility] - [place]    Phone Number Patient can be reached at: Home number on file [phone] (home)    Best Time:     Can we leave a detailed message on this number? YES    Call taken on 6/8/2021 at 3:04 PM by Jacque Rebolledo

## 2021-06-08 NOTE — TELEPHONE ENCOUNTER
Last depo provera order done 2020 by Dr. Garner. Order is , patient has apt today for depo. Please update order. Thank You    Also it does appear she is due for physical . Thank You

## 2021-06-08 NOTE — TELEPHONE ENCOUNTER
Office visit recommended for other options if patient is not happy with depo-provera.    Appointment scheduled for 6/16/2021 at 1 pm with Dr. Bautista.    Angy Aguilar   [facility]  RN

## 2021-07-14 ENCOUNTER — OFFICE VISIT (OUTPATIENT)
Dept: FAMILY MEDICINE | Facility: CLINIC | Age: 31
End: 2021-07-14
Payer: COMMERCIAL

## 2021-07-14 VITALS
BODY MASS INDEX: 23.97 KG/M2 | OXYGEN SATURATION: 99 % | DIASTOLIC BLOOD PRESSURE: 80 MMHG | HEART RATE: 92 BPM | TEMPERATURE: 97.6 F | HEIGHT: 63 IN | RESPIRATION RATE: 16 BRPM | WEIGHT: 135.3 LBS | SYSTOLIC BLOOD PRESSURE: 114 MMHG

## 2021-07-14 DIAGNOSIS — Z30.011 ENCOUNTER FOR INITIAL PRESCRIPTION OF CONTRACEPTIVE PILLS: Primary | ICD-10-CM

## 2021-07-14 PROCEDURE — 99213 OFFICE O/P EST LOW 20 MIN: CPT | Performed by: NURSE PRACTITIONER

## 2021-07-14 RX ORDER — LEVONORGESTREL AND ETHINYL ESTRADIOL 0.15-0.03
1 KIT ORAL DAILY
Qty: 91 TABLET | Refills: 3 | Status: SHIPPED | OUTPATIENT
Start: 2021-07-14 | End: 2022-06-24

## 2021-07-14 ASSESSMENT — PATIENT HEALTH QUESTIONNAIRE - PHQ9: SUM OF ALL RESPONSES TO PHQ QUESTIONS 1-9: 4

## 2021-07-14 ASSESSMENT — MIFFLIN-ST. JEOR: SCORE: 1307.85

## 2021-07-14 NOTE — PROGRESS NOTES
"    Assessment & Plan     Encounter for initial prescription of contraceptive pills    - levonorgestrel-ethinyl estradiol (SEASONALE) 0.15-0.03 MG tablet; Take 1 tablet by mouth daily    RUPINDER Flores CNP [facility]    Diego bowers is a 31 year old who presents for the following health issues     Chief Complaint   Patient presents with     Contraception     Would like to get on a continuous form of birth control where she does not get her period.          HPI         Review of Systems   Constitutional, HEENT, cardiovascular, pulmonary, gi and gu systems are negative, except as otherwise noted.      Objective    /80 (BP Location: Right arm, Patient Position: Sitting, Cuff Size: Adult Regular)   Pulse 92   Temp 97.6  F (36.4  C) (Tympanic)   Resp 16   Ht 1.6 m (5' 3\")   Wt 61.4 kg (135 lb 4.8 oz)   SpO2 99%   BMI 23.97 kg/m    Body mass index is 23.97 kg/m .  Physical Exam   GENERAL: healthy, alert and no distress  MS: no gross musculoskeletal defects noted, no edema  SKIN: no suspicious lesions or rashes  PSYCH: mentation appears normal, affect normal/bright                "

## 2021-07-30 NOTE — PROGRESS NOTES
Michelle,    You are negative for sexually transmitted diseases. Please use condoms with every sexual encounter.    Shahla MURILLOC   30-Jul-2021 14:15

## 2021-09-25 ENCOUNTER — HEALTH MAINTENANCE LETTER (OUTPATIENT)
Age: 31
End: 2021-09-25

## 2021-11-24 ENCOUNTER — APPOINTMENT (OUTPATIENT)
Dept: URGENT CARE | Facility: CLINIC | Age: 31
End: 2021-11-24
Payer: COMMERCIAL

## 2021-11-26 ENCOUNTER — VIRTUAL VISIT (OUTPATIENT)
Dept: FAMILY MEDICINE | Facility: CLINIC | Age: 31
End: 2021-11-26
Payer: COMMERCIAL

## 2021-11-26 DIAGNOSIS — U07.1 CLINICAL DIAGNOSIS OF COVID-19: Primary | ICD-10-CM

## 2021-11-26 PROCEDURE — 99213 OFFICE O/P EST LOW 20 MIN: CPT | Mod: 95 | Performed by: INTERNAL MEDICINE

## 2021-11-26 RX ORDER — ACETAMINOPHEN 325 MG/1
500 TABLET ORAL EVERY 4 HOURS PRN
COMMUNITY

## 2021-11-26 NOTE — PATIENT INSTRUCTIONS
Monoclonal Antibody -Myrna    COVID-19 Medication Options - [facility] [email or web address]       Instructions for Patients  It is recommended that you have a test for coronavirus (COVID-19). This illness can cause fever, cough and trouble breathing. Many people get a mild case and get better on their own. Some people can get very sick.     Please follow these steps:    1. We will call to schedule your test.  2. A member of our care team will ask you some questions. Then, they will use a swab to collect samples from your nose and throat.     Our testing team will send you your test results.    How can I protect others?    Stay home and away from others (self-isolate) until:    You ve had no fever--and no medicine that reduces fever--for 1 full day (24 hours). And      Your other symptoms have resolved (gotten better). For example, your cough or breathing has improved. And     At least 10 days have passed since your symptoms started.    Stay at least 6 feet away from others. (If someone will drive you to your test, stay in the backseat, as far away from the  as you can.)     Don t go to work, school or anywhere else. When it s time for your test, go straight to the testing site. Don t make any stops on the way there or back.     Wash your hands and face often. Use soap and water.     Cover your mouth and nose with a mask, tissue or washcloth.     Don t touch anyone. No hugging, kissing or handshakes.    How can I take care of myself?    1. Get lots of rest. Drink extra fluids (unless a doctor has told you not to).     2. Take Tylenol (acetaminophen) for fever or pain. If you have liver or kidney problems, ask your family doctor if it's okay to take Tylenol.     Adults can take either:     650 mg (two 325 mg pills) every 4 to 6 hours, or     1,000 mg (two 500 mg pills) every 8 hours as needed.     Note: Don't take more than 3,000 mg in one day.   Acetaminophen is found in many medicines (both  prescribed and over-the-counter medicines). Read all labels to be sure you don't take too much.   For children, check the Tylenol bottle for the right dose. The dose is based on  the child's age or weight.    3. If you have other health problems (like cancer, heart failure, an organ transplant or severe kidney disease): Call your specialty clinic if you don't feel better in the next 2 days.    4. Know when to call 911: If your breathing is so bad that it keeps you from doing normal activities, call 911 or go to the emergency room. Tell them that you've been staying home and may have COVID-19.      Thank you for limiting contact with others, wearing a simple mask to cover your cough, practice good hand hygiene habits and accessing our virtual services where possible to limit the spread of this virus.    For more information about COVID19 and options for caring for yourself at home, please visit the CDC website at https://www.cdc.gov/coronavirus/2019-ncov/about/steps-when-sick.html  For more options for care at [facility], please visit our website at [email or web address]

## 2021-11-26 NOTE — PROGRESS NOTES
robinson is a 31 year old who is being evaluated via a billable telephone visit.      What phone number would you like to be contacted at? [phone]  How would you like to obtain your AVS? ZoeHarris    Assessment & Plan   Problem List Items Addressed This Visit     None      Visit Diagnoses     Clinical diagnosis of COVID-19    -  Primary    Relevant Orders    Symptomatic COVID-19 Virus (Coronavirus) by PCR    COVID-19 Get[facility] Loop Referral                 Patient Instructions   Monoclonal Antibody -Regeneron    [facility] - [email or web address]       Instructions for Patients  It is recommended that you have a test for coronavirus (COVID-19). This illness can cause fever, cough and trouble breathing. Many people get a mild case and get better on their own. Some people can get very sick.     Please follow these steps:    1. We will call to schedule your test.  2. A member of our care team will ask you some questions. Then, they will use a swab to collect samples from your nose and throat.     Our testing team will send you your test results.    How can I protect others?    Stay home and away from others (self-isolate) until:    You ve had no fever--and no medicine that reduces fever--for 1 full day (24 hours). And      Your other symptoms have resolved (gotten better). For example, your cough or breathing has improved. And     At least 10 days have passed since your symptoms started.    Stay at least 6 feet away from others. (If someone will drive you to your test, stay in the backseat, as far away from the  as you can.)     Don t go to work, school or anywhere else. When it s time for your test, go straight to the testing site. Don t make any stops on the way there or back.     Wash your hands and face often. Use soap and water.     Cover your mouth and nose with a mask, tissue or washcloth.     Don t touch anyone. No hugging, kissing or handshakes.    How can I take care of  myself?    1. Get lots of rest. Drink extra fluids (unless a doctor has told you not to).     2. Take Tylenol (acetaminophen) for fever or pain. If you have liver or kidney problems, ask your family doctor if it's okay to take Tylenol.     Adults can take either:     650 mg (two 325 mg pills) every 4 to 6 hours, or     1,000 mg (two 500 mg pills) every 8 hours as needed.     Note: Don't take more than 3,000 mg in one day.   Acetaminophen is found in many medicines (both prescribed and over-the-counter medicines). Read all labels to be sure you don't take too much.   For children, check the Tylenol bottle for the right dose. The dose is based on  the child's age or weight.    3. If you have other health problems (like cancer, heart failure, an organ transplant or severe kidney disease): Call your specialty clinic if you don't feel better in the next 2 days.    4. Know when to call 911: If your breathing is so bad that it keeps you from doing normal activities, call 911 or go to the emergency room. Tell them that you've been staying home and may have COVID-19.      Thank you for limiting contact with others, wearing a simple mask to cover your cough, practice good hand hygiene habits and accessing our virtual services where possible to limit the spread of this virus.    For more information about COVID19 and options for caring for yourself at home, please visit the CDC website at https://www.cdc.gov/coronavirus/2019-ncov/about/steps-when-sick.html  For more options for care at [facility], please visit our website at [email or web address]      No follow-ups on file.    Christina Garner, DO  [facility] JENS bowers is a 31 year old who presents for the following health issues     HPI       Concern for COVID-19  About how many days ago did these symptoms start? Past Wednesday 11/24 ( bf (does not live w/him) tested positive Wednesday 11/24, school that work was  shut down)  Is this your first visit for this illness? Yes  In the 14 days before your symptoms started, have you had close contact with someone with COVID-19 (Coronavirus)? Yes, I have been in contact with someone who has COVID-19/Coronavirus (confirmed by lab test).  Do you have a fever or chills? Yes, the highest temperature was 101F Frontal  Are you having new or worsening difficulty breathing? No  Do you have new or worsening cough? Yes, it's a dry cough.   Have you had any new or unexplained body aches? YES    Have you experienced any of the following NEW symptoms?    Headache: YES    Sore throat: No    Loss of taste or smell: No    Chest pain: No    Diarrhea: YES    Rash: No  What treatments have you tried? Tylenol every 4 hrs (325mg)  Who do you live with? Kids  Are you, or a household member, a healthcare worker or a ? No  Do you live in a nursing home, group home, or shelter? No  Do you have a way to get food/medications if quarantined? Yes, I have a friend or family member who can help me.  --fever, myalgias, headache, cough, rhinorrhea  --has not had covid vaccine              Review of Systems   Constitutional, HEENT, cardiovascular, pulmonary, gi and gu systems are negative, except as otherwise noted.      Objective           Vitals:  No vitals were obtained today due to virtual visit.    Physical Exam   alert, no distress and fatigued  PSYCH: Alert and oriented times 3; coherent speech, normal   rate and volume, able to articulate logical thoughts, able   to abstract reason, no tangential thoughts, no hallucinations   or delusions  Her affect is normal  RESP: occ cough, no audible wheezing, able to talk in full sentences  Remainder of exam unable to be completed due to telephone visits                Phone call duration: 11 minutes

## 2021-11-28 ENCOUNTER — LAB (OUTPATIENT)
Dept: FAMILY MEDICINE | Facility: CLINIC | Age: 31
End: 2021-11-28
Attending: INTERNAL MEDICINE
Payer: COMMERCIAL

## 2021-11-28 DIAGNOSIS — U07.1 CLINICAL DIAGNOSIS OF COVID-19: ICD-10-CM

## 2021-11-28 PROCEDURE — U0003 INFECTIOUS AGENT DETECTION BY NUCLEIC ACID (DNA OR RNA); SEVERE ACUTE RESPIRATORY SYNDROME CORONAVIRUS 2 (SARS-COV-2) (CORONAVIRUS DISEASE [COVID-19]), AMPLIFIED PROBE TECHNIQUE, MAKING USE OF HIGH THROUGHPUT TECHNOLOGIES AS DESCRIBED BY CMS-2020-01-R: HCPCS

## 2021-11-28 PROCEDURE — U0005 INFEC AGEN DETEC AMPLI PROBE: HCPCS

## 2021-11-29 LAB — SARS-COV-2 RNA RESP QL NAA+PROBE: POSITIVE

## 2021-12-17 ENCOUNTER — VIRTUAL VISIT (OUTPATIENT)
Dept: FAMILY MEDICINE | Facility: CLINIC | Age: 31
End: 2021-12-17
Payer: COMMERCIAL

## 2021-12-17 DIAGNOSIS — H92.02 OTALGIA, LEFT: Primary | ICD-10-CM

## 2021-12-17 PROCEDURE — 99213 OFFICE O/P EST LOW 20 MIN: CPT | Mod: 95 | Performed by: FAMILY MEDICINE

## 2021-12-17 RX ORDER — CIPROFLOXACIN AND DEXAMETHASONE 3; 1 MG/ML; MG/ML
4 SUSPENSION/ DROPS AURICULAR (OTIC) 2 TIMES DAILY
Qty: 7.5 ML | Refills: 0 | Status: SHIPPED | OUTPATIENT
Start: 2021-12-17 | End: 2022-03-18

## 2021-12-17 NOTE — PROGRESS NOTES
robinson is a 31 year old who is being evaluated via a billable video visit.      How would you like to obtain your AVS? MyChart  If the video visit is dropped, the invitation should be resent by: Text to cell phone: [phone]  Will anyone else be joining your video visit? No      Video Start Time: 9:36 AM    Assessment & Plan     Otalgia, left (possible OM)  - ciprofloxacin-hydrocortisone (CIPRO HC OTIC) 0.2-1 % otic suspension  Dispense: 10 mL; Refill: 0  I discussed with her the possibility that the pain in the ear could be ear infection or they could be as a result of injury that she has sustained while using a Q-tip.  Point because this has been going on and she has had this type of pain in the past and ended up having infection, I am going to do topical medication for her.  I did send in a ciprofloxacin eardrops.  She will let us know if there is no improvement and then may follow-up with her primary for change in management.       Tobacco Cessation:   reports that she has been smoking cigarettes. She has a 3.00 pack-year smoking history. She has never used smokeless tobacco.          No follow-ups on file.    Adiel Andrew MD  [facility]   robinson is a 31 year old who presents for the following health issues   HPI   Having a video visit because of ear pain noted on the left side that has been going on for about a week now.  And noted the ear that feels plugged, also feels wet in having deep discomfort.  She had felt wet in the ear, and used a Q-tip on it.  Has been having pain since then, pain he also appears to radiate into the back of the ear as well as into the left temple.  She has no cough or shortness of breath.  She did have a recent positive Covid and question that it is improved at this time.  She is hoping to get some antibiotics to help with this.    Family History   Problem Relation Age of Onset     Cancer Mother         cervical     Hypertension  Mother      Depression Mother      Anxiety Disorder Mother      Alcohol/Drug Mother         drugs recovered     Depression Father      Alcohol/Drug Father         drugs- recovered     C.A.D. Maternal Grandmother      Heart Disease Maternal Grandmother         valve replacement     Cerebrovascular Disease Maternal Grandmother         brain aneurysm     Diabetes Maternal Grandmother      Alcohol/Drug Maternal Grandmother         alcohol     Cerebrovascular Disease Maternal Grandfather         brain aneurysm     Diabetes Paternal Grandmother      Depression Paternal Grandmother      C.A.D. 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 No   Social History Narrative     Not on file     Social Determinants of Health     Financial Resource Strain: Not on file   Food Insecurity: Not on file   Transportation Needs: Not on file   Physical Activity: Not on file   Stress: Not on file   Social Connections: Not on file   Intimate Partner Violence: Not on file   Housing Stability: Not on file      Past Surgical History:   Procedure Laterality Date     SURGICAL HISTORY OF -   age 9    impacted  teeth      Past Medical History:   Diagnosis Date     Chickenpox      Depression      Other abnormal heart sounds     present since birth        Review of Systems   CONSTITUTIONAL: NEGATIVE for fever, chills, change in weight  ENT/MOUTH: NEGATIVE for fever and sore throat  RESP: NEGATIVE for significant cough or SOB  CV: NEGATIVE for chest pain, palpitations or peripheral edema  NEURO: NEGATIVE for vertigo      Objective           Vitals:  No vitals were obtained today due to virtual visit.    Physical Exam   GENERAL: Healthy, alert and no distress  EYES: Eyes grossly normal to inspection  RESP: Quiet respiration with no audible cough or wheezing  PSYCH: Mentation appears normal, affect normal/bright, judgement and insight intact, normal speech and appearance well-groomed.          Video-Visit Details    Type of service:  Video Visit    Video End Time:9:43 AM    Originating Location (pt. Location): Home    Distant Location (provider location):  [facility]     Platform used for Video Visit: [employer]

## 2022-01-15 ENCOUNTER — HEALTH MAINTENANCE LETTER (OUTPATIENT)
Age: 32
End: 2022-01-15

## 2022-02-24 ENCOUNTER — OFFICE VISIT (OUTPATIENT)
Dept: FAMILY MEDICINE | Facility: CLINIC | Age: 32
End: 2022-02-24
Payer: COMMERCIAL

## 2022-02-24 VITALS
HEIGHT: 65 IN | RESPIRATION RATE: 12 BRPM | SYSTOLIC BLOOD PRESSURE: 118 MMHG | TEMPERATURE: 98.7 F | BODY MASS INDEX: 23.31 KG/M2 | DIASTOLIC BLOOD PRESSURE: 70 MMHG | WEIGHT: 139.9 LBS | OXYGEN SATURATION: 98 % | HEART RATE: 105 BPM

## 2022-02-24 DIAGNOSIS — B96.89 ACUTE BACTERIAL TONSILLITIS: Primary | ICD-10-CM

## 2022-02-24 DIAGNOSIS — J03.80 ACUTE BACTERIAL TONSILLITIS: Primary | ICD-10-CM

## 2022-02-24 DIAGNOSIS — J35.1 TONSILLAR HYPERTROPHY: ICD-10-CM

## 2022-02-24 DIAGNOSIS — G44.219 EPISODIC TENSION-TYPE HEADACHE, NOT INTRACTABLE: ICD-10-CM

## 2022-02-24 PROCEDURE — 99213 OFFICE O/P EST LOW 20 MIN: CPT | Performed by: NURSE PRACTITIONER

## 2022-02-24 RX ORDER — AMOXICILLIN 400 MG/5ML
875 POWDER, FOR SUSPENSION ORAL 2 TIMES DAILY
Qty: 218 ML | Refills: 0 | Status: SHIPPED | OUTPATIENT
Start: 2022-02-24 | End: 2022-03-06

## 2022-02-24 ASSESSMENT — PATIENT HEALTH QUESTIONNAIRE - PHQ9
5. POOR APPETITE OR OVEREATING: SEVERAL DAYS
SUM OF ALL RESPONSES TO PHQ QUESTIONS 1-9: 4

## 2022-02-24 ASSESSMENT — ANXIETY QUESTIONNAIRES
1. FEELING NERVOUS, ANXIOUS, OR ON EDGE: SEVERAL DAYS
IF YOU CHECKED OFF ANY PROBLEMS ON THIS QUESTIONNAIRE, HOW DIFFICULT HAVE THESE PROBLEMS MADE IT FOR YOU TO DO YOUR WORK, TAKE CARE OF THINGS AT HOME, OR GET ALONG WITH OTHER PEOPLE: SOMEWHAT DIFFICULT
3. WORRYING TOO MUCH ABOUT DIFFERENT THINGS: SEVERAL DAYS
2. NOT BEING ABLE TO STOP OR CONTROL WORRYING: SEVERAL DAYS
7. FEELING AFRAID AS IF SOMETHING AWFUL MIGHT HAPPEN: SEVERAL DAYS
5. BEING SO RESTLESS THAT IT IS HARD TO SIT STILL: NOT AT ALL
6. BECOMING EASILY ANNOYED OR IRRITABLE: SEVERAL DAYS
GAD7 TOTAL SCORE: 6

## 2022-02-24 ASSESSMENT — PAIN SCALES - GENERAL: PAINLEVEL: SEVERE PAIN (6)

## 2022-02-24 NOTE — PROGRESS NOTES
Assessment & Plan     Acute bacterial tonsillitis  The risks, benefits and treatment options of prescribed medications or other treatments have been discussed with the patient. The patient verbalized their understanding and should call or follow up if no improvement or if they develop further problems.    - amoxicillin (AMOXIL) 400 MG/5ML suspension  Dispense: 218 mL; Refill: 0  - Otolaryngology Referral    Episodic tension-type headache, not intractable  Likely worsened due to above. Treat and follow up if not improving.  Recommend Ibuprofen 600-800 mg every 6-8 hours.    Tonsillar hypertrophy     - Otolaryngology Referral          Tobacco Cessation:   reports that she has been smoking cigarettes. She has a 3.00 pack-year smoking history. She has never used smokeless tobacco.      See Patient Instructions    No follow-ups on file.    Jennifer Garcia NP  [facility]    Diego bowers is a 31 year old who presents for the following health issues  accompanied by her daughter.    History of Present Illness     Reason for visit:  Ear pain, headache  Symptom onset:  1-3 days ago  Symptoms include:  Ear pain, headache, jaw pain  Symptom intensity:  Moderate  Symptom progression:  Staying the same  Had these symptoms before:  Yes  Has tried/received treatment for these symptoms:  Yes  Previous treatment was successful:  Yes  Prior treatment description:  Medicine  What makes it worse:  No  What makes it better:  Ibuprofen and Tylenol    She eats 2-3 servings of fruits and vegetables daily.She consumes 2 sweetened beverage(s) daily.She exercises with enough effort to increase her heart rate 60 or more minutes per day.  She exercises with enough effort to increase her heart rate 5 days per week.   She is taking medications regularly.       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 Had virtual visit 12/17 - diagnosed with ear pain and given ear drops.  Ear pain resolved with ear drops.  Ear pain returned 2 weeks later in left ear.  Then a few weeks later right ear started having ear pain as well.    Then feels like she started having upper respiratory infection symptoms and congestion.    Also feels like she is needing to see a dentist due to teeth and wisdom teeth removal.    Headaches      Duration: noticed since having CoVid on Thanksgiving    Description  Location: bilateral in the temporal area, little everywhere, depends   Character: sharp pain  Frequency:  One every other week, but lasts several days  Duration:  Comes on for the whole day     Intensity:  moderate, severe, 6/10    Accompanying signs and symptoms:     Precipitating or Alleviating factors:  Nausea/vomiting: no but feels like it  Dizziness: usually  Weakness or numbness: no  Visual changes: wavy/zigzag lines  Fever: no   Sinus or URI symptoms no     History  Head trauma: no   Family history of migraines: YES  Previous tests for headaches: YES MRI before pregnancy   Neurologist evaluations: no   Able to do daily activities when headache present: YES- depends  Wake with headaches: YES  Daily pain medication use: no   Any changes in: single mom with young kids     Precipitating or Alleviating factors  "(light/sound/sleep/caffeine): coffee does help once in awhile, dark room, rest and just OTC pain medications    Therapies tried and outcome: Ibuprofen (Advil, Motrin) and Tylenol    Outcome - usually effective  Frequent/daily pain medication use: no      Happening at the same time with ear symptoms - but worse.  Taking Tylenol and Ibuprofen.    History of migraines.  Had MRI done and was normal.  Did change birth control a few months ago.   Taking Seasonale and has noticed headaches have been worse since being on oral contraceptive pills     Had IUD - got pelvic inflammatory disease.  Was on Depo and had headaches on this as well.    History of menorrhagia - has seen OB/GYN in the past.      Review of Systems   Constitutional, HEENT, cardiovascular, pulmonary, GI, , musculoskeletal, neuro, skin, endocrine and psych systems are negative, except as otherwise noted.      Objective    /70 (BP Location: Right arm, Patient Position: Chair, Cuff Size: Adult Small)   Pulse 105   Temp 98.7  F (37.1  C) (Tympanic)   Resp 12   Ht 1.645 m (5' 4.75\")   Wt 63.5 kg (139 lb 14.4 oz)   SpO2 98%   Breastfeeding No   BMI 23.46 kg/m    Body mass index is 23.46 kg/m .  Physical Exam   GENERAL: healthy, alert and no distress  HENT: normal cephalic/atraumatic, ear canals and TM's normal, nose and mouth without ulcers or lesions, oropharynx clear, oral mucous membranes moist, tonsillar hypertrophy, tonsillar erythema, tonsillar exudate and tonsillar to midline bilateral  NECK: cervical adenopathy left, no asymmetry, masses, or scars and thyroid normal to palpation  RESP: lungs clear to auscultation - no rales, rhonchi or wheezes  CV: regular rate and rhythm, normal S1 S2, no S3 or S4, no murmur, click or rub, no peripheral edema and peripheral pulses strong  ABDOMEN: soft, nontender, no hepatosplenomegaly, no masses and bowel sounds normal  MS: no gross musculoskeletal defects noted, no edema          "

## 2022-02-24 NOTE — PATIENT INSTRUCTIONS
Patient Education     Tonsillitis in Adults  Tonsillitis is swelling and redness (inflammation) of the tonsils. It happens when the tonsils are infected by a virus or a bacteria. Your tonsils are 2 pink, oval lymph glands at the back of your throat. They are part of your immune system, which helps your body fight infection. They react when germs get inside your nose and mouth.  Tonsillitis is very common. It is most often seen in children, but it can also occur in young adults.  The viruses and bacteria that cause tonsillitis can be easily passed from one person to another.    What causes tonsillitis?  Tonsillitis is most often caused by a virus.  Common viruses that cause tonsillitis include:    Cold viruses    Adenoviruses    Kelly-Barr virus    Infectious mononucleosis    Herpes simplex virus (HSV)    Cytomegalovirus    Measles  In some cases tonsillitis is caused by a bacteria. Bacterial tonsillitis is often called strep throat. The most common type of bacteria that causes tonsillitis is GABS (Group A beta-hemolytic streptococcus). The bacteria is spread through droplets in the air. This happens when someone with the virus coughs or sneezes. It can also be spread by sharing food or drinks.  Symptoms of tonsillitis  Symptoms will depend on which type of tonsillitis you have. There are several types of tonsillitis.  Acute tonsillitis  Symptoms for this type often go away in a few days. But they can last up to 2 weeks. In some cases symptoms come back after treatment is done (acute recurrent tonsillitis). Symptoms include:    Fever    Sore throat    Bad breath    Trouble swallowing    Fluid loss (dehydration)    Sore lymph nodes in the neck    Tiredness    Snoring, sleep apnea, or breathing through the mouth    White patches, pus, or red tonsils    A red rash on the body  Chronic tonsillitis  For this type, the infection or inflammation lasts for a few months. Symptoms include:    Lasting sore throat    Bad  breath    Lasting sore lymph nodes in the neck    Bacteria and debris collecting on the tonsils (called tonsil stones)  Peritonsillar abscess  This is a severe form of tonsillitis. It occurs when a pocket of pus (an abscess) forms around the tonsil. You need treatment right away. This can help stop the abscess from blocking your airway. Symptoms include:    Severe throat pain    Trouble opening the mouth    Drooling    Voice sounds muffled    One tonsil may look larger  Diagnosing tonsillitis  If you have symptoms, see your primary healthcare provider. Or see an ear, nose, and throat doctor (ENT or otolaryngologist).  The provider will ask about your symptoms. He or she will also check your ear, nose, and throat for any swelling and infection. The provider will then swab your tonsils or the back of your throat. This sample can be checked in the provider s office for strep throat. This is called a rapid strep test. Results are ready in a few minutes. But there can be false negatives with this test. So the provider will likely also send the sample out to a lab for testing (throat culture). The lab results will take 24 hours or longer. But a throat culture is more accurate.  Treatment for tonsillitis  Treatment will depend on what is causing the tonsillitis. If it s caused by bacteria, then your provider may prescribe antibiotics to help you recover. Finish all of the medicine even if you start to feel better.  Tonsillitis caused by a virus can t be treated with antibiotics. This kind of infection often goes away on its own. Home care may be all that you need, with rest and fluids. Follow these tips to help ease your symptoms at home:    Get plenty of rest.    Drink lots of fluids, such as soup, broth, and tea with honey and lemon.    East soft foods such as ice cream, applesauce, and flavored gelatins.    Gargle with warm saltwater.    Use over-the-counter throat sprays or lozenges for throat pain.    Take  over-the-counter medicine for fever and pain, as directed.    Use a cool-mist humidifier to keep the air moist.  In severe cases, a person may be dehydrated or have a blocked airway. They may need to be hospitalized.  Surgery to remove the tonsils (tonsillectomy) may be needed if you have any of these:    Chronic tonsillitis    Tonsillitis that keeps coming back    Obstructive sleep apnea    Acute recurrent tonsillitis  If you have a peritonsillar abscess, surgery may be done to drain the abscess.  Preventing tonsillitis  Tonsillitis itself can t spread. But the virus and bacteria that cause it can be passed to other people.  No vaccine or medicine can prevent tonsillitis. These tips can help keep you from spreading or catching an illness that can cause tonsillitis:    Stay away from anyone with tonsillitis or a sore throat as much as possible.    Don't share utensils, drinking glasses, toothbrushes, or other personal objects with anyone who has tonsillitis or a sore throat.    Wash your hands correctly. Wash them often with soap and water often. Use hand  when you can t wash your hands.    Cover your mouth when you cough or sneeze.  Call 911  Call 911 if you have any of these symptoms:    Trouble breathing or speaking    Trouble swallowing or opening your mouth    Swollen mouth and throat    Drooling  When to call your healthcare provider  Call your healthcare provider if you have any of these symptoms:    Fever of 100.4 F (38 C) or higher, or as directed by your provider    A lump that gets larger    Worsening throat pain or neck pain    Unable to open your mouth fully (called lockjaw or trismus)    Neck stiffness    Bleeding    Painful swallowing    Feeling very ill or sick    Sore throat for more than 2 days     Katey last reviewed this educational content on 7/1/2019 2000-2021 [employer]. All rights reserved. This information is not intended as a substitute for professional medical  care. Always follow your healthcare professional's instructions.

## 2022-02-25 ASSESSMENT — ANXIETY QUESTIONNAIRES: GAD7 TOTAL SCORE: 6

## 2022-03-17 ENCOUNTER — MYC MEDICAL ADVICE (OUTPATIENT)
Dept: FAMILY MEDICINE | Facility: CLINIC | Age: 32
End: 2022-03-17
Payer: COMMERCIAL

## 2022-03-18 ENCOUNTER — VIRTUAL VISIT (OUTPATIENT)
Dept: FAMILY MEDICINE | Facility: CLINIC | Age: 32
End: 2022-03-18
Payer: COMMERCIAL

## 2022-03-18 DIAGNOSIS — K04.7 DENTAL INFECTION: Primary | ICD-10-CM

## 2022-03-18 PROCEDURE — 99213 OFFICE O/P EST LOW 20 MIN: CPT | Mod: 95 | Performed by: PHYSICIAN ASSISTANT

## 2022-03-18 RX ORDER — AMOXICILLIN AND CLAVULANATE POTASSIUM 400; 57 MG/5ML; MG/5ML
875 POWDER, FOR SUSPENSION ORAL 2 TIMES DAILY
Qty: 218 ML | Refills: 0 | Status: SHIPPED | OUTPATIENT
Start: 2022-03-18 | End: 2022-03-28

## 2022-03-18 NOTE — PATIENT INSTRUCTIONS
[email or web address]    Contact [facility] to see if you could be seen sooner: Phone: [phone] ([place])  Phone: [phone] ([place])    Start augmentin today

## 2022-03-18 NOTE — PROGRESS NOTES
robinson is a 31 year old who is being evaluated via a billable video visit.      How would you like to obtain your AVS? Mail a copy  If the video visit is dropped, the invitation should be resent by: Text to cell phone: [phone]  Will anyone else be joining your video visit? No      Video Start Time: 4:03    Assessment and Plan:     (K04.7) Dental infection  (primary encounter diagnosis)  Comment: chipped right upper molar about a month ago, now with pain and possible drainage x one week, has appt with dentist on 4/30/22, limited by insurance, requesting liquid medication  Plan: amoxicillin-clavulanate (AUGMENTIN) 400-57         MG/5ML suspension  Start augmentin todday  [facility] can get [facility] sooner  Discussed s/sxs worsening infection and the need to be seen promptly if develops      Marina Rodrigez PA-C        Subjective   robinson is a 31 year old who presents for the following health issues     HPI     About a month ago she chipped a molar (right upper)  She has had pain in the area about a week  Hasn't noticed drainage but has noticed a foul taste in her mouth  She hasn't noticed any swelling   She is still able to open her jaw and eat/drink  She denies fever/chills  She will see her dentist on April 30th      Review of Systems   See above      Objective         Vitals:  No vitals were obtained today due to virtual visit.    Physical Exam   GENERAL: Healthy, alert and no distress  EYES: Eyes grossly normal to inspection.  No discharge or erythema, or obvious scleral/conjunctival abnormalities.  RESP: No audible wheeze, cough, or visible cyanosis.  No visible retractions or increased work of breathing.    SKIN: Visible skin clear. No significant rash, abnormal pigmentation or lesions.  NEURO: Cranial nerves grossly intact.  Mentation and speech appropriate for age.  PSYCH: Mentation appears normal, affect normal/bright, judgement and insight intact, normal speech  and appearance well-groomed.          Video-Visit Details    Type of service:  Video Visit    Video End Time:4:11    Originating Location (pt. Location): Home    Distant Location (provider location):  [facility]     Platform used for Video Visit: Eduardo

## 2022-03-21 NOTE — TELEPHONE ENCOUNTER
RN sees patient did a virtual visit on 3/18/22, so closing this encounter.     Apple Chen RN  [facility]

## 2022-03-22 ENCOUNTER — TELEPHONE (OUTPATIENT)
Dept: FAMILY MEDICINE | Facility: CLINIC | Age: 32
End: 2022-03-22
Payer: COMMERCIAL

## 2022-03-22 NOTE — TELEPHONE ENCOUNTER
Patient said that she was on antibiotics and she now has a yeast infection she is asking for a antibiotic for it.     Tammi Lopez PSC on 3/22/2022 at 4:00 PM

## 2022-03-23 NOTE — TELEPHONE ENCOUNTER
Call placed to patient  No answer; generic voicemail left requesting call back to Clinic RN at [phone]    Eduardo Chou RN

## 2022-03-23 NOTE — TELEPHONE ENCOUNTER
I have not seen this pt in years.  She can either contact the provider who gave her the antibiotics or send an Evisit    Rochelle Carlisle, DO

## 2022-03-24 NOTE — TELEPHONE ENCOUNTER
Call placed to patient  No answer; generic voicemail left requesting call back to Clinic RN at [phone]     Eduardo Chou RN

## 2022-03-28 DIAGNOSIS — B37.31 YEAST VAGINITIS: Primary | ICD-10-CM

## 2022-03-28 RX ORDER — FLUCONAZOLE 150 MG/1
150 TABLET ORAL ONCE
Qty: 1 TABLET | Refills: 0 | Status: SHIPPED | OUTPATIENT
Start: 2022-03-28 | End: 2022-03-28

## 2022-04-04 ENCOUNTER — TELEPHONE (OUTPATIENT)
Dept: FAMILY MEDICINE | Facility: CLINIC | Age: 32
End: 2022-04-04
Payer: COMMERCIAL

## 2022-04-04 NOTE — TELEPHONE ENCOUNTER
Patient's call transferred to author    Patient reports her mother was recently diagnosed with  Cancer   Requesting a referral for family grief counseling be placed - believes this would be beneficial for her and her children     Patient also states she feels she will need to possibly start a medication for anxiety and depression since receiving the news of her mother's illness     Telephone appointment scheduled for 4/5/2022 at 1730 with Dr. Carlisle     Patient verbalized understanding  No further questions/concerns    Eduardo Chou RN

## 2022-04-05 ENCOUNTER — VIRTUAL VISIT (OUTPATIENT)
Dept: FAMILY MEDICINE | Facility: CLINIC | Age: 32
End: 2022-04-05
Payer: COMMERCIAL

## 2022-04-05 DIAGNOSIS — F43.21 GRIEF: ICD-10-CM

## 2022-04-05 DIAGNOSIS — F41.1 GAD (GENERALIZED ANXIETY DISORDER): Primary | ICD-10-CM

## 2022-04-05 DIAGNOSIS — F33.2 SEVERE EPISODE OF RECURRENT MAJOR DEPRESSIVE DISORDER, WITHOUT PSYCHOTIC FEATURES (H): ICD-10-CM

## 2022-04-05 PROCEDURE — 99214 OFFICE O/P EST MOD 30 MIN: CPT | Mod: 95 | Performed by: FAMILY MEDICINE

## 2022-04-05 RX ORDER — ESCITALOPRAM OXALATE 10 MG/1
10 TABLET ORAL DAILY
Qty: 30 TABLET | Refills: 1 | Status: SHIPPED | OUTPATIENT
Start: 2022-04-05 | End: 2022-06-07 | Stop reason: DRUGHIGH

## 2022-04-05 ASSESSMENT — PATIENT HEALTH QUESTIONNAIRE - PHQ9
SUM OF ALL RESPONSES TO PHQ QUESTIONS 1-9: 18
5. POOR APPETITE OR OVEREATING: NEARLY EVERY DAY

## 2022-04-05 ASSESSMENT — ANXIETY QUESTIONNAIRES
2. NOT BEING ABLE TO STOP OR CONTROL WORRYING: NEARLY EVERY DAY
7. FEELING AFRAID AS IF SOMETHING AWFUL MIGHT HAPPEN: NEARLY EVERY DAY
1. FEELING NERVOUS, ANXIOUS, OR ON EDGE: NEARLY EVERY DAY
6. BECOMING EASILY ANNOYED OR IRRITABLE: NEARLY EVERY DAY
IF YOU CHECKED OFF ANY PROBLEMS ON THIS QUESTIONNAIRE, HOW DIFFICULT HAVE THESE PROBLEMS MADE IT FOR YOU TO DO YOUR WORK, TAKE CARE OF THINGS AT HOME, OR GET ALONG WITH OTHER PEOPLE: SOMEWHAT DIFFICULT
GAD7 TOTAL SCORE: 19
5. BEING SO RESTLESS THAT IT IS HARD TO SIT STILL: SEVERAL DAYS
3. WORRYING TOO MUCH ABOUT DIFFERENT THINGS: NEARLY EVERY DAY

## 2022-04-05 NOTE — PROGRESS NOTES
robinson is a 31 year old who is being evaluated via a billable telephone visit.      What phone number would you like to be contacted at?   How would you like to obtain your AVS?   6:02 PM      A/P:      ICD-10-CM    1. ANTOINE (generalized anxiety disorder)  F41.1 escitalopram (LEXAPRO) 10 MG tablet     Adult Mental Health  Referral   2. Grief  F43.21 Adult Mental Health  Referral   3. Severe episode of recurrent major depressive disorder, without psychotic features (H)  F33.2      Reviewed possible side effects of medication and time to effect.  Referral for therapy placed.  Plan f/u in 4-6 weeks, sooner for concerns.      Subjective   robinson is a 31 year old who presents for the following health issues     HPI     Abnormal Mood Symptoms  Onset/Duration: 1 month   Description: anxiety  Depression (if yes, do PHQ-9): YES  Anxiety (if yes, do ANTOINE-7): YES  Accompanying Signs & Symptoms:  Still participating in activities that you used to enjoy: YES  Fatigue: YES  Irritability: YES  Difficulty concentrating: YES  Changes in appetite: YES  Problems with sleep: YES  Heart racing/beating fast: YES  Abnormally elevated, expansive, or irritable mood: no  Persistently increased activity or energy: no  Thoughts of hurting yourself or others: no  History:  Recent stress or major life event: YES- mother has lung cancer  Prior depression or anxiety: yes  Family history of depression or anxiety: YES  Alcohol/drug use: no  Difficulty sleeping: YES  Precipitating or alleviating factors: None  Therapies tried and outcome: individual therapy and medication(s) Prozac (fluoxetine) and Lexapro (escitalopram)  PHQ 7/14/2021 2/24/2022 4/5/2022   PHQ-9 Total Score 4 4 18   Q9: Thoughts of better off dead/self-harm past 2 weeks Not at all Not at all Not at all     ANTOINE-7 SCORE 12/17/2020 2/24/2022 4/5/2022   Total Score 18 6 19     Has been under a lot of stress.  Mom recently diagnosed with lung cancer.  Working on clarifying  "staging and discussing treatment.  Lots of uncertainty  Had been having irritability issues.  Finds she is losing her temper or getting angry easily.  Feeling both depressed and anxious.,    Has had issues with mood in the past.  Has been prescribed medication for mood/anxiety in the past but has never taken it beyond a few days due to worries over \"being allergic\".  Willing to consider again now.  Would also like to visit with a counselor.    Review of Systems   Constitutional, HEENT, cardiovascular, pulmonary, gi and gu systems are negative, except as otherwise noted.      Objective           Vitals:  No vitals were obtained today due to virtual visit.    Physical Exam   healthy, alert and no distress  PSYCH: Alert and oriented times 3; coherent speech, normal   rate and volume, able to articulate logical thoughts, able   to abstract reason, no tangential thoughts, no hallucinations   or delusions  Her affect is normal  RESP: No cough, no audible wheezing, able to talk in full sentences  Remainder of exam unable to be completed due to telephone visits    Epic reviewed          6:09 PM    Phone call duration: 7 minutes  "

## 2022-04-06 ASSESSMENT — ANXIETY QUESTIONNAIRES: GAD7 TOTAL SCORE: 19

## 2022-04-11 ENCOUNTER — MYC MEDICAL ADVICE (OUTPATIENT)
Dept: FAMILY MEDICINE | Facility: CLINIC | Age: 32
End: 2022-04-11
Payer: COMMERCIAL

## 2022-04-12 ENCOUNTER — VIRTUAL VISIT (OUTPATIENT)
Dept: FAMILY MEDICINE | Facility: CLINIC | Age: 32
End: 2022-04-12
Payer: COMMERCIAL

## 2022-04-12 DIAGNOSIS — B37.31 YEAST VAGINITIS: ICD-10-CM

## 2022-04-12 DIAGNOSIS — K04.7 DENTAL INFECTION: Primary | ICD-10-CM

## 2022-04-12 PROBLEM — F32.4 MAJOR DEPRESSIVE DISORDER WITH SINGLE EPISODE, IN PARTIAL REMISSION (H): Status: RESOLVED | Noted: 2017-06-20 | Resolved: 2022-04-12

## 2022-04-12 PROCEDURE — 99214 OFFICE O/P EST MOD 30 MIN: CPT | Mod: 95 | Performed by: FAMILY MEDICINE

## 2022-04-12 RX ORDER — FLUCONAZOLE 150 MG/1
150 TABLET ORAL ONCE
Qty: 1 TABLET | Refills: 0 | Status: SHIPPED | OUTPATIENT
Start: 2022-04-12 | End: 2022-04-12

## 2022-04-12 RX ORDER — AMOXICILLIN AND CLAVULANATE POTASSIUM 400; 57 MG/5ML; MG/5ML
875 POWDER, FOR SUSPENSION ORAL 2 TIMES DAILY
Qty: 218 ML | Refills: 0 | Status: SHIPPED | OUTPATIENT
Start: 2022-04-12 | End: 2022-04-22

## 2022-04-12 NOTE — PROGRESS NOTES
"robinson is a 31 year old who is being evaluated via a billable telephone visit.      What phone number would you like to be contacted at? [phone]  How would you like to obtain your AVS? MyChart     1:04 PM    A/p:      ICD-10-CM    1. Dental infection  K04.7 amoxicillin-clavulanate (AUGMENTIN) 400-57 MG/5ML suspension   2. Yeast vaginitis  B37.3 fluconazole (DIFLUCAN) 150 MG tablet     Suspicious for dental infection.  Emphasized to pt importance of dental follow up.  She will scheduled    Typically gets yeast vaginitis following antibiotic, script sent      Subjective   robinson is a 31 year old who presents for the following health issues    HPI     * tooth pain for the last 2-3 days, headache     Was supposed to go to the dentist last week but got covid so needs to     Had a cracked tooth a while ago.  Got \"super infected\" and was treated with antibiotic.  Dental pain resolved following her treatment    No fever, has a dull pain in the tooth and a headache.  No swelling or drainage from the tooth.  Feels like pain is not nearly as bad as it was but is getting worse.  Was told by the dentist that they could not treat the tooth if it was infected.    Planning on calling to reschedule in the next 1-2 weeks.           Review of Systems   Constitutional, HEENT, cardiovascular, pulmonary, gi and gu systems are negative, except as otherwise noted.      Objective           Vitals:  No vitals were obtained today due to virtual visit.    Physical Exam   healthy, alert and no distress  PSYCH: Alert and oriented times 3; coherent speech, normal   rate and volume, able to articulate logical thoughts, able   to abstract reason, no tangential thoughts, no hallucinations   or delusions  Her affect is normal  RESP: No cough, no audible wheezing, able to talk in full sentences  Remainder of exam unable to be completed due to telephone visits    Epic reviewed          1:10 PM      Phone call duration: 7 minutes  "

## 2022-05-17 ENCOUNTER — HOSPITAL ENCOUNTER (EMERGENCY)
Facility: CLINIC | Age: 32
Discharge: HOME | End: 2022-05-17
Attending: PHYSICIAN ASSISTANT | Admitting: PHYSICIAN ASSISTANT
Payer: COMMERCIAL

## 2022-05-17 VITALS
DIASTOLIC BLOOD PRESSURE: 88 MMHG | HEART RATE: 72 BPM | OXYGEN SATURATION: 99 % | BODY MASS INDEX: 23.48 KG/M2 | SYSTOLIC BLOOD PRESSURE: 140 MMHG | WEIGHT: 140 LBS | TEMPERATURE: 98 F | RESPIRATION RATE: 12 BRPM

## 2022-05-17 DIAGNOSIS — K08.89 PAIN, DENTAL: ICD-10-CM

## 2022-05-17 PROCEDURE — 99213 OFFICE O/P EST LOW 20 MIN: CPT | Performed by: PHYSICIAN ASSISTANT

## 2022-05-17 PROCEDURE — G0463 HOSPITAL OUTPT CLINIC VISIT: HCPCS | Performed by: PHYSICIAN ASSISTANT

## 2022-05-17 RX ORDER — AMOXICILLIN 400 MG/5ML
879 POWDER, FOR SUSPENSION ORAL 2 TIMES DAILY
Qty: 154 ML | Refills: 0 | Status: SHIPPED | OUTPATIENT
Start: 2022-05-17 | End: 2022-05-24
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
## 2022-06-01 ENCOUNTER — VIRTUAL VISIT (OUTPATIENT)
Dept: INTERNAL MEDICINE | Facility: CLINIC | Age: 32
End: 2022-06-01
Payer: COMMERCIAL

## 2022-06-01 DIAGNOSIS — J02.0 STREPTOCOCCAL SORE THROAT: Primary | ICD-10-CM

## 2022-06-01 PROCEDURE — 99213 OFFICE O/P EST LOW 20 MIN: CPT | Mod: 95 | Performed by: NURSE PRACTITIONER

## 2022-06-01 RX ORDER — AMOXICILLIN 400 MG/5ML
875 POWDER, FOR SUSPENSION ORAL 2 TIMES DAILY
Qty: 218 ML | Refills: 0 | Status: SHIPPED | OUTPATIENT
Start: 2022-06-01 | End: 2022-06-07

## 2022-06-01 NOTE — PROGRESS NOTES
robinson is a 31 year old who is being evaluated via a billable video visit.      How would you like to obtain your AVS? Moise  Call [phone]    Video Start Time: 11:54 AM    Assessment & Plan   Problem List Items Addressed This Visit    None     Visit Diagnoses     Streptococcal sore throat    -  Primary    Relevant Medications    amoxicillin (AMOXIL) 400 MG/5ML suspension         Suspected strep given recent exposure elect to treat.Patient advised if symptoms persist, worsen or new symptoms arise they are to seek medical care.  All patients questions addressed. Patient verbalized understanding and agreement with plan.              Tobacco Cessation:   reports that she has been smoking cigarettes. She has a 3.00 pack-year smoking history. She has never used smokeless tobacco.        Current Outpatient Medications:      acetaminophen (TYLENOL) 325 MG tablet, Take 500 mg by mouth every 4 hours as needed for mild pain , Disp: , Rfl:      amoxicillin (AMOXIL) 400 MG/5ML suspension, Take 10.9 mLs (875 mg) by mouth 2 times daily for 10 days, Disp: 218 mL, Rfl: 0     escitalopram (LEXAPRO) 10 MG tablet, Take 1 tablet (10 mg) by mouth daily, Disp: 30 tablet, Rfl: 1     ibuprofen (ADVIL/MOTRIN) 400 MG tablet, Take 600 mg by mouth every 6 hours as needed for moderate pain or pain , Disp: , Rfl:      levonorgestrel-ethinyl estradiol (SEASONALE) 0.15-0.03 MG tablet, Take 1 tablet by mouth daily, Disp: 91 tablet, Rfl: 3  Past Medical History:   Diagnosis Date     Chickenpox      Depression      Other abnormal heart sounds     present since birth         No follow-ups on file.    Aida Zurita NP  [facility]   robinson is a 31 year old who presents for the following health issues     HPI     Acute Illness  Acute illness concerns: daughter tested positive for strep. Patient had covid 4.2022  Onset/Duration: 2 days ago  Symptoms:  Fever: no  Chills/Sweats: no  Headache (location?):  YES  Sinus Pressure: no  Conjunctivitis:  no  Ear Pain: no  Rhinorrhea: no  Congestion: YES  Sore Throat: YES  Cough: no  Wheeze: no  Decreased Appetite: no  Nausea: no  Vomiting: no  Diarrhea: no  Dysuria/Freq.: no  Dysuria or Hematuria: no  Fatigue/Achiness: YES  Sick/Strep Exposure: YES  Therapies tried and outcome: ibuprofen and tylenol      Review of Systems   Unremarkable other than listed above and below         Objective           Vitals:  No vitals were obtained today due to virtual visit.    Physical Exam   GENERAL: Healthy, alert and no distress  EYES: Eyes grossly normal to inspection.  No discharge or erythema, or obvious scleral/conjunctival abnormalities.  RESP: No audible wheeze, cough, or visible cyanosis.  No visible retractions or increased work of breathing.    SKIN: Visible skin clear. No significant rash, abnormal pigmentation or lesions.  NEURO: Cranial nerves grossly intact.  Mentation and speech appropriate for age.  PSYCH: Mentation appears normal, affect normal/bright, judgement and insight intact, normal speech and appearance well-groomed.                Video-Visit Details    Type of service:  Video Visit    Video End Time:12:00 PM    Originating Location (pt. Location): Home    Distant Location (provider location):  [facility]     Platform used for Video Visit: Eduardo

## 2022-06-05 DIAGNOSIS — F41.1 GAD (GENERALIZED ANXIETY DISORDER): ICD-10-CM

## 2022-06-07 ENCOUNTER — VIRTUAL VISIT (OUTPATIENT)
Dept: INTERNAL MEDICINE | Facility: CLINIC | Age: 32
End: 2022-06-07
Payer: COMMERCIAL

## 2022-06-07 DIAGNOSIS — F41.1 GENERALIZED ANXIETY DISORDER: Primary | ICD-10-CM

## 2022-06-07 PROCEDURE — 99213 OFFICE O/P EST LOW 20 MIN: CPT | Mod: 95 | Performed by: PHYSICIAN ASSISTANT

## 2022-06-07 RX ORDER — ESCITALOPRAM OXALATE 20 MG/1
20 TABLET ORAL DAILY
Qty: 30 TABLET | Refills: 1 | Status: SHIPPED | OUTPATIENT
Start: 2022-06-07 | End: 2022-10-04

## 2022-06-07 RX ORDER — HYDROXYZINE PAMOATE 25 MG/1
25 CAPSULE ORAL 3 TIMES DAILY PRN
Qty: 30 CAPSULE | Refills: 1 | Status: SHIPPED | OUTPATIENT
Start: 2022-06-07 | End: 2022-10-04

## 2022-06-07 RX ORDER — ESCITALOPRAM OXALATE 10 MG/1
10 TABLET ORAL DAILY
Qty: 30 TABLET | Refills: 1 | OUTPATIENT
Start: 2022-06-07

## 2022-06-07 ASSESSMENT — ANXIETY QUESTIONNAIRES
2. NOT BEING ABLE TO STOP OR CONTROL WORRYING: NEARLY EVERY DAY
GAD7 TOTAL SCORE: 18
6. BECOMING EASILY ANNOYED OR IRRITABLE: NEARLY EVERY DAY
1. FEELING NERVOUS, ANXIOUS, OR ON EDGE: MORE THAN HALF THE DAYS
IF YOU CHECKED OFF ANY PROBLEMS ON THIS QUESTIONNAIRE, HOW DIFFICULT HAVE THESE PROBLEMS MADE IT FOR YOU TO DO YOUR WORK, TAKE CARE OF THINGS AT HOME, OR GET ALONG WITH OTHER PEOPLE: VERY DIFFICULT
7. FEELING AFRAID AS IF SOMETHING AWFUL MIGHT HAPPEN: NEARLY EVERY DAY
5. BEING SO RESTLESS THAT IT IS HARD TO SIT STILL: MORE THAN HALF THE DAYS
GAD7 TOTAL SCORE: 18
3. WORRYING TOO MUCH ABOUT DIFFERENT THINGS: NEARLY EVERY DAY

## 2022-06-07 ASSESSMENT — ENCOUNTER SYMPTOMS: NERVOUS/ANXIOUS: 1

## 2022-06-07 ASSESSMENT — PATIENT HEALTH QUESTIONNAIRE - PHQ9
SUM OF ALL RESPONSES TO PHQ QUESTIONS 1-9: 17
5. POOR APPETITE OR OVEREATING: MORE THAN HALF THE DAYS

## 2022-06-07 NOTE — COMMUNITY RESOURCES LIST (ENGLISH)
06/07/2022   [facility] Outpatient Clinics  Ivory Gillis  For questions about this resource list or additional care needs, please contact your primary care clinic or care manager.  Phone: [phone]   Email: N/A   Address: [address] [place] [address]   Hours: N/A        Hotlines and Helplines       Hotline - Crisis help  1  [employer]. - 24-Hour Helpline Distance: 15.85 miles      COVID-19 Status: Regular Operations   [address] [address] [place] [address]  Language: Danish, English, Hmong, Irish, Tajik  Hours: Mon - Sun Open 24 Hours   Phone: (280) 767-1054 Email: frvxkwro0d@[employer] Website: http://[email or web address]     2  [facility] Distance: 16.11 miles      COVID-19 Status: Phone/Virtual   [address] [facility] [address] [place] [address]  Language: English  Hours: Mon - Sun Open 24 Hours   Phone: (354) 768-1599 Website: https://www."[employer].org/location/[employer]/          Mental Health       Individual counseling  3  [facility] Distance: 0.35 miles      COVID-19 Status: Regular Operations, COVID-19 Status: Phone/Virtual   [address] [facility] [address] [facility] [address] [place] [address]  Language: English  Hours: Mon - Fri 8:00 AM - 4:30 PM  Fees: Insurance, Self Pay, Sliding Fee   Phone: (703) 626-7195 Email: info@"[employer].org Website: http://www."[employer].org/location/[facility]/     4  [facility] Distance: 0.39 miles      COVID-19 Status: Regular Operations, COVID-19 Status: Phone/Virtual   [address] [place] [address]  Language: English  Hours: Mon - Fri Appt. Only  Fees: Insurance, Self Pay, Sliding Fee   Phone: (260) 752-9636 Email: @[employer].org Website: https://[facility].[email or web address]/     Mental health crisis care  5  [employer] [facility] - Crisis Assessment and Stabilization Services Distance: 16.37 miles      COVID-19 Status: Regular Operations, COVID-19  Status: Phone/Virtual   [address] [address] [facility] [address] [place] [address]  Language: English  Hours: Mon - Sun Open 24 Hours  Fees: Insurance, Self Pay, Sliding Fee   Phone: (441) 337-5698 Email: info@[email or web address]ipsync Website: https://[email or web address]ync/location/[facility]/     6  [facility] Distance: 23.68 miles      COVID-19 Status: Regular Operations, COVID-19 Status: Phone/Virtual   [address] [facility] [address] [place] [address]  Language: English, Citizen of [place]  Hours: Mon - Fri 9:00 AM - 5:00 PM  Fees: Insurance, Self Pay   Phone: (418) 366-8019 Website: http://[employer]/index.php     Mental health support group  7  [facility] Distance: 17.55 miles      COVID-19 Status: Phone/Virtual   [address] [place] [address]  Language: English  Hours: Mon - Fri 9:00 AM - 3:00 PM  Fees: Free   Phone: (378) 971-9402 Website: http://www.[employer].org/     8  [facility] - Caregiver Support Groups Distance: 18.07 miles      COVID-19 Status: Service Unavailable   [address] [place] [address]  Language: English  Hours: Wed 1:00 PM - 3:00 PM  Fees: Insurance, Self Pay   Phone: (519) 849-3594 Email: familymeans@[employer].org Website: https://www.familymeans.org/          Important Numbers & Websites       Emergency Services   911  [facility] Services   311  Poison Control   (456) 779-8503  Suicide Prevention Lifeline   (785) 369-6412 (TALK)  Child Abuse Hotline   (549) 900-6048 (4-A-Child)  Sexual Assault Hotline   (219) 468-6195 (HOPE)  National Runaway Safeline   (872) 380-6545 (RUNAWAY)  All-Options Talkline   (253) 780-8519  Substance Abuse Referral   (648) 470-9018 (HELP)

## 2022-06-07 NOTE — TELEPHONE ENCOUNTER
Reason for Call:  Medication or medication refill:    Do you use a [facility]?  Name of the pharmacy and phone number for the current request:  [facility] - [phone]    Name of the medication requested: ANXIETY PRESCRIPTION     Other request: FYI: PATIENT HAS SCHEDULED A VIRTUAL VISIT WITH MD JESSICA DUE TO PCP ZECHARIAH FARRIS NOT BEING AVAILABLE VIRTUALLY OR IN PERSON WITHIN PATIENT'S DESIRED WINDOW.     Can we leave a detailed message on this number? Not Applicable    Phone number patient can be reached at: Home number on file [phone] (home)    Best Time: NA    Call taken on 6/7/2022 at 11:24 AM by Annika Lion

## 2022-06-07 NOTE — PROGRESS NOTES
robinson is a 31 year old who is being evaluated via a billable video visit.      How would you like to obtain your AVS? MyChart  If the video visit is dropped, the invitation should be resent by: Text to cell phone: [phone]  Will anyone else be joining your video visit? No      Video Start Time: 4: 52 pm    Assessment & Plan     Generalized anxiety disorder    - hydrOXYzine (VISTARIL) 25 MG capsule; Take 1 capsule (25 mg) by mouth 3 times daily as needed for anxiety  - escitalopram (LEXAPRO) 20 MG tablet; Take 1 tablet (20 mg) by mouth daily             Tobacco Cessation:   reports that she has been smoking cigarettes. She has a 3.00 pack-year smoking history. She has never used smokeless tobacco.  Tobacco Cessation Action Plan: per PCP        Return in about 3 weeks (around 6/28/2022) for video visit, regular primary provider.    Jenni Tejada PA-C  [facility]   robinson is a 31 year old who presents for the following health issues     Anxiety         Anxiety Follow-Up    How are you doing with your anxiety since your last visit? Worsened Meds worked at first but now not cutting it     Are you having other symptoms that might be associated with anxiety? No    Have you had a significant life event? Health Concerns Mom diagnosed with cancer- lung     Are you feeling depressed? No    Do you have any concerns with your use of alcohol or other drugs? No    Social History     Tobacco Use     Smoking status: Current Every Day Smoker     Packs/day: 0.50     Years: 6.00     Pack years: 3.00     Types: Cigarettes     Smokeless tobacco: Never Used   Vaping Use     Vaping Use: Never used   Substance Use Topics     Alcohol use: Yes     Comment: occ     Drug use: No     ANTOINE-7 SCORE 2/24/2022 4/5/2022 6/7/2022   Total Score 6 19 18     PHQ 2/24/2022 4/5/2022 6/7/2022   PHQ-9 Total Score 4 18 17   Q9: Thoughts of better off dead/self-harm past 2 weeks Not at all Not at all  Not at all     ANTOINE-7  6/7/2022   1. Feeling nervous, anxious, or on edge 2   2. Not being able to stop or control worrying 3   3. Worrying too much about different things 3   4. Trouble relaxing 2   5. Being so restless that it is hard to sit still 2   6. Becoming easily annoyed or irritable 3   7. Feeling afraid, as if something awful might happen 3   ANTOINE-7 Total Score 18   If you checked any problems, how difficult have they made it for you to do your work, take care of things at home, or get along with other people? Very difficult             Review of Systems   Psychiatric/Behavioral: The patient is nervous/anxious.       Constitutional, HEENT, cardiovascular, pulmonary, gi and gu systems are negative, except as otherwise noted.      Objective           Vitals:  No vitals were obtained today due to virtual visit.    Physical Exam   GENERAL: Healthy, alert and no distress  EYES: Eyes grossly normal to inspection.  No discharge or erythema, or obvious scleral/conjunctival abnormalities.  RESP: No audible wheeze, cough, or visible cyanosis.  No visible retractions or increased work of breathing.    SKIN: Visible skin clear. No significant rash, abnormal pigmentation or lesions.  NEURO: Cranial nerves grossly intact.  Mentation and speech appropriate for age.  PSYCH: Mentation appears normal, affect normal/bright, judgement and insight intact, normal speech and appearance well-groomed.                Video-Visit Details    Type of service:  Video Visit    Video End Time:5:00 PM    Originating Location (pt. Location): Home    Distant Location (provider location):  [facility]     Platform used for Video Visit: Chug

## 2022-06-08 ENCOUNTER — MYC MEDICAL ADVICE (OUTPATIENT)
Dept: FAMILY MEDICINE | Facility: CLINIC | Age: 32
End: 2022-06-08
Payer: COMMERCIAL

## 2022-06-08 NOTE — TELEPHONE ENCOUNTER
"There is no \"listing\" I am aware of for emotional support animals.  I can write a letter but that does not allow her dog to go into businesses with her or be treated like a service animal    The letter I can write is sometimes used for housing but it is just a letter of support.  I cannot fill out a form certifying the patient is disabled under the ADA and requires a service animal.    Please let her know this.  It is possible that I am not able to do what she is looking for.  If she still wants to visit virtual would be okay    Rochelle Carlisle, DO        "

## 2022-06-10 ENCOUNTER — VIRTUAL VISIT (OUTPATIENT)
Dept: FAMILY MEDICINE | Facility: CLINIC | Age: 32
End: 2022-06-10
Payer: COMMERCIAL

## 2022-06-10 DIAGNOSIS — Z63.79 STRESS DUE TO ILLNESS OF FAMILY MEMBER: ICD-10-CM

## 2022-06-10 DIAGNOSIS — F41.1 GENERALIZED ANXIETY DISORDER: Primary | ICD-10-CM

## 2022-06-10 DIAGNOSIS — F32.A DEPRESSION, UNSPECIFIED DEPRESSION TYPE: ICD-10-CM

## 2022-06-10 PROCEDURE — 99214 OFFICE O/P EST MOD 30 MIN: CPT | Mod: 95 | Performed by: NURSE PRACTITIONER

## 2022-06-10 ASSESSMENT — PATIENT HEALTH QUESTIONNAIRE - PHQ9
SUM OF ALL RESPONSES TO PHQ QUESTIONS 1-9: 17
5. POOR APPETITE OR OVEREATING: NEARLY EVERY DAY

## 2022-06-10 ASSESSMENT — ANXIETY QUESTIONNAIRES
GAD7 TOTAL SCORE: 19
1. FEELING NERVOUS, ANXIOUS, OR ON EDGE: NEARLY EVERY DAY
GAD7 TOTAL SCORE: 19
IF YOU CHECKED OFF ANY PROBLEMS ON THIS QUESTIONNAIRE, HOW DIFFICULT HAVE THESE PROBLEMS MADE IT FOR YOU TO DO YOUR WORK, TAKE CARE OF THINGS AT HOME, OR GET ALONG WITH OTHER PEOPLE: VERY DIFFICULT
3. WORRYING TOO MUCH ABOUT DIFFERENT THINGS: NEARLY EVERY DAY
2. NOT BEING ABLE TO STOP OR CONTROL WORRYING: NEARLY EVERY DAY
5. BEING SO RESTLESS THAT IT IS HARD TO SIT STILL: MORE THAN HALF THE DAYS
7. FEELING AFRAID AS IF SOMETHING AWFUL MIGHT HAPPEN: NEARLY EVERY DAY
6. BECOMING EASILY ANNOYED OR IRRITABLE: MORE THAN HALF THE DAYS

## 2022-06-10 ASSESSMENT — ENCOUNTER SYMPTOMS: NERVOUS/ANXIOUS: 1

## 2022-06-10 NOTE — PATIENT INSTRUCTIONS
Letter uploaded to [employer].     We will have a signed copy at the  available for you to  end of next week. Someone will notify you when it is ready. Please contact us if you don't hear from us by Thursday morning.

## 2022-06-10 NOTE — LETTER
Shayna 10, 2022      Michelle Torres  [address]   [facility] [address]        To Whom It May Concern:    Michelle Torres is a longtime patient at our clinic and has been under our care since 2006. I am familiar with her history and with the mental/emotional illness she has.    Due to the mental/emotional illness, Michelle has certain limitations regarding coping with stress and anxiety.  In order to help alleviate these difficulties and to enhance her ability to live independently and to fully use and enjoy the dwelling unit you own and/or administer, I am recommending she be allowed to have her dog with her as an emotional support animal that will assist Michelle in coping with her mental/emotional illness.      Sincerely,        RUPINDER Del Real CNP

## 2022-06-10 NOTE — PROGRESS NOTES
Michelle is a 31 year old who is being evaluated via a billable telephone visit.      What phone number would you like to be contacted at? [phone]  How would you like to obtain your AVS? [facility]    Assessment & Plan     Generalized anxiety disorder  Letter provided as a recommendation that her dog stay with her for support of her mental/emotional illnes, but explained to patient that as Dr. Carlisle said, we would not be able to label this patient as a dog to assist with a disability. Patient understood. I do believe given the situation, that her dog does provide emotional support and stress relief given her hx.    Depression, unspecified depression type  See above.     Stress due to illness of family member  Mom recently diagnosed with cancer.               Patient Instructions   Letter uploaded to [employer].     We will have a signed copy at the  available for you to  end of next week. Someone will notify you when it is ready. Please contact us if you don't hear from us by Thursday morning.           Return if symptoms worsen or fail to improve.    RUPINDER Del Real [facility]   Michelle is a 31 year old who presents for the following health issues     HPI     Abnormal Mood Symptoms  Onset/Duration: ongoing  Description: a lot of new life events  Depression (if yes, do PHQ-9): YES  Anxiety (if yes, do ANTOINE-7): YES  Accompanying Signs & Symptoms:  Still participating in activities that you used to enjoy: YES  Fatigue: YES  Irritability: YES  Difficulty concentrating: YES  Changes in appetite: no  Problems with sleep: no  Heart racing/beating fast: no  Abnormally elevated, expansive, or irritable mood: no  Persistently increased activity or energy: no  Thoughts of hurting yourself or others: no  History:  Recent stress or major life event: YES  Prior depression or anxiety: yes  Family history of depression or anxiety: YES  Alcohol/drug use: no  Difficulty  "sleeping: no  Precipitating or alleviating factors: anxiety medication  Therapies tried and outcome:  medication(s) Lexapro (escitalopram) and hydroxyzine  PHQ 4/5/2022 6/7/2022 6/10/2022   PHQ-9 Total Score 18 17 17   Q9: Thoughts of better off dead/self-harm past 2 weeks Not at all Not at all Not at all     ANTOINE-7 SCORE 4/5/2022 6/7/2022 6/10/2022   Total Score 19 18 19     Additional provider notes: History of anxiety and depression. Mom recently diagnosed with cancer and she is relocating to help her mom out. States her dog helps her cope with anxiety and depression especially at night when she is home alone with kids. PMH reveals hx of trauma. The location she found doesn't accept pets without a letter from her provider. She states her anxiety has increased since mom's illness.    Allergies   Allergen Reactions     Wasp Venom Protein Anaphylaxis     Bees Hives     Egg  [Chicken-Derived Products (Egg)] Hives     Eggs Nausea and Vomiting and Diarrhea     No Clinical Screening - See Comments      Other reaction(s): *Unknown  Kidney failure from an iron supplement     Oxycodone Hives     Rocephin [Ceftriaxone] Other (See Comments)     \"kidney shut down\"     Current Outpatient Medications   Medication     acetaminophen (TYLENOL) 325 MG tablet     escitalopram (LEXAPRO) 20 MG tablet     hydrOXYzine (VISTARIL) 25 MG capsule     ibuprofen (ADVIL/MOTRIN) 400 MG tablet     levonorgestrel-ethinyl estradiol (SEASONALE) 0.15-0.03 MG tablet     No current facility-administered medications for this visit.     Past Medical History:   Diagnosis Date     Chickenpox      Depression      Other abnormal heart sounds     present since birth         Review of Systems   Psychiatric/Behavioral: Positive for mood changes. The patient is nervous/anxious.             Objective           Vitals:  No vitals were obtained today due to virtual visit.    Physical Exam   healthy, alert, mild distress and cooperative  PSYCH: Alert and oriented " times 3; coherent speech, normal   rate and volume, able to articulate logical thoughts, able   to abstract reason, no tangential thoughts, no hallucinations   or delusions  Her affect is normal and pleasant  RESP: No cough, no audible wheezing, able to talk in full sentences  Remainder of exam unable to be completed due to telephone visits                Phone call duration: 4 minutes

## 2022-06-24 DIAGNOSIS — Z30.011 ENCOUNTER FOR INITIAL PRESCRIPTION OF CONTRACEPTIVE PILLS: ICD-10-CM

## 2022-06-24 RX ORDER — LEVONORGESTREL AND ETHINYL ESTRADIOL 0.15-0.03
KIT ORAL
Qty: 91 TABLET | Refills: 0 | Status: SHIPPED | OUTPATIENT
Start: 2022-06-24 | End: 2022-10-04

## 2022-07-04 ENCOUNTER — MYC MEDICAL ADVICE (OUTPATIENT)
Dept: FAMILY MEDICINE | Facility: CLINIC | Age: 32
End: 2022-07-04

## 2022-07-05 ENCOUNTER — VIRTUAL VISIT (OUTPATIENT)
Dept: FAMILY MEDICINE | Facility: CLINIC | Age: 32
End: 2022-07-05
Payer: COMMERCIAL

## 2022-07-05 DIAGNOSIS — J02.0 STREPTOCOCCAL SORE THROAT: Primary | ICD-10-CM

## 2022-07-05 PROCEDURE — 99213 OFFICE O/P EST LOW 20 MIN: CPT | Mod: 95 | Performed by: FAMILY MEDICINE

## 2022-07-05 RX ORDER — AMOXICILLIN 400 MG/5ML
400 POWDER, FOR SUSPENSION ORAL 2 TIMES DAILY
Qty: 100 ML | Refills: 0 | Status: SHIPPED | OUTPATIENT
Start: 2022-07-05 | End: 2022-07-25

## 2022-07-05 RX ORDER — AMOXICILLIN 400 MG/5ML
800 POWDER, FOR SUSPENSION ORAL 2 TIMES DAILY
Qty: 200 ML | Refills: 0 | Status: SHIPPED | OUTPATIENT
Start: 2022-07-05 | End: 2022-07-25

## 2022-07-05 NOTE — PROGRESS NOTES
Michelle is a 32 year old who is being evaluated via a billable video visit.      How would you like to obtain your AVS? MyChart  If the video visit is dropped, the invitation should be resent by: [phone]  Will anyone else be joining your video visit? No        Assessment & Plan     Streptococcal sore throat  Patient with sore throat very suspicious for strep given history of exposure we will check for strep and COVID and cover with amoxicillin in the meantime.  Recheck in a few days if not improving sooner if worse  - Symptomatic; Yes; 7/2/2022 COVID-19 Virus (Coronavirus) by PCR  - Streptococcus A Rapid Screen w/Reflex to PCR - Clinic Collect  - amoxicillin (AMOXIL) 400 MG/5ML suspension; Take 5 mLs (400 mg) by mouth 2 times daily                 No follow-ups on file.    Yoel Garvey MD  [facility]    Subjective   Michelle is a 32 year old, presenting for the following health issues: Patient's had a sore throat since Saturday.  No fevers chills or sweats.  Slight congestion slight cough.  No chest pain shortness of breath rashes.  She has been exposed to strep at least by 2 people at school and her sister has a sore throat.  She did a negative home COVID test.  Denies being pregnant  Pharyngitis      HPI     ST since yesterday. Body aches 7/2. 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 Prefers liquid form of treatment since it hurts so much to swallow.        Review of Systems         Objective           Vitals:  No vitals were obtained today due to virtual visit.    Physical Exam   GENERAL: Healthy, alert and no distress  EYES: Eyes grossly normal to inspection.  No discharge or erythema, or obvious scleral/conjunctival abnormalities.  RESP: No audible wheeze, cough, or visible cyanosis.  No visible retractions or increased work of breathing.    SKIN: Visible skin clear. No significant rash, abnormal pigmentation or lesions.  NEURO: Cranial nerves grossly intact.   Mentation and speech appropriate for age.  PSYCH: Mentation appears normal, affect normal/bright, judgement and insight intact, normal speech and appearance well-groomed.                Video-Visit Details    Video Start Time: 205    Type of service:  Video Visit    Video End Time:211    Originating Location (pt. Location): Home    Distant Location (provider location):  [facility]     Platform used for Video Visit: Eduardo    .  ..

## 2022-07-25 ENCOUNTER — VIRTUAL VISIT (OUTPATIENT)
Dept: FAMILY MEDICINE | Facility: CLINIC | Age: 32
End: 2022-07-25
Payer: COMMERCIAL

## 2022-07-25 ENCOUNTER — TELEPHONE (OUTPATIENT)
Dept: FAMILY MEDICINE | Facility: CLINIC | Age: 32
End: 2022-07-25

## 2022-07-25 DIAGNOSIS — K04.7 DENTAL INFECTION: Primary | ICD-10-CM

## 2022-07-25 PROCEDURE — 99213 OFFICE O/P EST LOW 20 MIN: CPT | Mod: 95 | Performed by: NURSE PRACTITIONER

## 2022-07-25 RX ORDER — FLUCONAZOLE 150 MG/1
150 TABLET ORAL ONCE
Qty: 1 TABLET | Refills: 0 | Status: SHIPPED | OUTPATIENT
Start: 2022-07-25 | End: 2022-07-25

## 2022-07-25 RX ORDER — AMOXICILLIN AND CLAVULANATE POTASSIUM 400; 57 MG/5ML; MG/5ML
25 POWDER, FOR SUSPENSION ORAL 2 TIMES DAILY
Qty: 140 ML | Refills: 0 | Status: SHIPPED | OUTPATIENT
Start: 2022-07-25 | End: 2022-08-01

## 2022-07-25 ASSESSMENT — PATIENT HEALTH QUESTIONNAIRE - PHQ9
SUM OF ALL RESPONSES TO PHQ QUESTIONS 1-9: 12
SUM OF ALL RESPONSES TO PHQ QUESTIONS 1-9: 12
10. IF YOU CHECKED OFF ANY PROBLEMS, HOW DIFFICULT HAVE THESE PROBLEMS MADE IT FOR YOU TO DO YOUR WORK, TAKE CARE OF THINGS AT HOME, OR GET ALONG WITH OTHER PEOPLE: NOT DIFFICULT AT ALL

## 2022-07-25 NOTE — PROGRESS NOTES
Michelle is a 32 year old who is being evaluated via a billable video visit.      How would you like to obtain your AVS? MyChart  If the video visit is dropped, the invitation should be resent by: Text to cell phone: [phone]  Will anyone else be joining your video visit? No          Assessment & Plan     Dental infection  Has dental pain, cannot get into dentist for another 2 weeks. Will do antibiotic, would like liquid form. Also hx of yeast infections with antibiotics.   - amoxicillin-clavulanate (AUGMENTIN) 400-57 MG/5ML suspension; Take 10 mLs (800 mg) by mouth 2 times daily for 7 days  - fluconazole (DIFLUCAN) 150 MG tablet; Take 1 tablet (150 mg) by mouth once for 1 dose    Return in about 1 week (around 8/1/2022), or if symptoms worsen or fail to improve.    RUPINDER Woodson, NP-C  [facility]   Michelle is a 32 year old accompanied by her self, presenting for the following health issues:  Dental Pain (Infected tooth - going in two weeks but having pain now)      History of Present Illness       Reason for visit:  Infected tooth  Symptom onset:  1-3 days ago  Symptoms include:  Pain  Symptom intensity:  Moderate  Symptom progression:  Worsening  Had these symptoms before:  Yes  Has tried/received treatment for these symptoms:  Yes  Previous treatment was successful:  Yes  Prior treatment description:  Antibiotic  What makes it worse:  No  What makes it better:  Oragel mouth wash    She eats 4 or more servings of fruits and vegetables daily.She consumes 0 sweetened beverage(s) daily.She exercises with enough effort to increase her heart rate 30 to 60 minutes per day.  She exercises with enough effort to increase her heart rate 7 days per week.   She is taking medications regularly.    Today's PHQ-9         PHQ-9 Total Score: 12    PHQ-9 Q9 Thoughts of better off dead/self-harm past 2 weeks :   Not at all    How difficult have these problems made it for you to do your work,  take care of things at home, or get along with other people: Not difficult at all        Being seen in 2 weeks to have tooth pulled. Lots of pain, swollen            Review of Systems   Constitutional, HEENT, cardiovascular, pulmonary, gi and gu systems are negative, except as otherwise noted.      Objective           Vitals:  No vitals were obtained today due to virtual visit.    Physical Exam   GENERAL: Healthy, alert and no distress  EYES: Eyes grossly normal to inspection.  No discharge or erythema, or obvious scleral/conjunctival abnormalities.  RESP: No audible wheeze, cough, or visible cyanosis.  No visible retractions or increased work of breathing.    SKIN: Visible skin clear. No significant rash, abnormal pigmentation or lesions.  NEURO: Cranial nerves grossly intact.  Mentation and speech appropriate for age.  PSYCH: Mentation appears normal, affect normal/bright, judgement and insight intact, normal speech and appearance well-groomed.    No lab reviewed today            Video-Visit Details    Video Start Time: 2:11 PM    Type of service:  Video Visit    Video End Time:2:17 PM    Originating Location (pt. Location): Home    Distant Location (provider location): Home secure location    Platform used for Video Visit: Gunnar Friedman.

## 2022-10-04 ENCOUNTER — VIRTUAL VISIT (OUTPATIENT)
Dept: FAMILY MEDICINE | Facility: CLINIC | Age: 32
End: 2022-10-04
Payer: COMMERCIAL

## 2022-10-04 ENCOUNTER — MYC REFILL (OUTPATIENT)
Dept: FAMILY MEDICINE | Facility: CLINIC | Age: 32
End: 2022-10-04

## 2022-10-04 DIAGNOSIS — Z30.011 ENCOUNTER FOR INITIAL PRESCRIPTION OF CONTRACEPTIVE PILLS: ICD-10-CM

## 2022-10-04 DIAGNOSIS — F41.1 GENERALIZED ANXIETY DISORDER: ICD-10-CM

## 2022-10-04 PROCEDURE — 99213 OFFICE O/P EST LOW 20 MIN: CPT | Mod: 95 | Performed by: FAMILY MEDICINE

## 2022-10-04 RX ORDER — HYDROXYZINE PAMOATE 25 MG/1
25 CAPSULE ORAL 3 TIMES DAILY PRN
Qty: 30 CAPSULE | Refills: 1 | Status: SHIPPED | OUTPATIENT
Start: 2022-10-04 | End: 2023-02-01

## 2022-10-04 RX ORDER — SERTRALINE HYDROCHLORIDE 100 MG/1
TABLET, FILM COATED ORAL
Qty: 30 TABLET | Refills: 1 | Status: SHIPPED | OUTPATIENT
Start: 2022-10-04 | End: 2023-02-01

## 2022-10-04 NOTE — PROGRESS NOTES
Michelle is a 32 year old who is being evaluated via a billable video visit.      How would you like to obtain your AVS? MyChart  If the video visit is dropped, the invitation should be resent by: Text to cell phone: [phone]  Will anyone else be joining your video visit? No        A/p:      ICD-10-CM    1. Generalized anxiety disorder  F41.1 sertraline (ZOLOFT) 100 MG tablet     hydrOXYzine (VISTARIL) 25 MG capsule     Reviewed options.  Pt desired retrial of sertraline.  Dose sent with escalation to previous dosing.    Will refill hydroxyzine as well as she has found that helpful.  Reviewed side effect of drowsiness and recommend caution with driving or combining with alcohol.    Discussed that there are dose escalations available on the sertraline so if she does not find it helpful in 4-5 weeks can consider increased dose.      Subjective   Michelle is a 32 year old, presenting for the following health issues:  No chief complaint on file.      HPI     Stopped taking the escitalopram about 3 weeks ago as she did not feel like it was helping.  Now feeling more anxiety since stopping.  Feeling more anxious and irritable.    Mom with cancer and not doing well.  Wants to have her mental health in order before her mom passes.    Was given a prescription for hydroxyzine which she has been using and feels is helpful.    Recalls previous prescription for fluoxetine at 10mg which she stopped as she did not find it helpful.    Cannot recall why she stopped sertraline, thinks she just no longer wanted to take it.  Does not recall any side effects.    Prefer to consider restarting sertraline          Review of Systems   Constitutional, HEENT, cardiovascular, pulmonary, gi and gu systems are negative, except as otherwise noted.      Objective           Vitals:  No vitals were obtained today due to virtual visit.    Physical Exam   GENERAL: Healthy, alert and no distress  EYES: Eyes grossly normal to inspection.  No discharge  or erythema, or obvious scleral/conjunctival abnormalities.  RESP: No audible wheeze, cough, or visible cyanosis.  No visible retractions or increased work of breathing.    SKIN: Visible skin clear. No significant rash, abnormal pigmentation or lesions.  NEURO: Cranial nerves grossly intact.  Mentation and speech appropriate for age.  PSYCH: Mentation appears normal, affect normal/bright, judgement and insight intact, normal speech and appearance well-groomed.    Epic reviewed            Video-Visit Details    Video Start Time: 7:03 AM      Type of service:  Video Visit    Video End Time:7:14 AM    Originating Location (pt. Location): Home    Distant Location (provider location):  [facility]     Platform used for Video Visit: Pramana

## 2022-10-05 RX ORDER — LEVONORGESTREL AND ETHINYL ESTRADIOL 0.15-0.03
1 KIT ORAL DAILY
Qty: 91 TABLET | Refills: 0 | Status: SHIPPED | OUTPATIENT
Start: 2022-10-05 | End: 2023-01-09

## 2022-12-05 ENCOUNTER — VIRTUAL VISIT (OUTPATIENT)
Dept: FAMILY MEDICINE | Facility: CLINIC | Age: 32
End: 2022-12-05
Payer: COMMERCIAL

## 2022-12-05 DIAGNOSIS — R11.10 VOMITING AND DIARRHEA: Primary | ICD-10-CM

## 2022-12-05 DIAGNOSIS — Z53.9 ERRONEOUS ENCOUNTER--DISREGARD: Primary | ICD-10-CM

## 2022-12-05 DIAGNOSIS — R19.7 VOMITING AND DIARRHEA: Primary | ICD-10-CM

## 2022-12-05 PROCEDURE — 99213 OFFICE O/P EST LOW 20 MIN: CPT | Mod: 95 | Performed by: FAMILY MEDICINE

## 2022-12-05 NOTE — PROGRESS NOTES
"Michelle is a 32 year old who is being evaluated via a billable video visit.      How would you like to obtain your AVS? MyChart  If the video visit is dropped, the invitation should be resent by: requesting this to be phone visit [phone]            A/P:      ICD-10-CM    1. Vomiting and diarrhea  R11.10 Comprehensive metabolic panel (BMP + Alb, Alk Phos, ALT, AST, Total. Bili, TP)    R19.7 CBC with platelets and differential     Enteric Bacteria and Virus Panel by SERGIO Stool     C. difficile Toxin B PCR with reflex to C. difficile Antigen and Toxins A/B EIA        Unclear etiology of vomiting and diarrhea.  Ongoing for 2 weeks, feels it is worsening.  No fever, no ill contacts.  Recent covid infection.    Pt initially thought related to OCP but she has been on this particular pill for >1 year which makes that seem less likely.  selective serotonin reuptake inhibitor is newer but has been on since October with no problems.    Plan studies as ordered and f/u in clinic if needed based on results.    Subjective   Michelle is a 32 year old, presenting for the following health issues:  Nausea      HPI     * nausea and vomiting in the morning for the last 2 weeks, negative home UPT yesterday    Symptoms started 2 weeks ago.  Stomach is upset and feels \"sour\".  Initially was just nausea but progressed to vomiting.  Has diarrhea as well.  This is usually only in the morning as well.    States that she had 10 episodes of diarrhea this morning.  Usually only vomits once in the morning but today vomited 5-6 times.  Feels like symptoms are improved by afternoon although today has had some persistent stomach upset.      Takes her birth control pill at night at 10:30 PM.  Wakes from nausea at 3-4 in morning.  Has been on this particular pill for 1.5 years.  Notes she missed the pill once and felt fine in the morning.   Takes the pill extended cycle.     Has been feeling fatigued, body aches and headache.  Feels like these " headaches are typical for her nothing now.  Had covid around Thanksgiving.    No fevers noted  No regular abdominal pain, perhaps occasional.      Review of Systems   Constitutional, HEENT, cardiovascular, pulmonary, gi and gu systems are negative, except as otherwise noted.      Objective           Vitals:  No vitals were obtained today due to virtual visit.    Physical Exam   GENERAL: Healthy, alert and no distress  EYES: Eyes grossly normal to inspection.  No discharge or erythema, or obvious scleral/conjunctival abnormalities.  RESP: No audible wheeze, cough, or visible cyanosis.  No visible retractions or increased work of breathing.    SKIN: Visible skin clear. No significant rash, abnormal pigmentation or lesions.  NEURO: Cranial nerves grossly intact.  Mentation and speech appropriate for age.  PSYCH: Mentation appears normal, affect normal/bright, judgement and insight intact, normal speech and appearance well-groomed.    Epic reviewed            Video-Visit Details    Video Start Time: 3:08 PM      Type of service:  Video Visit    Video End Time:3:25 PM    Originating Location (pt. Location): Home    Distant Location (provider location):  On-site    Platform used for Video Visit: Gunnar

## 2022-12-26 ENCOUNTER — HEALTH MAINTENANCE LETTER (OUTPATIENT)
Age: 32
End: 2022-12-26

## 2023-01-08 DIAGNOSIS — Z30.011 ENCOUNTER FOR INITIAL PRESCRIPTION OF CONTRACEPTIVE PILLS: ICD-10-CM

## 2023-01-09 RX ORDER — LEVONORGESTREL AND ETHINYL ESTRADIOL 0.15-0.03
KIT ORAL
Qty: 30 TABLET | Refills: 0 | Status: SHIPPED | OUTPATIENT
Start: 2023-01-09 | End: 2023-05-14

## 2023-01-30 DIAGNOSIS — F41.1 GENERALIZED ANXIETY DISORDER: ICD-10-CM

## 2023-02-01 RX ORDER — SERTRALINE HYDROCHLORIDE 100 MG/1
100 TABLET, FILM COATED ORAL DAILY
Qty: 90 TABLET | Refills: 0 | Status: SHIPPED | OUTPATIENT
Start: 2023-02-01 | End: 2023-05-14

## 2023-02-01 RX ORDER — HYDROXYZINE PAMOATE 25 MG/1
25 CAPSULE ORAL 3 TIMES DAILY PRN
Qty: 30 CAPSULE | Refills: 1 | Status: SHIPPED | OUTPATIENT
Start: 2023-02-01

## 2023-02-01 NOTE — TELEPHONE ENCOUNTER
Routing refill request to provider for review/approval because:  PHQ9: 12 on 7/2022    Eduardo Chou RN

## 2023-02-09 ENCOUNTER — TELEPHONE (OUTPATIENT)
Dept: FAMILY MEDICINE | Facility: CLINIC | Age: 33
End: 2023-02-09

## 2023-02-09 ENCOUNTER — E-VISIT (OUTPATIENT)
Dept: FAMILY MEDICINE | Facility: CLINIC | Age: 33
End: 2023-02-09
Payer: COMMERCIAL

## 2023-02-09 DIAGNOSIS — K08.89 PAIN, DENTAL: Primary | ICD-10-CM

## 2023-02-09 PROCEDURE — 99207 PR NON-BILLABLE SERV PER CHARTING: CPT | Performed by: FAMILY MEDICINE

## 2023-02-09 NOTE — TELEPHONE ENCOUNTER
I am aware she sent an evisit, I have not hd a chance to address it.  The time frame for response on Evisits is 24 hours during business days.    If she has no swelling, redness or fever then it is difficult for me to say if it is infected through an Evisit.  She would be best served from having someone take a look at it in person.  I understand her dentist has an appointment for her next week but they are the primary people to treat infected teeth so she could also give them a call and see if they feel antibiotics are indicated prior to her visit.    Otherwise I think an in person visit would be best to address this concern.    Dr. Rochelle Carlisle, DO

## 2023-02-09 NOTE — TELEPHONE ENCOUNTER
The patient did send an evisit.  She is waiting for a response and wants RN to send a message as well. The patient's wisdom tooth broke last week .  The patient started to have pain yesterday. No fevers.  The patient does not have any swelling or redness.  The patient can taste puss drainage in her mouth. The patient has appt with dentist next week. If tooth is infected they can not pull the tooth.     Please review and advise.      Pharmacy [facility].     Thank you    Jenni MOLINA RN

## 2023-02-10 NOTE — TELEPHONE ENCOUNTER
Call placed to patient   No answer; generic voicemail left requesting call back to Clinic RN at [phone]    Eduardo Chou RN

## 2023-02-15 NOTE — TELEPHONE ENCOUNTER
Call placed to Patient  No answer  Left message and call back number for Patient to return call  Rogelio Oro RN

## 2023-02-20 NOTE — TELEPHONE ENCOUNTER
Chart reviewed   Dr. Carlisle relayed message in E-Visit and patient read message   Closing encounter    Eduardo Chou RN

## 2023-03-27 ENCOUNTER — HOSPITAL ENCOUNTER (EMERGENCY)
Facility: CLINIC | Age: 33
Discharge: LEFT BEFORE DISCHARGE | End: 2023-03-27
Payer: COMMERCIAL

## 2023-03-27 ENCOUNTER — NURSE TRIAGE (OUTPATIENT)
Dept: NURSING | Facility: CLINIC | Age: 33
End: 2023-03-27
Payer: COMMERCIAL

## 2023-03-27 ENCOUNTER — TELEPHONE (OUTPATIENT)
Dept: FAMILY MEDICINE | Facility: CLINIC | Age: 33
End: 2023-03-27
Payer: COMMERCIAL

## 2023-03-27 VITALS
DIASTOLIC BLOOD PRESSURE: 79 MMHG | BODY MASS INDEX: 23.9 KG/M2 | WEIGHT: 140 LBS | TEMPERATURE: 99.3 F | HEIGHT: 64 IN | HEART RATE: 109 BPM | OXYGEN SATURATION: 97 % | SYSTOLIC BLOOD PRESSURE: 115 MMHG

## 2023-03-27 NOTE — TELEPHONE ENCOUNTER
Reason for Call:  Appointment Request    Patient requesting this type of appt:  Illness     Requested provider: anyone    Reason patient unable to be scheduled: nothing available    When does patient want to be seen/preferred time: Same day    Comments: patient is ill, cold,fever, wheezing, sob    Could we send this information to you in [facility] or would you prefer to receive a phone call?:   Patient would prefer a phone call   Okay to leave a detailed message?: Yes at Home number on file [phone] (home)    Call taken on 3/27/2023 at 3:33 PM by Ggoo Alfonso

## 2023-03-27 NOTE — TELEPHONE ENCOUNTER
Pt calling with concerns about;    Fever 102 x 5 day  Cough x5 days  Wheezing increased today  Shortness of breath  phlegm is yellow  Reduced appetite  Coughing so hard she vomited  Some diarrhea today but so far mild    According to the protocol, patient should see a Physician or HCP within 4 hours.  Care advice given. Patient insists she just needs an x ray and states she cannot go sit at [facility] or ED.    Requesting message be routed to PCP to advise Pt at;    [phone]     Miriam Lea RN, Nurse Advisor 3:39 PM 3/27/2023  Reason for Disposition    Wheezing is present    Additional Information    Negative: SEVERE difficulty breathing (e.g., struggling for each breath, speaks in single words)    Negative: Bluish (or gray) lips or face now    Negative: [1] Difficulty breathing AND [2] exposure to flames, smoke, or fumes    Negative: [1] Stridor AND [2] difficulty breathing    Negative: Sounds like a life-threatening emergency to the triager    Negative: [1] Previous asthma attacks AND [2] this feels like asthma attack    Negative: Dry cough (non-productive;  no sputum or minimal clear sputum)    Negative: [1] MODERATE difficulty breathing (e.g., speaks in phrases, SOB even at rest, pulse 100-120) AND [2] still present when not coughing    Negative: Chest pain  (Exception: MILD central chest pain, present only when coughing)    Negative: Patient sounds very sick or weak to the triager    Negative: [1] MILD difficulty breathing (e.g., minimal/no SOB at rest, SOB with walking, pulse <100) AND [2] still present when not coughing    Negative: [1] Coughed up blood AND [2] > 1 tablespoon (15 ml)  (Exception: Blood-tinged sputum.)    Negative: Fever > 103 F (39.4 C)    Negative: [1] Fever > 101 F (38.3 C) AND [2] age > 60 years    Negative: [1] Fever > 100.0 F (37.8 C) AND [2] bedridden (e.g., nursing home patient, CVA, chronic illness, recovering from surgery)    Negative: [1] Fever > 100.0 F (37.8 C) AND [2] diabetes  mellitus or weak immune system (e.g., HIV positive, cancer chemo, splenectomy, organ transplant, chronic steroids)    Protocols used: COUGH - ACUTE [id number]-T-IS

## 2023-03-27 NOTE — TELEPHONE ENCOUNTER
I'm afraid she does need evaluation, not just an x-ray.  If there are no appointment she will need to be seen in UC.    Dr. Rochelle Carlisle, DO

## 2023-03-28 ENCOUNTER — HOSPITAL ENCOUNTER (EMERGENCY)
Facility: CLINIC | Age: 33
Discharge: HOME | End: 2023-03-28
Attending: FAMILY MEDICINE | Admitting: FAMILY MEDICINE
Payer: COMMERCIAL

## 2023-03-28 ENCOUNTER — APPOINTMENT (OUTPATIENT)
Dept: GENERAL RADIOLOGY | Facility: CLINIC | Age: 33
End: 2023-03-28
Attending: FAMILY MEDICINE
Payer: COMMERCIAL

## 2023-03-28 VITALS
SYSTOLIC BLOOD PRESSURE: 122 MMHG | DIASTOLIC BLOOD PRESSURE: 82 MMHG | WEIGHT: 140 LBS | TEMPERATURE: 99 F | RESPIRATION RATE: 20 BRPM | HEIGHT: 64 IN | HEART RATE: 112 BPM | BODY MASS INDEX: 23.9 KG/M2 | OXYGEN SATURATION: 97 %

## 2023-03-28 DIAGNOSIS — J18.9 PNEUMONIA OF LEFT LOWER LOBE DUE TO INFECTIOUS ORGANISM: ICD-10-CM

## 2023-03-28 LAB
ANION GAP SERPL CALCULATED.3IONS-SCNC: 14 MMOL/L (ref 7–15)
BASE EXCESS BLDV CALC-SCNC: 3.3 MMOL/L (ref -7.7–1.9)
BASOPHILS # BLD AUTO: 0.1 10E3/UL (ref 0–0.2)
BASOPHILS NFR BLD AUTO: 0 %
BUN SERPL-MCNC: 6.5 MG/DL (ref 6–20)
CALCIUM SERPL-MCNC: 10 MG/DL (ref 8.6–10)
CHLORIDE SERPL-SCNC: 97 MMOL/L (ref 98–107)
CREAT SERPL-MCNC: 0.59 MG/DL (ref 0.51–0.95)
D DIMER PPP FEU-MCNC: 0.45 UG/ML FEU (ref 0–0.5)
DEPRECATED HCO3 PLAS-SCNC: 22 MMOL/L (ref 22–29)
EOSINOPHIL # BLD AUTO: 0 10E3/UL (ref 0–0.7)
EOSINOPHIL NFR BLD AUTO: 0 %
ERYTHROCYTE [DISTWIDTH] IN BLOOD BY AUTOMATED COUNT: 13.1 % (ref 10–15)
FLUAV RNA SPEC QL NAA+PROBE: NEGATIVE
FLUBV RNA RESP QL NAA+PROBE: NEGATIVE
GFR SERPL CREATININE-BSD FRML MDRD: >90 ML/MIN/1.73M2
GLUCOSE SERPL-MCNC: 127 MG/DL (ref 70–99)
HCG SERPL QL: NEGATIVE
HCO3 BLDV-SCNC: 24 MMOL/L (ref 21–28)
HCT VFR BLD AUTO: 38.6 % (ref 35–47)
HGB BLD-MCNC: 13.1 G/DL (ref 11.7–15.7)
IMM GRANULOCYTES # BLD: 0.2 10E3/UL
IMM GRANULOCYTES NFR BLD: 1 %
LACTATE SERPL-SCNC: 1.2 MMOL/L (ref 0.7–2)
LYMPHOCYTES # BLD AUTO: 1.5 10E3/UL (ref 0.8–5.3)
LYMPHOCYTES NFR BLD AUTO: 6 %
MCH RBC QN AUTO: 29.1 PG (ref 26.5–33)
MCHC RBC AUTO-ENTMCNC: 33.9 G/DL (ref 31.5–36.5)
MCV RBC AUTO: 86 FL (ref 78–100)
MONOCYTES # BLD AUTO: 1.1 10E3/UL (ref 0–1.3)
MONOCYTES NFR BLD AUTO: 4 %
NEUTROPHILS # BLD AUTO: 23.9 10E3/UL (ref 1.6–8.3)
NEUTROPHILS NFR BLD AUTO: 89 %
NRBC # BLD AUTO: 0 10E3/UL
NRBC BLD AUTO-RTO: 0 /100
O2/TOTAL GAS SETTING VFR VENT: 21 %
PCO2 BLDV: 26 MM HG (ref 40–50)
PH BLDV: 7.57 [PH] (ref 7.32–7.43)
PLATELET # BLD AUTO: 417 10E3/UL (ref 150–450)
PO2 BLDV: 40 MM HG (ref 25–47)
POTASSIUM SERPL-SCNC: 3.7 MMOL/L (ref 3.4–5.3)
RBC # BLD AUTO: 4.5 10E6/UL (ref 3.8–5.2)
RSV RNA SPEC NAA+PROBE: NEGATIVE
SARS-COV-2 RNA RESP QL NAA+PROBE: NEGATIVE
SODIUM SERPL-SCNC: 133 MMOL/L (ref 136–145)
WBC # BLD AUTO: 26.9 10E3/UL (ref 4–11)

## 2023-03-28 PROCEDURE — 82803 BLOOD GASES ANY COMBINATION: CPT | Performed by: FAMILY MEDICINE

## 2023-03-28 PROCEDURE — C9803 HOPD COVID-19 SPEC COLLECT: HCPCS | Performed by: FAMILY MEDICINE

## 2023-03-28 PROCEDURE — 250N000009 HC RX 250: Performed by: FAMILY MEDICINE

## 2023-03-28 PROCEDURE — 71046 X-RAY EXAM CHEST 2 VIEWS: CPT

## 2023-03-28 PROCEDURE — 99284 EMERGENCY DEPT VISIT MOD MDM: CPT | Mod: CS | Performed by: FAMILY MEDICINE

## 2023-03-28 PROCEDURE — 99285 EMERGENCY DEPT VISIT HI MDM: CPT | Mod: CS,25 | Performed by: FAMILY MEDICINE

## 2023-03-28 PROCEDURE — 83605 ASSAY OF LACTIC ACID: CPT | Performed by: FAMILY MEDICINE

## 2023-03-28 PROCEDURE — 82310 ASSAY OF CALCIUM: CPT | Performed by: FAMILY MEDICINE

## 2023-03-28 PROCEDURE — 93010 ELECTROCARDIOGRAM REPORT: CPT | Performed by: FAMILY MEDICINE

## 2023-03-28 PROCEDURE — 96361 HYDRATE IV INFUSION ADD-ON: CPT | Performed by: FAMILY MEDICINE

## 2023-03-28 PROCEDURE — 250N000011 HC RX IP 250 OP 636: Performed by: FAMILY MEDICINE

## 2023-03-28 PROCEDURE — 87637 SARSCOV2&INF A&B&RSV AMP PRB: CPT | Performed by: FAMILY MEDICINE

## 2023-03-28 PROCEDURE — 85379 FIBRIN DEGRADATION QUANT: CPT | Performed by: FAMILY MEDICINE

## 2023-03-28 PROCEDURE — 93005 ELECTROCARDIOGRAM TRACING: CPT | Performed by: FAMILY MEDICINE

## 2023-03-28 PROCEDURE — 258N000003 HC RX IP 258 OP 636: Performed by: FAMILY MEDICINE

## 2023-03-28 PROCEDURE — 85025 COMPLETE CBC W/AUTO DIFF WBC: CPT | Performed by: FAMILY MEDICINE

## 2023-03-28 PROCEDURE — 84703 CHORIONIC GONADOTROPIN ASSAY: CPT | Performed by: FAMILY MEDICINE

## 2023-03-28 PROCEDURE — 96365 THER/PROPH/DIAG IV INF INIT: CPT | Performed by: FAMILY MEDICINE

## 2023-03-28 PROCEDURE — 36415 COLL VENOUS BLD VENIPUNCTURE: CPT | Performed by: FAMILY MEDICINE

## 2023-03-28 RX ORDER — AZITHROMYCIN 250 MG/1
TABLET, FILM COATED ORAL
Qty: 6 TABLET | Refills: 0 | Status: SHIPPED | OUTPATIENT
Start: 2023-03-28 | End: 2023-04-02

## 2023-03-28 RX ORDER — AMPICILLIN AND SULBACTAM 2; 1 G/1; G/1
3 INJECTION, POWDER, FOR SOLUTION INTRAMUSCULAR; INTRAVENOUS ONCE
Status: COMPLETED | OUTPATIENT
Start: 2023-03-28 | End: 2023-03-28

## 2023-03-28 RX ORDER — AMOXICILLIN AND CLAVULANATE POTASSIUM 600; 42.9 MG/5ML; MG/5ML
875 POWDER, FOR SUSPENSION ORAL 2 TIMES DAILY
Qty: 102.2 ML | Refills: 0 | Status: SHIPPED | OUTPATIENT
Start: 2023-03-28 | End: 2023-04-04

## 2023-03-28 RX ORDER — SODIUM CHLORIDE 9 MG/ML
INJECTION, SOLUTION INTRAVENOUS CONTINUOUS
Status: DISCONTINUED | OUTPATIENT
Start: 2023-03-28 | End: 2023-03-28 | Stop reason: HOSPADM

## 2023-03-28 RX ORDER — IPRATROPIUM BROMIDE AND ALBUTEROL SULFATE 2.5; .5 MG/3ML; MG/3ML
3 SOLUTION RESPIRATORY (INHALATION) ONCE
Status: COMPLETED | OUTPATIENT
Start: 2023-03-28 | End: 2023-03-28

## 2023-03-28 RX ADMIN — IPRATROPIUM BROMIDE AND ALBUTEROL SULFATE 3 ML: .5; 2.5 SOLUTION RESPIRATORY (INHALATION) at 09:38

## 2023-03-28 RX ADMIN — SODIUM CHLORIDE 1000 ML: 9 INJECTION, SOLUTION INTRAVENOUS at 09:25

## 2023-03-28 RX ADMIN — AMPICILLIN SODIUM AND SULBACTAM SODIUM 3 G: 2; 1 INJECTION, POWDER, FOR SOLUTION INTRAMUSCULAR; INTRAVENOUS at 09:35

## 2023-03-28 RX ADMIN — SODIUM CHLORIDE: 9 INJECTION, SOLUTION INTRAVENOUS at 10:34

## 2023-03-28 ASSESSMENT — ENCOUNTER SYMPTOMS
FEVER: 1
SHORTNESS OF BREATH: 1
APPETITE CHANGE: 1
WEAKNESS: 1
FATIGUE: 1
NAUSEA: 1
COUGH: 1
DIARRHEA: 1
ABDOMINAL PAIN: 1

## 2023-03-28 ASSESSMENT — ACTIVITIES OF DAILY LIVING (ADL)
ADLS_ACUITY_SCORE: 35
ADLS_ACUITY_SCORE: 35

## 2023-03-28 NOTE — ED TRIAGE NOTES
Pt c/o cough, n/v/d, fatigue and fever.     Triage Assessment     Row Name 03/27/23 2101       Triage Assessment (Adult)    Airway WDL WDL       Respiratory WDL    Respiratory WDL WDL       Skin Circulation/Temperature WDL    Skin Circulation/Temperature WDL WDL       Cardiac WDL    Cardiac WDL WDL       Peripheral/Neurovascular WDL    Peripheral Neurovascular WDL WDL       Cognitive/Neuro/Behavioral WDL    Cognitive/Neuro/Behavioral WDL WDL

## 2023-03-28 NOTE — ED TRIAGE NOTES
Pt arrives with c/o cough, fever, SOB, vomiting and diarrhea x6 days.      Triage Assessment     Row Name 03/28/23 0802       Triage Assessment (Adult)    Airway WDL WDL       Respiratory WDL    Respiratory WDL X;all    Rhythm/Pattern, Respiratory shortness of breath;tachypneic    Cough Frequency frequent    Cough Type productive       Skin Circulation/Temperature WDL    Skin Circulation/Temperature WDL WDL       Cardiac WDL    Cardiac WDL WDL       Peripheral/Neurovascular WDL    Peripheral Neurovascular WDL WDL       Cognitive/Neuro/Behavioral WDL    Cognitive/Neuro/Behavioral WDL WDL

## 2023-03-28 NOTE — Clinical Note
Michelle Torres was seen and treated in our emergency department on 3/28/2023.  She may return to work on 03/31/2023.  may return to work when not running a fever     If you have any questions or concerns, please don't hesitate to call.      Parker Tapia MD
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
## 2023-03-28 NOTE — DISCHARGE INSTRUCTIONS
ICD-10-CM    1. Pneumonia of left lower lobe due to infectious organism  J18.9     despite negative cxr - your findings of high white count, productive cough, chest pain, persistenmt fever, tachycardia, abnormal lung exam - all point to pneumonia.  also the spasmodic cough - at times leading to vomiting can be caused by pertuissis.  plan for augmentin + zithromax and await nasal swab., quarantine until result negative.

## 2023-03-29 RX ORDER — AZITHROMYCIN 250 MG/1
TABLET, FILM COATED ORAL
Qty: 6 TABLET | Refills: 0 | OUTPATIENT
Start: 2023-03-29

## 2023-03-29 NOTE — TELEPHONE ENCOUNTER
Chart reviewed. Patient had ED visit.  Closing encounter.   Cathy Mckeon RN on 3/29/2023 at 9:16 AM

## 2023-03-31 NOTE — TELEPHONE ENCOUNTER
Patient just received azithromycin on 3/28/2023.   Tried calling patient to clarify.  LVM to call back to [phone].    Cathy Mckeon RN on 3/31/2023 at 11:35 AM

## 2023-04-04 NOTE — TELEPHONE ENCOUNTER
RN tried to reach patient.   No answer.   LVM to call back to [phone].   Cathy Mckeon RN on 4/4/2023 at 9:47 AM

## 2023-04-05 RX ORDER — AZITHROMYCIN 250 MG/1
TABLET, FILM COATED ORAL
Qty: 6 TABLET | Refills: 0 | OUTPATIENT
Start: 2023-04-05

## 2023-04-05 NOTE — TELEPHONE ENCOUNTER
Denied. Patient needs an appointment to be seen if rx is still needed.    Cathy Mckeon RN on 4/5/2023 at 7:11 AM

## 2023-04-16 ENCOUNTER — HEALTH MAINTENANCE LETTER (OUTPATIENT)
Age: 33
End: 2023-04-16

## 2023-05-04 ENCOUNTER — VIRTUAL VISIT (OUTPATIENT)
Dept: FAMILY MEDICINE | Facility: CLINIC | Age: 33
End: 2023-05-04
Payer: COMMERCIAL

## 2023-05-04 DIAGNOSIS — J02.0 ACUTE STREPTOCOCCAL PHARYNGITIS: Primary | ICD-10-CM

## 2023-05-04 DIAGNOSIS — R19.7 DIARRHEA, UNSPECIFIED TYPE: ICD-10-CM

## 2023-05-04 PROCEDURE — 99213 OFFICE O/P EST LOW 20 MIN: CPT | Mod: VID | Performed by: NURSE PRACTITIONER

## 2023-05-04 RX ORDER — AMOXICILLIN 875 MG
875 TABLET ORAL 2 TIMES DAILY
Qty: 14 TABLET | Refills: 0 | Status: SHIPPED | OUTPATIENT
Start: 2023-05-04 | End: 2023-07-19

## 2023-05-04 ASSESSMENT — ENCOUNTER SYMPTOMS
ENDOCRINE NEGATIVE: 1
CARDIOVASCULAR NEGATIVE: 1
DIARRHEA: 1
CONSTITUTIONAL NEGATIVE: 1
EYES NEGATIVE: 1
PSYCHIATRIC NEGATIVE: 1
RESPIRATORY NEGATIVE: 1
ALLERGIC/IMMUNOLOGIC NEGATIVE: 1
MUSCULOSKELETAL NEGATIVE: 1
NEUROLOGICAL NEGATIVE: 1
HEMATOLOGIC/LYMPHATIC NEGATIVE: 1

## 2023-05-04 NOTE — PROGRESS NOTES
Michelle is a 32 year old who is being evaluated via a billable video visit.      How would you like to obtain your AVS? MyChart  If the video visit is dropped, the invitation should be resent by: Text to cell phone: [phone]  Will anyone else be joining your video visit? No        Assessment & Plan     Acute streptococcal pharyngitis  Counseled that it might be beneficial in the future to finish antibiotics when they are prescribed and to have a diagnostic test to ensure that antibiotics are even needed.  I sent 7 more days worth of amoxicillin to treat for what is presumed to strep infection  - amoxicillin (AMOXIL) 875 MG tablet; Take 1 tablet (875 mg) by mouth 2 times daily    Diarrhea, unspecified type  As above.  She can try a probiotic or eat foods rich in probiotics and if diarrhea still is not resolving she should seek in clinic evaluation if she should probably be tested for C. difficile and possibly other causes of diarrhea                   Lynnette Haas NP  [facility]    Subjective   Michelle is a 32 year old, presenting for the following health issues:  She had a fever with Sore throat and vomiting and presumed this was strep throat and started amoxicillin  2 days ago.  She had amox on hand from last month when she was in ER, only has 4 tabs of 875mg left because she almost the whole course of amoxicillin but not quite.  She would like more antibiotics to finish what she believes is a presumed strep infection    She also has had diarrhea and states this started before she started the antibiotics 2 days ago.  Fact she had the diarrhea almost a month.  No blood in the stool no fever    States her bedbugs in her apartment since she and the kids are itching and wonders what they should do about it.  I told her she should look into getting an  and treat the itching so that nobody gets secondary skin infections          Diarrhea (X1 week, patient did start taking  amoxicillin for possible strep.) and Insect Bites (Bed bugs, bites X 1 week. )        5/4/2023    11:33 AM   Additional Questions   Roomed by Geeta WATSON CMA   Accompanied by None     Diarrhea                 Review of Systems   Constitutional: Negative.    HENT: Negative.    Eyes: Negative.    Respiratory: Negative.    Cardiovascular: Negative.    Gastrointestinal: Positive for diarrhea.   Endocrine: Negative.    Breasts:  negative.    Genitourinary: Negative.    Musculoskeletal: Negative.    Skin: Negative.    Allergic/Immunologic: Negative.    Neurological: Negative.    Hematological: Negative.    Psychiatric/Behavioral: Negative.             Objective           Vitals:  No vitals were obtained today due to virtual visit.    Physical Exam   GENERAL: Healthy, alert and no distress  EYES: Eyes grossly normal to inspection.  No discharge or erythema, or obvious scleral/conjunctival abnormalities.  RESP: No audible wheeze, cough, or visible cyanosis.  No visible retractions or increased work of breathing.    SKIN: Visible skin clear. No significant rash, abnormal pigmentation or lesions.  NEURO: Cranial nerves grossly intact.  Mentation and speech appropriate for age.  PSYCH: Mentation appears normal, affect normal/bright, judgement and insight intact, normal speech and appearance well-groomed.                Video-Visit Details    Type of service:  Video Visit   Video Start Time: 1200  Video End Time:1210    Originating Location (pt. Location): Home  Distant Location (provider location):  On-site  Platform used for Video Visit: Eduardo

## 2023-05-04 NOTE — LETTER
May 4, 2023      Michelle Torres  [address]   [place] [address]          Dear ,    We are writing to inform you of your test results.    {results letter list:[id number]}    No results found from the In Basket message.    If you have any questions or concerns, please call the clinic at the number listed above.       Sincerely,

## 2023-05-07 ENCOUNTER — HOSPITAL ENCOUNTER (EMERGENCY)
Facility: CLINIC | Age: 33
Discharge: HOME | End: 2023-05-07
Attending: PHYSICIAN ASSISTANT | Admitting: PHYSICIAN ASSISTANT
Payer: COMMERCIAL

## 2023-05-07 VITALS
OXYGEN SATURATION: 96 % | SYSTOLIC BLOOD PRESSURE: 126 MMHG | RESPIRATION RATE: 18 BRPM | DIASTOLIC BLOOD PRESSURE: 81 MMHG | TEMPERATURE: 98.4 F | HEART RATE: 129 BPM

## 2023-05-07 DIAGNOSIS — J03.90 TONSILLITIS: ICD-10-CM

## 2023-05-07 LAB
DEPRECATED S PYO AG THROAT QL EIA: NEGATIVE
GROUP A STREP BY PCR: NOT DETECTED
MONOCYTES NFR BLD AUTO: NEGATIVE %

## 2023-05-07 PROCEDURE — G0463 HOSPITAL OUTPT CLINIC VISIT: HCPCS | Performed by: PHYSICIAN ASSISTANT

## 2023-05-07 PROCEDURE — 87651 STREP A DNA AMP PROBE: CPT | Performed by: PHYSICIAN ASSISTANT

## 2023-05-07 PROCEDURE — 86308 HETEROPHILE ANTIBODY SCREEN: CPT | Performed by: PHYSICIAN ASSISTANT

## 2023-05-07 PROCEDURE — 36415 COLL VENOUS BLD VENIPUNCTURE: CPT | Performed by: PHYSICIAN ASSISTANT

## 2023-05-07 PROCEDURE — 99213 OFFICE O/P EST LOW 20 MIN: CPT | Performed by: PHYSICIAN ASSISTANT

## 2023-05-07 RX ORDER — PREDNISONE 20 MG/1
TABLET ORAL
Qty: 10 TABLET | Refills: 0 | Status: SHIPPED | OUTPATIENT
Start: 2023-05-07 | End: 2023-07-19

## 2023-05-07 ASSESSMENT — ACTIVITIES OF DAILY LIVING (ADL): ADLS_ACUITY_SCORE: 35

## 2023-05-07 NOTE — RESULT ENCOUNTER NOTE
Group A Streptococcus PCR is NEGATIVE  No treatment or change in treatment [facility] ED lab result Strep Group A protocol.

## 2023-05-07 NOTE — Clinical Note
Michelle Torres was seen and treated in our emergency department on 5/7/2023.         Sincerely,     [facility] Emergency Dept
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
## 2023-05-13 ENCOUNTER — VIRTUAL VISIT (OUTPATIENT)
Dept: URGENT CARE | Facility: CLINIC | Age: 33
End: 2023-05-13
Payer: COMMERCIAL

## 2023-05-13 ENCOUNTER — NURSE TRIAGE (OUTPATIENT)
Dept: NURSING | Facility: CLINIC | Age: 33
End: 2023-05-13

## 2023-05-13 ENCOUNTER — NURSE TRIAGE (OUTPATIENT)
Dept: NURSING | Facility: CLINIC | Age: 33
End: 2023-05-13
Payer: COMMERCIAL

## 2023-05-13 DIAGNOSIS — J02.9 ACUTE PHARYNGITIS, UNSPECIFIED ETIOLOGY: Primary | ICD-10-CM

## 2023-05-13 PROCEDURE — 99442 PR PHYSICIAN TELEPHONE EVALUATION 11-20 MIN: CPT | Mod: 93

## 2023-05-13 RX ORDER — PREDNISONE 20 MG/1
TABLET ORAL
Qty: 20 TABLET | Refills: 0 | Status: SHIPPED | OUTPATIENT
Start: 2023-05-13 | End: 2023-05-22

## 2023-05-13 RX ORDER — FLUCONAZOLE 150 MG/1
150 TABLET ORAL ONCE
Qty: 2 TABLET | Refills: 0 | Status: SHIPPED | OUTPATIENT
Start: 2023-05-13 | End: 2023-05-13

## 2023-05-13 RX ORDER — AMOXICILLIN 875 MG
875 TABLET ORAL 2 TIMES DAILY
Qty: 14 TABLET | Refills: 0 | Status: SHIPPED | OUTPATIENT
Start: 2023-05-13 | End: 2023-05-20

## 2023-05-13 NOTE — TELEPHONE ENCOUNTER
Nurse Triage SBAR    Is this a 2nd Level Triage? YES, LICENSED PRACTITIONER REVIEW IS REQUIRED    Situation:     Pt was seen today with Alicia Bhatt for a VV.   At this visit Rx was prescribed an there is an issue with dispensing this d/t unclear script/sig  Needs clarification prior to dispensing to Pt.  Medication is:   predniSONE (DELTASONE) 20 MG tablet    Background:     Assessment:       Protocol Recommended Disposition:   Call PCP Now    Recommendation:   Please call pharmacist Claudia at [facility] in order to clarify and dispense @ [phone]    Routed to provider    Does the patient meet one of the following criteria for ADS visit consideration? No
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
## 2023-05-13 NOTE — TELEPHONE ENCOUNTER
Triage Call:    Caller: Pharmacist Claudia  [facility] in [place]     Pt was seen today with Alicia Bhatt for a VV at this visit Rx was prescribed an there is an issue with dispensing this d/t unclear script/sig  Needs clarification prior to dispensing to Pt.  Medication is:   predniSONE (DELTASONE) 20 MG tablet      Protocol Recommended Disposition: Call PCP/ specialist now    Caller verbalized understanding of instructions and questions answered.      Romy Hillman, RN, MN, PHN on 5/13/2023 at 1:03 PM  [facility]  RN utilized sound nursing judgement based on facility triage protocols during this encounter.        Reason for Disposition    [1] Pharmacy calling with prescription question AND [2] triager unable to answer question    Additional Information    Negative: Drug overdose and triager unable to answer question    Negative: Caller requesting a renewal or refill of a medicine patient is currently taking    Negative: Caller requesting information unrelated to medicine    Negative: Caller requesting information about COVID-19 Vaccine    Negative: Caller requesting information about Emergency Contraception    Negative: Caller requesting information about Combined Birth Control Pills    Negative: Caller requesting information about Progestin Birth Control Pills    Negative: Caller requesting information about Post-Op pain or medicines    Negative: Caller requesting a prescription antibiotic (such as Penicillin) for Strep throat and has a positive culture result    Negative: Caller requesting a prescription anti-viral med (such as Tamiflu) and has influenza (flu) symptoms    Negative: Immunization reaction suspected    Negative: Rash while taking a medicine or within 3 days of stopping it    Negative: [1] Asthma and [2] having symptoms of asthma (cough, wheezing, etc.)    Negative: [1] Symptom of illness (e.g., headache, abdominal pain, earache, vomiting) AND [2] more than mild    Negative:  Breastfeeding questions about mother's medicines and diet    Negative: [1] Intentional drug overdose AND [2] suicidal thoughts or ideas    Negative: MORE THAN A DOUBLE DOSE of a prescription or over-the-counter (OTC) drug    Negative: [1] DOUBLE DOSE (an extra dose or lesser amount) of prescription drug AND [2] any symptoms (e.g., dizziness, nausea, pain, sleepiness)    Negative: [1] DOUBLE DOSE (an extra dose or lesser amount) of over-the-counter (OTC) drug AND [2] any symptoms (e.g., dizziness, nausea, pain, sleepiness)    Negative: Took another person's prescription drug    Negative: [1] DOUBLE DOSE (an extra dose or lesser amount) of prescription drug AND [2] NO symptoms (Exception: a double dose of antibiotics)    Negative: Diabetes drug error or overdose (e.g., took wrong type of insulin or took extra dose)    Negative: [1] Prescription not at pharmacy AND [2] was prescribed by PCP recently (Exception: triager has access to EMR and prescription is recorded there. Go to Home Care and confirm for pharmacy.)    Protocols used: MEDICATION QUESTION CALL-A-

## 2023-05-13 NOTE — TELEPHONE ENCOUNTER
"Triage Call:    Caller: Patient  Seen in ED on Sunday and stopped on Thursday with last dose.    Woke up this morning and was vomiting and the \"white stuff\" is coming back.  She is requesting another prescription of steroids to get through to Monday.    Advised that would need to be seen and evaluated for additional steroid medication.  She is going to try to get a Virtual [facility] appt due to having small children with her.        Caller verbalized understanding of instructions and questions answered.      Karissa Kelley RN on 5/13/2023 at 10:38 AM        Reason for Disposition    [1] Prescription refill request for NON-ESSENTIAL medicine (i.e., no harm to patient if med not taken) AND [2] triager unable to refill per department policy    Additional Information    Negative: [1] Prescription refill request for ESSENTIAL medicine (i.e., likelihood of harm to patient if not taken) AND [2] triager unable to refill per department policy    Negative: [1] Prescription not at pharmacy AND [2] was prescribed by PCP recently  (Exception: triager has access to EMR and prescription is recorded there. Go to Home Care and confirm for pharmacy.)    Negative: [1] Pharmacy calling with prescription questions AND [2] triager unable to answer question    Negative: Prescription request for new medicine (not a refill)    Negative: Caller requesting a CONTROLLED substance prescription refill (e.g., narcotics, ADHD medicines)    Protocols used: MEDICATION REFILL AND RENEWAL CALL-A-      "

## 2023-05-14 ENCOUNTER — MYC REFILL (OUTPATIENT)
Dept: FAMILY MEDICINE | Facility: CLINIC | Age: 33
End: 2023-05-14
Payer: COMMERCIAL

## 2023-05-14 DIAGNOSIS — F41.1 GENERALIZED ANXIETY DISORDER: ICD-10-CM

## 2023-05-17 RX ORDER — SERTRALINE HYDROCHLORIDE 100 MG/1
100 TABLET, FILM COATED ORAL DAILY
Qty: 90 TABLET | Refills: 0 | Status: SHIPPED | OUTPATIENT
Start: 2023-05-17 | End: 2023-07-19

## 2023-07-17 ENCOUNTER — VIRTUAL VISIT (OUTPATIENT)
Dept: FAMILY MEDICINE | Facility: CLINIC | Age: 33
End: 2023-07-17
Payer: COMMERCIAL

## 2023-07-17 ENCOUNTER — HOSPITAL ENCOUNTER (EMERGENCY)
Facility: CLINIC | Age: 33
Discharge: HOME | End: 2023-07-17
Attending: PHYSICIAN ASSISTANT | Admitting: PHYSICIAN ASSISTANT
Payer: COMMERCIAL

## 2023-07-17 VITALS
RESPIRATION RATE: 20 BRPM | OXYGEN SATURATION: 99 % | TEMPERATURE: 100.9 F | HEART RATE: 102 BPM | DIASTOLIC BLOOD PRESSURE: 78 MMHG | SYSTOLIC BLOOD PRESSURE: 111 MMHG

## 2023-07-17 DIAGNOSIS — J02.9 SORE THROAT: Primary | ICD-10-CM

## 2023-07-17 DIAGNOSIS — J03.90 ACUTE TONSILLITIS, UNSPECIFIED ETIOLOGY: ICD-10-CM

## 2023-07-17 LAB — GROUP A STREP BY PCR: NOT DETECTED

## 2023-07-17 PROCEDURE — 87651 STREP A DNA AMP PROBE: CPT | Performed by: PHYSICIAN ASSISTANT

## 2023-07-17 PROCEDURE — 99283 EMERGENCY DEPT VISIT LOW MDM: CPT | Performed by: PHYSICIAN ASSISTANT

## 2023-07-17 PROCEDURE — 99213 OFFICE O/P EST LOW 20 MIN: CPT | Mod: VID | Performed by: FAMILY MEDICINE

## 2023-07-17 ASSESSMENT — PATIENT HEALTH QUESTIONNAIRE - PHQ9
10. IF YOU CHECKED OFF ANY PROBLEMS, HOW DIFFICULT HAVE THESE PROBLEMS MADE IT FOR YOU TO DO YOUR WORK, TAKE CARE OF THINGS AT HOME, OR GET ALONG WITH OTHER PEOPLE: VERY DIFFICULT
SUM OF ALL RESPONSES TO PHQ QUESTIONS 1-9: 19
SUM OF ALL RESPONSES TO PHQ QUESTIONS 1-9: 19

## 2023-07-17 ASSESSMENT — ACTIVITIES OF DAILY LIVING (ADL): ADLS_ACUITY_SCORE: 35
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
## 2023-07-17 NOTE — DISCHARGE INSTRUCTIONS
Your strep test was negative today.  The culture is pending and if it is positive you will receive a call to start antibiotics.  In the meantime continue to treat symptoms with Tylenol or ibuprofen.  Keep up with fluid intake as well.  If you do develop worsening symptoms please return to the emergency department.

## 2023-07-17 NOTE — ED TRIAGE NOTES
Pt reports sore throat , body aches, swelling of the lymph nodes onset yesterday. 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

## 2023-07-17 NOTE — PROGRESS NOTES
Michelle is a 33 year old who is being evaluated via a billable video visit.      How would you like to obtain your AVS? MyChart  If the video visit is dropped, the invitation should be resent by: Text to cell phone: [phone]  Will anyone else be joining your video visit? No          Assessment & Plan     Sore throat  -- symptoms started yesterday. Adamant she has strep throat and wishes to start on antibiotics tonight. Discussed will need a strep test prior to starting antibiotics. Offered rapid strep test to be completed tomorrow as this is the earliest clinic can do this as currently 4:20 pm. She reports she will present to urgent care for further evaluation and cares. She is handling oral secretions, tolerating oral intake but discomfort with swallowing.   - Streptococcus A Rapid Screen w/Reflex to PCR - Clinic Collect    The risks, benefits and treatment options of prescribed medications or other treatments have been discussed with the patient. The patient verbalized their understanding and should call or follow up if no improvement or if they develop further problems.      Yariel Mccurdy DO  [facility]    Subjective   Michelle is a 33 year old, presenting for the following health issues:  URI      7/17/2023     3:40 PM   Additional Questions   Roomed by Sanjuana SALINAS                Symptoms: cc Present Absent Comment   Fever/Chills  x     Fatigue  x     Muscle Aches  x     Eye Irritation   x    Sneezing   x    Nasal Ronnie/Drg   x    Sinus Pressure/Pain   x    Loss of smell   x    Dental pain   x    Sore Throat  x     Swollen Glands  x     Ear Pain/Fullness   x    Cough   x    Wheeze   x    Chest Pain   x    Shortness of breath   x    Rash   x    Other  x  Headache and body aches     Symptom duration:  yesterday   Sympom severity:  mod   Treatments tried:  Ibuprofen   Contacts:  Is a      Throat hurts really bad.   Low grade fever   Reports having strep throat a couple  months ago.   Able to swallow food and liquids but painful.     Discussed with patient she will need a strep test for further evaluation of her symptoms. Unfortunately, as it is 4:20 pm no openings for patient to come into clinic to have this completed. She reports needing to start antibiotics tonight. Discussed will need a strep test prior to starting on antibiotics and offered this testing to be completed tomorrow. She states she will present to urgent care.        Review of Systems   As above       Objective    Vitals - Patient Reported  Temperature (Patient Reported): 100.4  F (38  C)  Pain Score: Severe Pain (6)  Pain Loc: Other - see comment (body aches)        Physical Exam   GENERAL: Healthy, alert and no distress. Handling secretions.   EYES: Eyes grossly normal to inspection.  No discharge or erythema, or obvious scleral/conjunctival abnormalities.  RESP: No audible wheeze, cough, or visible cyanosis.  No visible retractions or increased work of breathing.    SKIN: Visible skin clear. No significant rash, abnormal pigmentation or lesions.  NEURO: Cranial nerves grossly intact.  Mentation and speech appropriate for age.  PSYCH: Mentation appears normal, affect normal/bright, judgement and insight intact, normal speech and appearance well-groomed.      Video-Visit Details    Type of service:  Video Visit   Video Start Time:  4:15  Video End Time:4:22 PM    Originating Location (pt. Location): Home    Distant Location (provider location):  On-site  Platform used for Video Visit: Gunnar

## 2023-07-19 ENCOUNTER — OFFICE VISIT (OUTPATIENT)
Dept: FAMILY MEDICINE | Facility: CLINIC | Age: 33
End: 2023-07-19
Payer: COMMERCIAL

## 2023-07-19 VITALS
SYSTOLIC BLOOD PRESSURE: 102 MMHG | BODY MASS INDEX: 26.78 KG/M2 | TEMPERATURE: 98.8 F | RESPIRATION RATE: 16 BRPM | DIASTOLIC BLOOD PRESSURE: 70 MMHG | WEIGHT: 156 LBS | HEART RATE: 107 BPM | OXYGEN SATURATION: 99 %

## 2023-07-19 DIAGNOSIS — J02.9 ACUTE PHARYNGITIS, UNSPECIFIED ETIOLOGY: Primary | ICD-10-CM

## 2023-07-19 PROCEDURE — 99213 OFFICE O/P EST LOW 20 MIN: CPT | Performed by: NURSE PRACTITIONER

## 2023-07-19 RX ORDER — AMOXICILLIN 400 MG/5ML
500 POWDER, FOR SUSPENSION ORAL 2 TIMES DAILY
Qty: 125 ML | Refills: 0 | Status: SHIPPED | OUTPATIENT
Start: 2023-07-19 | End: 2023-07-29

## 2023-07-19 RX ORDER — PREDNISONE 20 MG/1
20 TABLET ORAL 2 TIMES DAILY
Qty: 10 TABLET | Refills: 0 | Status: SHIPPED | OUTPATIENT
Start: 2023-07-19 | End: 2023-07-24

## 2023-07-19 ASSESSMENT — PAIN SCALES - GENERAL: PAINLEVEL: NO PAIN (0)

## 2023-07-19 NOTE — PROGRESS NOTES
Assessment & Plan     Acute pharyngitis, unspecified etiology  -recommended to retest for strep and Covid patient declined  -since patient not improving and clinical findings suggest possible strep infection, recommended to start antibiotic  -follow up with ENT if no improvement   - amoxicillin (AMOXIL) 400 MG/5ML suspension; Take 6.25 mLs (500 mg) by mouth 2 times daily for 10 days  - predniSONE (DELTASONE) 20 MG tablet; Take 1 tablet (20 mg) by mouth 2 times daily for 5 days        RUPINDER Flores [facility] JENS Martinez is a 33 year old, presenting for the following health issues: throat pain, fevers.   Pharyngitis        7/19/2023    12:54 PM   Additional Questions   Roomed by Sonia         7/19/2023    12:54 PM   Patient Reported Additional Medications   Patient reports taking the following new medications none     HPI     ED/UC Followup:    Facility:  [facility]   Date of visit: 7/17/23   Reason for visit: Acute Tonsillitis   Current Status: Sore throat is getting worse, tonsils are swollen. Has been getting a fever from 101 to 102.         Review of Systems   Constitutional, HEENT, cardiovascular, pulmonary, gi and gu systems are negative, except as otherwise noted.      Objective    /70 (BP Location: Left arm, Patient Position: Sitting, Cuff Size: Adult Regular)   Pulse 107   Temp 98.8  F (37.1  C) (Tympanic)   Resp 16   Wt 70.8 kg (156 lb)   SpO2 99%   BMI 26.78 kg/m    Body mass index is 26.78 kg/m .  Physical Exam   GENERAL: healthy, alert and no distress  EYES: Eyes grossly normal to inspection, PERRL and conjunctivae and sclerae normal  HENT: normal cephalic/atraumatic, ear canals and TM's normal, oral mucous membranes moist, tonsillar hypertrophy, and tonsillar erythema  NECK: bilateral anterior cervical adenopathy  RESP: lungs clear to auscultation - no rales, rhonchi or wheezes  NEURO: Normal strength and tone, mentation intact and speech  normal  PSYCH: mentation appears normal, affect normal/bright

## 2023-08-21 ENCOUNTER — MYC MEDICAL ADVICE (OUTPATIENT)
Dept: FAMILY MEDICINE | Facility: CLINIC | Age: 33
End: 2023-08-21
Payer: COMMERCIAL

## 2023-10-20 ENCOUNTER — VIRTUAL VISIT (OUTPATIENT)
Dept: FAMILY MEDICINE | Facility: CLINIC | Age: 33
End: 2023-10-20
Payer: COMMERCIAL

## 2023-10-20 DIAGNOSIS — J02.9 SORE THROAT: Primary | ICD-10-CM

## 2023-10-20 PROCEDURE — 99213 OFFICE O/P EST LOW 20 MIN: CPT | Mod: VID | Performed by: FAMILY MEDICINE

## 2023-10-20 NOTE — PROGRESS NOTES
Michelle is a 33 year old who is being evaluated via a billable video visit.      How would you like to obtain your AVS? MyChart  If the video visit is dropped, the invitation should be resent by: Text to cell phone: [phone]  Will anyone else be joining your video visit? No        A/P:      ICD-10-CM    1. Sore throat  J02.9 Streptococcus A Rapid Screen w/Reflex to PCR - Clinic Collect        Reviewed recommendations for strep and covid test.  Pt reports negative covid test at home so declines.  Notes she will not have a car until tomorrow night.  Advised that I cannot prescribe antibiotic without a positive test, need to confirm infection first.  Pt verbalizes understanding.  She will try to schedule test and will call nurse advisors if testing is done over the weekend and positive.        Subjective   Michelle is a 33 year old, presenting for the following health issues:  Pharyngitis (Friends tested positive for strep on Wednesday- throat pain started on Thursday- denies any fevers but has a headache and bodyaches)    HPI     Acute Illness  Acute illness concerns: Sore throat  Onset/Duration: Thursday  Symptoms:  Fever: No  Chills/Sweats: YES  Headache (location?): YES  Sinus Pressure: No  Conjunctivitis:  No  Ear Pain: no  Rhinorrhea: No  Congestion: No  Sore Throat: YES  Cough: YES  Wheeze: No  Decreased Appetite: YES  Nausea: No  Vomiting: No  Diarrhea: No  Dysuria/Freq.: No  Dysuria or Hematuria: No  Fatigue/Achiness: YES  Sick/Strep Exposure: YES  Therapies tried and outcome: Ibuprofen      Last night sore throat and body aches started.  Just not feeling good.  Has not measured temp.  Has been taking ibuprofen.    Took a covid test last night which was negative.        Review of Systems         Objective           Vitals:  No vitals were obtained today due to virtual visit.    Physical Exam   GENERAL: Healthy, alert and no distress  EYES: Eyes grossly normal to inspection.  No discharge or erythema, or  obvious scleral/conjunctival abnormalities.  RESP: No audible wheeze, cough, or visible cyanosis.  No visible retractions or increased work of breathing.    SKIN: Visible skin clear. No significant rash, abnormal pigmentation or lesions.  NEURO: Cranial nerves grossly intact.  Mentation and speech appropriate for age.  PSYCH: Mentation appears normal, affect normal/bright, judgement and insight intact, normal speech and appearance well-groomed.    Epic reviewed            Video-Visit Details    Type of service:  Video Visit       Originating Location (pt. Location): Home    Distant Location (provider location):  On-site  Platform used for Video Visit: Demetriaetrigg

## 2023-12-12 ENCOUNTER — TELEPHONE (OUTPATIENT)
Dept: FAMILY MEDICINE | Facility: CLINIC | Age: 33
End: 2023-12-12
Payer: COMMERCIAL

## 2023-12-12 NOTE — TELEPHONE ENCOUNTER
Patient Quality Outreach    Patient is due for the following:   Depression  -  PHQ-9 needed  Physical Preventive Adult Physical      Topic Date Due    COVID-19 Vaccine (1) Never done    Flu Vaccine (1) Never done       Next Steps:   Schedule a Adult Preventative    Type of outreach:    Sent [employer] customer insight message.      Questions for provider review:    None           Alicia Campbell CMA

## 2024-02-04 ENCOUNTER — OFFICE VISIT (OUTPATIENT)
Dept: URGENT CARE | Facility: URGENT CARE | Age: 34
End: 2024-02-04
Payer: COMMERCIAL

## 2024-02-04 VITALS
RESPIRATION RATE: 16 BRPM | HEART RATE: 114 BPM | SYSTOLIC BLOOD PRESSURE: 135 MMHG | DIASTOLIC BLOOD PRESSURE: 86 MMHG | BODY MASS INDEX: 25.23 KG/M2 | TEMPERATURE: 98.8 F | OXYGEN SATURATION: 99 % | WEIGHT: 147 LBS

## 2024-02-04 DIAGNOSIS — J03.90 TONSILLITIS: Primary | ICD-10-CM

## 2024-02-04 PROCEDURE — 99213 OFFICE O/P EST LOW 20 MIN: CPT | Performed by: STUDENT IN AN ORGANIZED HEALTH CARE EDUCATION/TRAINING PROGRAM

## 2024-02-04 RX ORDER — AMOXICILLIN 400 MG/5ML
500 POWDER, FOR SUSPENSION ORAL 2 TIMES DAILY
Qty: 125 ML | Refills: 0 | Status: SHIPPED | OUTPATIENT
Start: 2024-02-04 | End: 2024-02-14

## 2024-02-04 RX ORDER — PREDNISONE 20 MG/1
40 TABLET ORAL DAILY
Qty: 10 TABLET | Refills: 0 | Status: SHIPPED | OUTPATIENT
Start: 2024-02-04 | End: 2024-02-09

## 2024-02-04 NOTE — PROGRESS NOTES
Assessment & Plan     Tonsillitis  Patient has well-documented history of frequent strep infections.  She has been told that she needs a tonsillectomy but was worried about the recovery.  So she had not scheduled that yet.  She declined strep swab as her tonsils tend to bleed for hours after the swabs.  She has extremely enlarged tonsils that are red and have white exudates profusely.  I am going to treat her based on meeting [facility] criteria for strep with amoxicillin to treat the infection and prednisone to treat the swelling of her tonsils.  She is going to schedule the tonsillectomy with her ENT.  - predniSONE (DELTASONE) 20 MG tablet  Dispense: 10 tablet; Refill: 0  - amoxicillin (AMOXIL) 400 MG/5ML suspension  Dispense: 125 mL; Refill: 0       No follow-ups on file.    Echo Garza, RUPINDER [facility]    Diego Martinez is a 33 year old female who presents to clinic today for the following health issues:  Chief Complaint   Patient presents with    Throat Pain     X 1 day. Pt states she is very prone strep and her throat bleeds for hours after swabs.     HPI      Review of Systems  Constitutional, HEENT, cardiovascular, pulmonary, gi and gu systems are negative, except as otherwise noted.      Objective    /86   Pulse 114   Temp 98.8  F (37.1  C) (Tympanic)   Resp 16   Wt 66.7 kg (147 lb)   SpO2 99%   BMI 25.23 kg/m    Physical Exam   GENERAL: alert and no distress  EYES: Eyes grossly normal to inspection, PERRL and conjunctivae and sclerae normal  HENT: normal cephalic/atraumatic, oral mucous membranes moist, tonsillar hypertrophy, tonsillar erythema, and tonsillar exudate  NECK: bilateral anterior cervical adenopathy  MS: no gross musculoskeletal defects noted, no edema  SKIN: no suspicious lesions or rashes  NEURO: Normal strength and tone, mentation intact and speech normal

## 2024-05-11 ENCOUNTER — HOSPITAL ENCOUNTER (EMERGENCY)
Facility: CLINIC | Age: 34
Discharge: HOME | End: 2024-05-11
Attending: FAMILY MEDICINE | Admitting: FAMILY MEDICINE
Payer: COMMERCIAL

## 2024-05-11 VITALS
BODY MASS INDEX: 25.75 KG/M2 | TEMPERATURE: 98 F | OXYGEN SATURATION: 98 % | RESPIRATION RATE: 18 BRPM | SYSTOLIC BLOOD PRESSURE: 112 MMHG | DIASTOLIC BLOOD PRESSURE: 71 MMHG | HEART RATE: 91 BPM | WEIGHT: 150 LBS

## 2024-05-11 DIAGNOSIS — J02.9 PHARYNGITIS, UNSPECIFIED ETIOLOGY: ICD-10-CM

## 2024-05-11 PROCEDURE — 99283 EMERGENCY DEPT VISIT LOW MDM: CPT | Performed by: FAMILY MEDICINE

## 2024-05-11 PROCEDURE — 99284 EMERGENCY DEPT VISIT MOD MDM: CPT | Performed by: FAMILY MEDICINE

## 2024-05-11 RX ORDER — PREDNISONE 20 MG/1
20 TABLET ORAL DAILY
Qty: 10 TABLET | Refills: 0 | Status: SHIPPED | OUTPATIENT
Start: 2024-05-11

## 2024-05-11 RX ORDER — AZITHROMYCIN 250 MG/1
250 TABLET, FILM COATED ORAL DAILY
Qty: 6 TABLET | Refills: 0 | Status: SHIPPED | OUTPATIENT
Start: 2024-05-11

## 2024-05-11 ASSESSMENT — COLUMBIA-SUICIDE SEVERITY RATING SCALE - C-SSRS
6. HAVE YOU EVER DONE ANYTHING, STARTED TO DO ANYTHING, OR PREPARED TO DO ANYTHING TO END YOUR LIFE?: NO
1. IN THE PAST MONTH, HAVE YOU WISHED YOU WERE DEAD OR WISHED YOU COULD GO TO SLEEP AND NOT WAKE UP?: NO
2. HAVE YOU ACTUALLY HAD ANY THOUGHTS OF KILLING YOURSELF IN THE PAST MONTH?: NO

## 2024-05-12 NOTE — ED TRIAGE NOTES
Pt presents with concerns for possible strep infection.      Triage Assessment (Adult)       Row Name 05/11/24 [id number]          Triage Assessment    Airway WDL WDL        Respiratory WDL    Respiratory WDL WDL        Skin Circulation/Temperature WDL    Skin Circulation/Temperature WDL WDL        Cardiac WDL    Cardiac WDL WDL        Peripheral/Neurovascular WDL    Peripheral Neurovascular WDL WDL        Cognitive/Neuro/Behavioral WDL    Cognitive/Neuro/Behavioral WDL WDL
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
## 2024-06-23 ENCOUNTER — HEALTH MAINTENANCE LETTER (OUTPATIENT)
Age: 34
End: 2024-06-23

## 2024-11-12 ENCOUNTER — VIRTUAL VISIT (OUTPATIENT)
Dept: FAMILY MEDICINE | Facility: CLINIC | Age: 34
End: 2024-11-12
Payer: COMMERCIAL

## 2024-11-12 DIAGNOSIS — U07.1 INFECTION DUE TO 2019 NOVEL CORONAVIRUS: Primary | ICD-10-CM

## 2024-11-12 DIAGNOSIS — F41.1 GENERALIZED ANXIETY DISORDER: ICD-10-CM

## 2024-11-12 PROCEDURE — 99214 OFFICE O/P EST MOD 30 MIN: CPT | Mod: 95 | Performed by: NURSE PRACTITIONER

## 2024-11-12 RX ORDER — SERTRALINE HYDROCHLORIDE 25 MG/1
TABLET, FILM COATED ORAL
Qty: 165 TABLET | Refills: 0 | Status: SHIPPED | OUTPATIENT
Start: 2024-11-12 | End: 2025-01-11

## 2024-11-12 RX ORDER — HYDROXYZINE PAMOATE 25 MG/1
25 CAPSULE ORAL 3 TIMES DAILY PRN
Qty: 30 CAPSULE | Refills: 1 | Status: SHIPPED | OUTPATIENT
Start: 2024-11-12

## 2024-11-12 ASSESSMENT — ANXIETY QUESTIONNAIRES
5. BEING SO RESTLESS THAT IT IS HARD TO SIT STILL: NEARLY EVERY DAY
1. FEELING NERVOUS, ANXIOUS, OR ON EDGE: NEARLY EVERY DAY
GAD7 TOTAL SCORE: 20
IF YOU CHECKED OFF ANY PROBLEMS ON THIS QUESTIONNAIRE, HOW DIFFICULT HAVE THESE PROBLEMS MADE IT FOR YOU TO DO YOUR WORK, TAKE CARE OF THINGS AT HOME, OR GET ALONG WITH OTHER PEOPLE: VERY DIFFICULT
2. NOT BEING ABLE TO STOP OR CONTROL WORRYING: NEARLY EVERY DAY
6. BECOMING EASILY ANNOYED OR IRRITABLE: MORE THAN HALF THE DAYS
7. FEELING AFRAID AS IF SOMETHING AWFUL MIGHT HAPPEN: NEARLY EVERY DAY
3. WORRYING TOO MUCH ABOUT DIFFERENT THINGS: NEARLY EVERY DAY
GAD7 TOTAL SCORE: 20

## 2024-11-12 ASSESSMENT — PATIENT HEALTH QUESTIONNAIRE - PHQ9
5. POOR APPETITE OR OVEREATING: NEARLY EVERY DAY
SUM OF ALL RESPONSES TO PHQ QUESTIONS 1-9: 12

## 2024-11-12 ASSESSMENT — ENCOUNTER SYMPTOMS: NERVOUS/ANXIOUS: 1

## 2024-11-12 NOTE — LETTER
2024    Michelle Torres   1990        To Whom it May Concern;    Please excuse Michelle Torres from work/school for an acute illness from 2024 -2024.      Sincerely,      Myra Corona DNP, APRN-CNP

## 2024-11-12 NOTE — PATIENT INSTRUCTIONS
Infection due to 2019 novel coronavirus  Patient currently ill with COVID 19, symptoms started last Tuesday.  Some symptoms not improving including cough, fatigue and low-grade fevers.  Has had increased nasal congestion and postnasal drainage now.  Having significant anxiety which is likely contributing to breathing as well.  Patient has been doing Mucinex and DayQuil and Tylenol and ibuprofen.  Discussed normal course of viral illness, and also watching closely for symptoms of pneumonia.  Advised patient to start Flonase nasal spray, 2 sprays each nostril daily.  Reviewed proper administration instructions with patient.  Also recommend starting over-the-counter antihistamine such as Claritin or Zyrtec once daily.  Would encourage to elevate head of bed, use a coolmist vaporizer and nasal saline flush such as [place] pot and Tylenol and/or ibuprofen as needed.  Advised patient if symptoms are significantly worsening should be seen in clinic for further evaluation.  Otherwise if symptoms are not improving after 5 to 7 days should also be evaluated in clinic.  Note written for work.  If patient is not fever free and feels she cannot return to work by Thursday, advised to [facility] provider and will provide an updated note.    Generalized anxiety disorder  Patient having significant anxiety since having COVID.  Went off of medication approximately 6 months ago she felt she was doing well.  Anxiety is through the rough right now, has an impending fear of death being sick.  Would recommend patient start back on Zoloft, however will take some time to be effective.  In the interim, advised patient to use hydroxyzine as needed and would also encourage practicing deep breathing and meditation.  Patient to follow-up with primary care provider in approximately 4 to 6 weeks for recheck.  - hydrOXYzine edmond (VISTARIL) 25 MG capsule; Take 1 capsule (25 mg) by mouth 3 times daily as needed for anxiety.  - sertraline (ZOLOFT)  25 MG tablet; Take 1 tablet (25 mg) by mouth daily for 5 days, THEN 2 tablets (50 mg) daily for 5 days, THEN 3 tablets (75 mg) daily.

## 2025-04-30 ENCOUNTER — TELEPHONE (OUTPATIENT)
Dept: FAMILY MEDICINE | Facility: CLINIC | Age: 35
End: 2025-04-30
Payer: COMMERCIAL

## 2025-04-30 NOTE — TELEPHONE ENCOUNTER
Patient Quality Outreach    Patient is due for the following:   Cervical Cancer Screening - PAP Needed    Action(s) Taken:   Patient was scheduled for PAP    Type of outreach:    Sent [employer] message.    Questions for provider review:    None         Angelita Chawla CMA  Chart routed to None.

## 2025-07-12 ENCOUNTER — HEALTH MAINTENANCE LETTER (OUTPATIENT)
Age: 35
End: 2025-07-12